# Patient Record
Sex: MALE | Race: OTHER | HISPANIC OR LATINO | URBAN - METROPOLITAN AREA
[De-identification: names, ages, dates, MRNs, and addresses within clinical notes are randomized per-mention and may not be internally consistent; named-entity substitution may affect disease eponyms.]

---

## 2017-08-04 ENCOUNTER — EMERGENCY (EMERGENCY)
Facility: HOSPITAL | Age: 57
LOS: 1 days | Discharge: HOME | End: 2017-08-04
Attending: EMERGENCY MEDICINE | Admitting: EMERGENCY MEDICINE
Payer: COMMERCIAL

## 2017-08-04 VITALS
RESPIRATION RATE: 18 BRPM | OXYGEN SATURATION: 94 % | HEART RATE: 96 BPM | DIASTOLIC BLOOD PRESSURE: 92 MMHG | TEMPERATURE: 98 F | SYSTOLIC BLOOD PRESSURE: 147 MMHG

## 2017-08-04 VITALS
RESPIRATION RATE: 18 BRPM | SYSTOLIC BLOOD PRESSURE: 135 MMHG | OXYGEN SATURATION: 94 % | DIASTOLIC BLOOD PRESSURE: 78 MMHG | HEART RATE: 105 BPM | TEMPERATURE: 98 F

## 2017-08-04 LAB
ALBUMIN SERPL ELPH-MCNC: 4 G/DL — SIGNIFICANT CHANGE UP (ref 3.3–5)
ALP SERPL-CCNC: 101 U/L — SIGNIFICANT CHANGE UP (ref 40–120)
ALT FLD-CCNC: 33 U/L — SIGNIFICANT CHANGE UP (ref 4–41)
AST SERPL-CCNC: 24 U/L — SIGNIFICANT CHANGE UP (ref 4–40)
BASOPHILS # BLD AUTO: 0.04 K/UL — SIGNIFICANT CHANGE UP (ref 0–0.2)
BASOPHILS NFR BLD AUTO: 0.7 % — SIGNIFICANT CHANGE UP (ref 0–2)
BILIRUB SERPL-MCNC: 0.4 MG/DL — SIGNIFICANT CHANGE UP (ref 0.2–1.2)
BUN SERPL-MCNC: 17 MG/DL — SIGNIFICANT CHANGE UP (ref 7–23)
CALCIUM SERPL-MCNC: 8.7 MG/DL — SIGNIFICANT CHANGE UP (ref 8.4–10.5)
CHLORIDE SERPL-SCNC: 102 MMOL/L — SIGNIFICANT CHANGE UP (ref 98–107)
CO2 SERPL-SCNC: 27 MMOL/L — SIGNIFICANT CHANGE UP (ref 22–31)
CREAT SERPL-MCNC: 0.95 MG/DL — SIGNIFICANT CHANGE UP (ref 0.5–1.3)
EOSINOPHIL # BLD AUTO: 0.6 K/UL — HIGH (ref 0–0.5)
EOSINOPHIL NFR BLD AUTO: 10.4 % — HIGH (ref 0–6)
GLUCOSE SERPL-MCNC: 125 MG/DL — HIGH (ref 70–99)
HCT VFR BLD CALC: 47 % — SIGNIFICANT CHANGE UP (ref 39–50)
HGB BLD-MCNC: 15.1 G/DL — SIGNIFICANT CHANGE UP (ref 13–17)
IMM GRANULOCYTES # BLD AUTO: 0.02 # — SIGNIFICANT CHANGE UP
IMM GRANULOCYTES NFR BLD AUTO: 0.3 % — SIGNIFICANT CHANGE UP (ref 0–1.5)
LYMPHOCYTES # BLD AUTO: 1.41 K/UL — SIGNIFICANT CHANGE UP (ref 1–3.3)
LYMPHOCYTES # BLD AUTO: 24.4 % — SIGNIFICANT CHANGE UP (ref 13–44)
MCHC RBC-ENTMCNC: 28.2 PG — SIGNIFICANT CHANGE UP (ref 27–34)
MCHC RBC-ENTMCNC: 32.1 % — SIGNIFICANT CHANGE UP (ref 32–36)
MCV RBC AUTO: 87.9 FL — SIGNIFICANT CHANGE UP (ref 80–100)
MONOCYTES # BLD AUTO: 0.48 K/UL — SIGNIFICANT CHANGE UP (ref 0–0.9)
MONOCYTES NFR BLD AUTO: 8.3 % — SIGNIFICANT CHANGE UP (ref 2–14)
NEUTROPHILS # BLD AUTO: 3.23 K/UL — SIGNIFICANT CHANGE UP (ref 1.8–7.4)
NEUTROPHILS NFR BLD AUTO: 55.9 % — SIGNIFICANT CHANGE UP (ref 43–77)
NRBC # FLD: 0 — SIGNIFICANT CHANGE UP
PLATELET # BLD AUTO: 250 K/UL — SIGNIFICANT CHANGE UP (ref 150–400)
PMV BLD: 10.5 FL — SIGNIFICANT CHANGE UP (ref 7–13)
POTASSIUM SERPL-MCNC: 4.8 MMOL/L — SIGNIFICANT CHANGE UP (ref 3.5–5.3)
POTASSIUM SERPL-SCNC: 4.8 MMOL/L — SIGNIFICANT CHANGE UP (ref 3.5–5.3)
PROT SERPL-MCNC: 7.6 G/DL — SIGNIFICANT CHANGE UP (ref 6–8.3)
RBC # BLD: 5.35 M/UL — SIGNIFICANT CHANGE UP (ref 4.2–5.8)
RBC # FLD: 13.9 % — SIGNIFICANT CHANGE UP (ref 10.3–14.5)
SODIUM SERPL-SCNC: 142 MMOL/L — SIGNIFICANT CHANGE UP (ref 135–145)
WBC # BLD: 5.78 K/UL — SIGNIFICANT CHANGE UP (ref 3.8–10.5)
WBC # FLD AUTO: 5.78 K/UL — SIGNIFICANT CHANGE UP (ref 3.8–10.5)

## 2017-08-04 PROCEDURE — 99285 EMERGENCY DEPT VISIT HI MDM: CPT

## 2017-08-04 PROCEDURE — 71020: CPT | Mod: 26

## 2017-08-04 RX ORDER — IPRATROPIUM/ALBUTEROL SULFATE 18-103MCG
AEROSOL WITH ADAPTER (GRAM) INHALATION
Qty: 0 | Refills: 0 | Status: COMPLETED | OUTPATIENT
Start: 2017-08-04 | End: 2017-08-04

## 2017-08-04 RX ORDER — ALBUTEROL 90 UG/1
1 AEROSOL, METERED ORAL ONCE
Qty: 0 | Refills: 0 | Status: COMPLETED | OUTPATIENT
Start: 2017-08-04 | End: 2017-08-04

## 2017-08-04 RX ORDER — SODIUM CHLORIDE 9 MG/ML
1000 INJECTION INTRAMUSCULAR; INTRAVENOUS; SUBCUTANEOUS ONCE
Qty: 0 | Refills: 0 | Status: COMPLETED | OUTPATIENT
Start: 2017-08-04 | End: 2017-08-04

## 2017-08-04 RX ORDER — MAGNESIUM SULFATE 500 MG/ML
2 VIAL (ML) INJECTION ONCE
Qty: 0 | Refills: 0 | Status: COMPLETED | OUTPATIENT
Start: 2017-08-04 | End: 2017-08-04

## 2017-08-04 RX ORDER — IPRATROPIUM/ALBUTEROL SULFATE 18-103MCG
3 AEROSOL WITH ADAPTER (GRAM) INHALATION ONCE
Qty: 0 | Refills: 0 | Status: COMPLETED | OUTPATIENT
Start: 2017-08-04 | End: 2017-08-04

## 2017-08-04 RX ADMIN — SODIUM CHLORIDE 1000 MILLILITER(S): 9 INJECTION INTRAMUSCULAR; INTRAVENOUS; SUBCUTANEOUS at 11:36

## 2017-08-04 RX ADMIN — Medication 3 MILLILITER(S): at 11:00

## 2017-08-04 RX ADMIN — Medication 50 GRAM(S): at 11:00

## 2017-08-04 RX ADMIN — Medication 3 MILLILITER(S): at 11:36

## 2017-08-04 RX ADMIN — ALBUTEROL 1 PUFF(S): 90 AEROSOL, METERED ORAL at 14:11

## 2017-08-04 RX ADMIN — Medication 125 MILLIGRAM(S): at 11:00

## 2017-08-04 NOTE — ED PROVIDER NOTE - OBJECTIVE STATEMENT
58 y/o M PMH asthma, hypothyroidism c/o wheezing and SOB x 2 days. Pt has taken nebulizer tx twice yesterday and once this am without relief. States he frequently gets exacerbations in the summer d/t heat. Denies fever, chills, CP/tightness, cough, abdominal pain, N/V, sore throat/rhinorrhea, h/o intubations/hospitalizations d/t asthma, le edema, recent travel/sx, h/o DVT/PE, smoking hx. 56 y/o M PMH asthma, hypothyroidism c/o wheezing and SOB x 2 days. Pt has taken nebulizer tx twice yesterday and once this am without relief. States he frequently gets exacerbations in the summer d/t heat. Denies fever, chills, CP/tightness, cough, abdominal pain, N/V, sore throat/rhinorrhea, h/o intubations/hospitalizations d/t asthma, le edema, recent travel/sx, h/o DVT/PE, smoking hx.  ATTG NOTE DR. MAYEN No previous hospitalization for asthma, typically triggered by change in weather.

## 2017-08-04 NOTE — ED PROVIDER NOTE - PLAN OF CARE
Follow up with your PMD within 48-72 hours.  Rest, increase fluids. Take Prednisone 40mg daily for 4 days, Albuterol inhaler 2 inhalations every 4-6 hours as needed. Take all of your other medications as previously prescribed. Any worsening wheezing, shortness of breath, chest pain, fever, chills return to the ED

## 2017-08-04 NOTE — ED ADULT TRIAGE NOTE - CHIEF COMPLAINT QUOTE
Pt having difficulty breathing since this morning, placed on 3L NC, breathing even and mildly labored, wheezing on auscultation. Pt has hx of asthma, took albuterol nebulizer at home.

## 2017-08-04 NOTE — ED PROVIDER NOTE - PHYSICAL EXAMINATION
Pt speaking in complete sentences, O2 sat 94% on 4L NC Pt speaking in complete sentences, O2 sat 94% on 4L NC ATTENDING PHYSICAL EXAM DR. MAYEN ***GEN - NAD; well appearing; A+O x3 ***HEAD - NC/AT ***EYES/NOSE - PERRL, EOMI, mucous membranes moist, no discharge ***THROAT: Oral cavity and pharynx normal. No inflammation, swelling, exudate, or lesions.  ***NECK: Neck supple, non-tender without lymphadenopathy, no masses, no thyromegaly.   ***PULMONARY - b/l wheezing, good air entry, no accessory muscle use ***CARDIAC -s1s2, RRR, no M,G,R ***ABDOMEN - +BS, ND, NT, soft, no guarding, no rebound, no masses  ***BACK - no CVA tenderness, Normal  spine ***EXTREMITIES - symmetric pulses, 2+ dp, capillary refill < 2 seconds, no clubbing, no cyanosis, no edema ***SKIN - no rash or bruising  ***NEUROLOGIC - alert

## 2017-08-04 NOTE — ED PROVIDER NOTE - PROGRESS NOTE DETAILS
Pt states wheezing significantly improved, feels back to baseline. CDU offered as pt's O2 sat remains 94% (now on RA) but pt states his O2 sat is always 94% and he would declined CDU stay. Will send prednisone to rx and recommend pulm f/u - referral list given. Pt otherwise stable for dc.

## 2017-08-04 NOTE — ED PROVIDER NOTE - CARE PLAN
Principal Discharge DX:	Asthma  Instructions for follow-up, activity and diet:	Follow up with your PMD within 48-72 hours.  Rest, increase fluids. Take Prednisone 40mg daily for 4 days, Albuterol inhaler 2 inhalations every 4-6 hours as needed. Take all of your other medications as previously prescribed. Any worsening wheezing, shortness of breath, chest pain, fever, chills return to the ED

## 2017-08-04 NOTE — ED ADULT NURSE NOTE - OBJECTIVE STATEMENT
Patient presented to ED with c/o asthma exacerbation, patient evaluated by provider, medication orderd and administered.  Will continue to monitor patient closely.  Flora GORDON

## 2017-08-04 NOTE — ED PROVIDER NOTE - MEDICAL DECISION MAKING DETAILS
56 y/o M with likely asthma exacerbation, decreased O2 sat and mild respiratory distress in ED;  likely CDU for continued tx of exacerbation  -IVF, steroids, duonebs, mag sulfate, cxr, ekg, peak flow, cbc, cmp

## 2017-08-17 ENCOUNTER — OTHER (OUTPATIENT)
Age: 57
End: 2017-08-17

## 2017-09-03 ENCOUNTER — INPATIENT (INPATIENT)
Facility: HOSPITAL | Age: 57
LOS: 2 days | Discharge: HOME | End: 2017-09-06
Attending: INTERNAL MEDICINE | Admitting: INTERNAL MEDICINE
Payer: COMMERCIAL

## 2017-09-03 VITALS
TEMPERATURE: 98 F | HEART RATE: 107 BPM | OXYGEN SATURATION: 93 % | RESPIRATION RATE: 16 BRPM | SYSTOLIC BLOOD PRESSURE: 120 MMHG | DIASTOLIC BLOOD PRESSURE: 70 MMHG

## 2017-09-03 DIAGNOSIS — E03.9 HYPOTHYROIDISM, UNSPECIFIED: ICD-10-CM

## 2017-09-03 DIAGNOSIS — I48.91 UNSPECIFIED ATRIAL FIBRILLATION: ICD-10-CM

## 2017-09-03 DIAGNOSIS — Z98.890 OTHER SPECIFIED POSTPROCEDURAL STATES: Chronic | ICD-10-CM

## 2017-09-03 DIAGNOSIS — J45.901 UNSPECIFIED ASTHMA WITH (ACUTE) EXACERBATION: ICD-10-CM

## 2017-09-03 DIAGNOSIS — Z29.9 ENCOUNTER FOR PROPHYLACTIC MEASURES, UNSPECIFIED: ICD-10-CM

## 2017-09-03 LAB
ALBUMIN SERPL ELPH-MCNC: 3.8 G/DL — SIGNIFICANT CHANGE UP (ref 3.3–5)
ALP SERPL-CCNC: 91 U/L — SIGNIFICANT CHANGE UP (ref 40–120)
ALT FLD-CCNC: 17 U/L — SIGNIFICANT CHANGE UP (ref 4–41)
APTT BLD: 32.6 SEC — SIGNIFICANT CHANGE UP (ref 27.5–37.4)
AST SERPL-CCNC: 38 U/L — SIGNIFICANT CHANGE UP (ref 4–40)
BASE EXCESS BLDV CALC-SCNC: 0.5 MMOL/L — SIGNIFICANT CHANGE UP
BASOPHILS # BLD AUTO: 0.05 K/UL — SIGNIFICANT CHANGE UP (ref 0–0.2)
BASOPHILS NFR BLD AUTO: 0.7 % — SIGNIFICANT CHANGE UP (ref 0–2)
BILIRUB SERPL-MCNC: 0.4 MG/DL — SIGNIFICANT CHANGE UP (ref 0.2–1.2)
BLOOD GAS VENOUS - CREATININE: 0.86 MG/DL — SIGNIFICANT CHANGE UP (ref 0.5–1.3)
BUN SERPL-MCNC: 18 MG/DL — SIGNIFICANT CHANGE UP (ref 7–23)
CALCIUM SERPL-MCNC: 8.6 MG/DL — SIGNIFICANT CHANGE UP (ref 8.4–10.5)
CHLORIDE BLDV-SCNC: 104 MMOL/L — SIGNIFICANT CHANGE UP (ref 96–108)
CHLORIDE SERPL-SCNC: 101 MMOL/L — SIGNIFICANT CHANGE UP (ref 98–107)
CK MB BLD-MCNC: 1.9 — SIGNIFICANT CHANGE UP (ref 0–2.5)
CK MB BLD-MCNC: 3.85 NG/ML — SIGNIFICANT CHANGE UP (ref 1–6.6)
CK SERPL-CCNC: 206 U/L — HIGH (ref 30–200)
CO2 SERPL-SCNC: 21 MMOL/L — LOW (ref 22–31)
CREAT SERPL-MCNC: 1 MG/DL — SIGNIFICANT CHANGE UP (ref 0.5–1.3)
D DIMER BLD IA.RAPID-MCNC: < 150 NG/ML — SIGNIFICANT CHANGE UP
EOSINOPHIL # BLD AUTO: 0.65 K/UL — HIGH (ref 0–0.5)
EOSINOPHIL NFR BLD AUTO: 9.3 % — HIGH (ref 0–6)
GAS PNL BLDV: 135 MMOL/L — LOW (ref 136–146)
GLUCOSE BLDV-MCNC: 131 — HIGH (ref 70–99)
GLUCOSE SERPL-MCNC: 124 MG/DL — HIGH (ref 70–99)
HCO3 BLDV-SCNC: 25 MMOL/L — SIGNIFICANT CHANGE UP (ref 20–27)
HCT VFR BLD CALC: 49.1 % — SIGNIFICANT CHANGE UP (ref 39–50)
HCT VFR BLDV CALC: 49.6 % — SIGNIFICANT CHANGE UP (ref 39–51)
HGB BLD-MCNC: 15.7 G/DL — SIGNIFICANT CHANGE UP (ref 13–17)
HGB BLDV-MCNC: 16.2 G/DL — SIGNIFICANT CHANGE UP (ref 13–17)
IMM GRANULOCYTES # BLD AUTO: 0.04 # — SIGNIFICANT CHANGE UP
IMM GRANULOCYTES NFR BLD AUTO: 0.6 % — SIGNIFICANT CHANGE UP (ref 0–1.5)
INR BLD: 1.02 — SIGNIFICANT CHANGE UP (ref 0.88–1.17)
LACTATE BLDV-MCNC: 1.8 MMOL/L — SIGNIFICANT CHANGE UP (ref 0.5–2)
LYMPHOCYTES # BLD AUTO: 1.77 K/UL — SIGNIFICANT CHANGE UP (ref 1–3.3)
LYMPHOCYTES # BLD AUTO: 25.3 % — SIGNIFICANT CHANGE UP (ref 13–44)
MCHC RBC-ENTMCNC: 28 PG — SIGNIFICANT CHANGE UP (ref 27–34)
MCHC RBC-ENTMCNC: 32 % — SIGNIFICANT CHANGE UP (ref 32–36)
MCV RBC AUTO: 87.7 FL — SIGNIFICANT CHANGE UP (ref 80–100)
MONOCYTES # BLD AUTO: 0.59 K/UL — SIGNIFICANT CHANGE UP (ref 0–0.9)
MONOCYTES NFR BLD AUTO: 8.4 % — SIGNIFICANT CHANGE UP (ref 2–14)
NEUTROPHILS # BLD AUTO: 3.89 K/UL — SIGNIFICANT CHANGE UP (ref 1.8–7.4)
NEUTROPHILS NFR BLD AUTO: 55.7 % — SIGNIFICANT CHANGE UP (ref 43–77)
NRBC # FLD: 0 — SIGNIFICANT CHANGE UP
PCO2 BLDV: 41 MMHG — SIGNIFICANT CHANGE UP (ref 41–51)
PH BLDV: 7.4 PH — SIGNIFICANT CHANGE UP (ref 7.32–7.43)
PLATELET # BLD AUTO: 303 K/UL — SIGNIFICANT CHANGE UP (ref 150–400)
PMV BLD: 10.3 FL — SIGNIFICANT CHANGE UP (ref 7–13)
PO2 BLDV: 85 MMHG — HIGH (ref 35–40)
POTASSIUM BLDV-SCNC: 4.5 MMOL/L — SIGNIFICANT CHANGE UP (ref 3.4–4.5)
POTASSIUM SERPL-MCNC: 5.4 MMOL/L — HIGH (ref 3.5–5.3)
POTASSIUM SERPL-SCNC: 5.4 MMOL/L — HIGH (ref 3.5–5.3)
PROT SERPL-MCNC: 7.3 G/DL — SIGNIFICANT CHANGE UP (ref 6–8.3)
PROTHROM AB SERPL-ACNC: 11.4 SEC — SIGNIFICANT CHANGE UP (ref 9.8–13.1)
RBC # BLD: 5.6 M/UL — SIGNIFICANT CHANGE UP (ref 4.2–5.8)
RBC # FLD: 14.2 % — SIGNIFICANT CHANGE UP (ref 10.3–14.5)
SAO2 % BLDV: 97.1 % — HIGH (ref 60–85)
SODIUM SERPL-SCNC: 135 MMOL/L — SIGNIFICANT CHANGE UP (ref 135–145)
TROPONIN T SERPL-MCNC: < 0.06 NG/ML — SIGNIFICANT CHANGE UP (ref 0–0.06)
WBC # BLD: 6.99 K/UL — SIGNIFICANT CHANGE UP (ref 3.8–10.5)
WBC # FLD AUTO: 6.99 K/UL — SIGNIFICANT CHANGE UP (ref 3.8–10.5)

## 2017-09-03 PROCEDURE — 71020: CPT | Mod: 26

## 2017-09-03 PROCEDURE — 71275 CT ANGIOGRAPHY CHEST: CPT | Mod: 26

## 2017-09-03 RX ORDER — ASPIRIN/CALCIUM CARB/MAGNESIUM 324 MG
162 TABLET ORAL ONCE
Qty: 0 | Refills: 0 | Status: COMPLETED | OUTPATIENT
Start: 2017-09-03 | End: 2017-09-03

## 2017-09-03 RX ORDER — SODIUM CHLORIDE 9 MG/ML
1000 INJECTION INTRAMUSCULAR; INTRAVENOUS; SUBCUTANEOUS ONCE
Qty: 0 | Refills: 0 | Status: COMPLETED | OUTPATIENT
Start: 2017-09-03 | End: 2017-09-03

## 2017-09-03 RX ORDER — IPRATROPIUM/ALBUTEROL SULFATE 18-103MCG
3 AEROSOL WITH ADAPTER (GRAM) INHALATION ONCE
Qty: 0 | Refills: 0 | Status: COMPLETED | OUTPATIENT
Start: 2017-09-03 | End: 2017-09-03

## 2017-09-03 RX ORDER — MAGNESIUM SULFATE 500 MG/ML
2 VIAL (ML) INJECTION ONCE
Qty: 0 | Refills: 0 | Status: COMPLETED | OUTPATIENT
Start: 2017-09-03 | End: 2017-09-03

## 2017-09-03 RX ORDER — SODIUM CHLORIDE 9 MG/ML
3 INJECTION INTRAMUSCULAR; INTRAVENOUS; SUBCUTANEOUS EVERY 8 HOURS
Qty: 0 | Refills: 0 | Status: DISCONTINUED | OUTPATIENT
Start: 2017-09-03 | End: 2017-09-06

## 2017-09-03 RX ORDER — LEVALBUTEROL 1.25 MG/.5ML
0.63 SOLUTION, CONCENTRATE RESPIRATORY (INHALATION) EVERY 6 HOURS
Qty: 0 | Refills: 0 | Status: DISCONTINUED | OUTPATIENT
Start: 2017-09-03 | End: 2017-09-06

## 2017-09-03 RX ORDER — ENOXAPARIN SODIUM 100 MG/ML
150 INJECTION SUBCUTANEOUS ONCE
Qty: 0 | Refills: 0 | Status: COMPLETED | OUTPATIENT
Start: 2017-09-03 | End: 2017-09-03

## 2017-09-03 RX ORDER — MOMETASONE FUROATE 220 UG/1
1 INHALANT RESPIRATORY (INHALATION)
Qty: 0 | Refills: 0 | Status: DISCONTINUED | OUTPATIENT
Start: 2017-09-03 | End: 2017-09-06

## 2017-09-03 RX ORDER — ASPIRIN/CALCIUM CARB/MAGNESIUM 324 MG
81 TABLET ORAL DAILY
Qty: 0 | Refills: 0 | Status: DISCONTINUED | OUTPATIENT
Start: 2017-09-03 | End: 2017-09-04

## 2017-09-03 RX ORDER — ENOXAPARIN SODIUM 100 MG/ML
40 INJECTION SUBCUTANEOUS EVERY 24 HOURS
Qty: 0 | Refills: 0 | Status: DISCONTINUED | OUTPATIENT
Start: 2017-09-04 | End: 2017-09-04

## 2017-09-03 RX ORDER — ALBUTEROL 90 UG/1
2.5 AEROSOL, METERED ORAL ONCE
Qty: 0 | Refills: 0 | Status: COMPLETED | OUTPATIENT
Start: 2017-09-03 | End: 2017-09-03

## 2017-09-03 RX ADMIN — SODIUM CHLORIDE 1000 MILLILITER(S): 9 INJECTION INTRAMUSCULAR; INTRAVENOUS; SUBCUTANEOUS at 18:08

## 2017-09-03 RX ADMIN — Medication 3 MILLILITER(S): at 17:44

## 2017-09-03 RX ADMIN — ALBUTEROL 2.5 MILLIGRAM(S): 90 AEROSOL, METERED ORAL at 18:52

## 2017-09-03 RX ADMIN — Medication 3 MILLILITER(S): at 18:02

## 2017-09-03 RX ADMIN — Medication 50 GRAM(S): at 18:41

## 2017-09-03 RX ADMIN — Medication 162 MILLIGRAM(S): at 22:14

## 2017-09-03 RX ADMIN — Medication 60 MILLIGRAM(S): at 17:43

## 2017-09-03 RX ADMIN — SODIUM CHLORIDE 1000 MILLILITER(S): 9 INJECTION INTRAMUSCULAR; INTRAVENOUS; SUBCUTANEOUS at 17:44

## 2017-09-03 NOTE — H&P ADULT - NEGATIVE NEUROLOGICAL SYMPTOMS
no weakness/no paresthesias/no focal seizures/no generalized seizures/no syncope/no transient paralysis

## 2017-09-03 NOTE — ED ADULT NURSE REASSESSMENT NOTE - NS ED NURSE REASSESS COMMENT FT1
peak flow 400 after 3 duonebs and 1 albuterol, spo2 on room air 96%, pt appears more comfortable.  still reports some mild tightness but otherwise feeling much better. 20g iv placed in right ac, additional lab work sent, pt on telemetry- a-fib b/t 110-30 noted.  Will continue to monitor.

## 2017-09-03 NOTE — ED PROVIDER NOTE - PROGRESS NOTE DETAILS
COLE Vera: Peak flow 200, feeling better w/ duoneb. COLE Vera: EKG done showing afib w/ RVR. Pt reassessed, denies hx of afib, no chest pain. Pt moved to Trauma A. Signed out to Dr. Ragsdale and Dr. Randolph

## 2017-09-03 NOTE — H&P ADULT - NEUROLOGICAL DETAILS
alert and oriented x 3/responds to verbal commands/normal strength/no spontaneous movement/sensation intact

## 2017-09-03 NOTE — H&P ADULT - NSHPLABSRESULTS_GEN_ALL_CORE
CXr preliminary = clear  CTPA=Suboptimal examination of the pulmonary arteries. No central pulmonary  embolism.  CE negative x 1  D Dimer< 150  K+= 5.4 hemolysis   BUN/ creatinine= 18/ 1.0  EKG A FIB

## 2017-09-03 NOTE — ED ADULT TRIAGE NOTE - CHIEF COMPLAINT QUOTE
Pt with hx asthma c/o cough with yellow phlegm and wheezing since Aug 1.  Denies chills, headache, body ache, N/V/D.  Pt seen by PCP 1 week ago prescribed prednisone.   Pt took albuterol  this am with no relief.  Pt is scheduled to see a pulmonologist on 9/7

## 2017-09-03 NOTE — ED ADULT NURSE NOTE - OBJECTIVE STATEMENT
Pt awake and alert pt co sob 02 sat 93% room air, pt given duo nebs and steroids as ordered.  Pt with wheezing awaiting chest xray and dispo.

## 2017-09-03 NOTE — ED PROVIDER NOTE - MEDICAL DECISION MAKING DETAILS
58 y/o m w/ worsening SOB x 1 day, diffuse wheezing on exam w/ low peak flow despite good effort. Intially given duonebs and prednisone with some relief. EKG then done showing afib w/ rvr, pt moved over to critical care for further management. Plan CTA chest to eval for PE, rate control to be done by critical team.

## 2017-09-03 NOTE — H&P ADULT - NEGATIVE ENMT SYMPTOMS
no nose bleeds/no ear pain/no dry mouth/no nasal congestion/no gum bleeding/no vertigo/no nasal discharge/no tinnitus/no sinus symptoms

## 2017-09-03 NOTE — ED PROVIDER NOTE - OBJECTIVE STATEMENT
56 y/o M PMHx asthma (hx hospitalizations, no intubations, on ventolin prn) and hypothyroidism presents c/o shortness of breath worsening today. Was seen at the [facility] ER 1 month ago, discharged on ventolin and Prednisone 40mg, saw his PMD last week who prescribed another course of Prednisone 20mg. Pt has an appt to see a pulmonologist this week. 56 y/o M PMHx asthma (hx hospitalizations, no intubations, on ventolin prn) and hypothyroidism presents c/o shortness of breath worsening today. Was seen at the [facility] ER 1 month ago, discharged on ventolin and Prednisone 40mg, saw his PMD last week who prescribed another course of Prednisone 20mg. Pt has an appt to see a pulmonologist this week. Came in today because he became short of breath while at rest, which is new from his usual asthma. Admits to a cough productive of white sputum. Denies fevers, chills, chest pain, lower ext edema, recent surgery, recent travel or any other complaints. Used his ventolin inhaler more then 4x today.

## 2017-09-03 NOTE — ED PROVIDER NOTE - ATTENDING CONTRIBUTION TO CARE
57M p/w sob and wheezing worsening today, worsening - pt having SOB at rest which is new/worse.  Reports also cough prod of whitish sputum.  No fever.  No leg swelling or pain.  No h/o DVT/PE.  Recent treatment with steroid course x 2 over the past month.  VS:  tachycardia, mild hypoxia, tachypnea    GEN - mild resp distress; A+O x3 Obese  HEAD - NC/AT     ENT - PEERL, EOMI, mucous membranes  moist , no discharge      NECK: Neck supple, non-tender without lymphadenopathy, no masses, no JVD  PULM - bilateral wheezing,  symmetric breath sounds  COR -  irreg irreg heart sounds    ABD - , ND, NT, soft, no guarding, no rebound, no masses    BACK - no CVA tenderness, nontender spine     EXTREMS - no edema, no deformity, warm and well perfused    SKIN - no rash or bruising      NEUROLOGIC - alert, CN 2-12 intact, sensation nl, motor 5/5 RUE/LUE/RLE/LLE.      IMP:  57M p/w SOB and wheezing today, 3rd exacerbation in 1 month, a/w new onset afib with RVR.  Potentially caused by albuterol however possibility of PE exists given mild hypoxia and tachypnea - mod risk per RGS.  Will CTA and continue treatments.  Likely will need to stay in the hospital for cards eval as well as further asthma mgmt.

## 2017-09-03 NOTE — H&P ADULT - HISTORY OF PRESENT ILLNESS
57M with a history of asthma with multiple hospitalizations, no intubations, on ventolin prn and hypothyroidism, experiencing worsening shortness of breath while at rest, which is new from his usual asthma. Admits to a cough productive of white sputum. Denies fevers, chills, chest pain, lower ext edema, recent surgery, recent travel or any other complaints. Used his ventolin inhaler more then 4x today.    In the Ed, the pt received DUO NEBS---> new onset JOSH  Currently his SOB has improved.

## 2017-09-04 LAB
BUN SERPL-MCNC: 17 MG/DL — SIGNIFICANT CHANGE UP (ref 7–23)
CALCIUM SERPL-MCNC: 9 MG/DL — SIGNIFICANT CHANGE UP (ref 8.4–10.5)
CHLORIDE SERPL-SCNC: 101 MMOL/L — SIGNIFICANT CHANGE UP (ref 98–107)
CHOLEST SERPL-MCNC: 230 MG/DL — HIGH (ref 120–199)
CK SERPL-CCNC: 202 U/L — HIGH (ref 30–200)
CO2 SERPL-SCNC: 16 MMOL/L — LOW (ref 22–31)
CREAT SERPL-MCNC: 0.91 MG/DL — SIGNIFICANT CHANGE UP (ref 0.5–1.3)
GLUCOSE SERPL-MCNC: 140 MG/DL — HIGH (ref 70–99)
HBA1C BLD-MCNC: 6.1 % — HIGH (ref 4–5.6)
HCT VFR BLD CALC: 49.5 % — SIGNIFICANT CHANGE UP (ref 39–50)
HDLC SERPL-MCNC: 43 MG/DL — SIGNIFICANT CHANGE UP (ref 35–55)
HGB BLD-MCNC: 15.5 G/DL — SIGNIFICANT CHANGE UP (ref 13–17)
LIPID PNL WITH DIRECT LDL SERPL: 171 MG/DL — SIGNIFICANT CHANGE UP
MAGNESIUM SERPL-MCNC: 2.4 MG/DL — SIGNIFICANT CHANGE UP (ref 1.6–2.6)
MCHC RBC-ENTMCNC: 27.9 PG — SIGNIFICANT CHANGE UP (ref 27–34)
MCHC RBC-ENTMCNC: 31.3 % — LOW (ref 32–36)
MCV RBC AUTO: 89 FL — SIGNIFICANT CHANGE UP (ref 80–100)
NRBC # FLD: 0 — SIGNIFICANT CHANGE UP
PHOSPHATE SERPL-MCNC: 3.7 MG/DL — SIGNIFICANT CHANGE UP (ref 2.5–4.5)
PLATELET # BLD AUTO: 306 K/UL — SIGNIFICANT CHANGE UP (ref 150–400)
PMV BLD: 10.5 FL — SIGNIFICANT CHANGE UP (ref 7–13)
POTASSIUM SERPL-MCNC: 5.4 MMOL/L — HIGH (ref 3.5–5.3)
POTASSIUM SERPL-SCNC: 5.4 MMOL/L — HIGH (ref 3.5–5.3)
RBC # BLD: 5.56 M/UL — SIGNIFICANT CHANGE UP (ref 4.2–5.8)
RBC # FLD: 14.1 % — SIGNIFICANT CHANGE UP (ref 10.3–14.5)
SODIUM SERPL-SCNC: 137 MMOL/L — SIGNIFICANT CHANGE UP (ref 135–145)
TRIGL SERPL-MCNC: 118 MG/DL — SIGNIFICANT CHANGE UP (ref 10–149)
TROPONIN T SERPL-MCNC: < 0.06 NG/ML — SIGNIFICANT CHANGE UP (ref 0–0.06)
WBC # BLD: 8.51 K/UL — SIGNIFICANT CHANGE UP (ref 3.8–10.5)
WBC # FLD AUTO: 8.51 K/UL — SIGNIFICANT CHANGE UP (ref 3.8–10.5)

## 2017-09-04 RX ORDER — THYROID 120 MG
60 TABLET ORAL DAILY
Qty: 0 | Refills: 0 | Status: DISCONTINUED | OUTPATIENT
Start: 2017-09-04 | End: 2017-09-06

## 2017-09-04 RX ORDER — ENOXAPARIN SODIUM 100 MG/ML
150 INJECTION SUBCUTANEOUS EVERY 12 HOURS
Qty: 0 | Refills: 0 | Status: DISCONTINUED | OUTPATIENT
Start: 2017-09-04 | End: 2017-09-05

## 2017-09-04 RX ADMIN — SODIUM CHLORIDE 3 MILLILITER(S): 9 INJECTION INTRAMUSCULAR; INTRAVENOUS; SUBCUTANEOUS at 14:15

## 2017-09-04 RX ADMIN — ENOXAPARIN SODIUM 150 MILLIGRAM(S): 100 INJECTION SUBCUTANEOUS at 00:37

## 2017-09-04 RX ADMIN — LEVALBUTEROL 0.63 MILLIGRAM(S): 1.25 SOLUTION, CONCENTRATE RESPIRATORY (INHALATION) at 18:32

## 2017-09-04 RX ADMIN — SODIUM CHLORIDE 3 MILLILITER(S): 9 INJECTION INTRAMUSCULAR; INTRAVENOUS; SUBCUTANEOUS at 21:43

## 2017-09-04 RX ADMIN — SODIUM CHLORIDE 3 MILLILITER(S): 9 INJECTION INTRAMUSCULAR; INTRAVENOUS; SUBCUTANEOUS at 06:18

## 2017-09-04 RX ADMIN — MOMETASONE FUROATE 1 PUFF(S): 220 INHALANT RESPIRATORY (INHALATION) at 21:44

## 2017-09-04 RX ADMIN — ENOXAPARIN SODIUM 150 MILLIGRAM(S): 100 INJECTION SUBCUTANEOUS at 18:43

## 2017-09-04 NOTE — PROGRESS NOTE ADULT - ASSESSMENT
57M admitted for Asthma exacerbation and new onset JOSH     Problem/Plan - 1:  ·  Problem: Asthma exacerbation.  Plan: Xopenex treatment Q6* PRN  daily peak flows.     Problem/Plan - 2:  ·  Problem: Atrial fibrillation, unspecified type.CHADS2-1  Plan: CM  F/ U CE ,EKG, TTE, TSH  Give Lovenox 150 mg sub cut x1  Rate control with low dose CCB.EP evaluation-Dr Blas     Problem/Plan - 3:  ·  Problem: Hypothyroidism.  Plan: Not on any medications  F/U TSH.     Problem/Plan - 4:  ·  Problem: Need for prophylactic measure.  Plan: Given Lovenox 150 mg sub cut x1  Then in 12 hours Lovenox 40 mg sub cut daily.

## 2017-09-04 NOTE — PROGRESS NOTE ADULT - ASSESSMENT
Patient is a 57y old  Male who presents with a chief complaint of SOB.     Br Asthma exa:    Xopenex PRN  Daily peak flows  Asmanex    A Fib:  Tele  Cardio f/up  TTE  EP eval  Cardizam.  SQ lovenox (therapeutic)

## 2017-09-04 NOTE — PROGRESS NOTE ADULT - SUBJECTIVE AND OBJECTIVE BOX
HPI:HPI:  57M with a history of asthma with multiple hospitalizations, no intubations, on ventolin prn and hypothyroidism, experiencing worsening shortness of breath while at rest, which is new from his usual asthma. Admits to a cough productive of white sputum. Denies fevers, chills, chest pain, lower ext edema, recent surgery, recent travel or any other complaints. Used his ventolin inhaler more then 4x today.    In the Ed, the pt received DUO NEBS---> new onset JOSH  Currently his SOB has improved. No palpitations-remains in Atrial Fibrillation    PAST MEDICAL & SURGICAL HISTORY:  Hypothyroidism  Asthma  H/O knee surgery      MEDICATIONS  (STANDING):  mometasone 220 MICROgram(s) Inhaler 1 Puff(s) Inhalation two times a day  aspirin enteric coated 81 milliGRAM(s) Oral daily  enoxaparin Injectable 40 milliGRAM(s) SubCutaneous every 24 hours  sodium chloride 0.9% lock flush 3 milliLiter(s) IV Push every 8 hours  diltiazem    Tablet 30 milliGRAM(s) Oral four times a day    MEDICATIONS  (PRN):  levalbuterol Inhalation 0.63 milliGRAM(s) Inhalation every 6 hours PRN shortness of breath, wheezing      FAMILY HISTORY:  No pertinent family history in first degree relatives    No family history of premature coronary artery disease or sudden cardiac death      REVIEW OF SYSTEMS:  Constitutional: [ ] fever, [ ]weight loss,  [ ]fatigue  Eyes: [ ] visual changes  Respiratory: [x ]shortness of breath;  [x ] cough, [x ]wheezing, [ ]chills, [ ]hemoptysis  Cardiovascular: [ ] chest pain, [ ]palpitations, [x ]dizziness,  [ ]leg swelling  Gastrointestinal: [ ] abdominal pain, [ ]nausea, [ ]vomiting,  [ ]diarrhea   Genitourinary: [ ] dysuria, [ ] hematuria  Neurologic: [ ] headaches [ ] tremors  Skin: [ ] itching, [ ]burning, [ ] rashes  Endocrine: [ ] heat or cold intolerance  Musculoskeletal: [ ] joint pain or swelling; [ ] muscle, back, or extremity pain  Psychiatric: [ ] depression, [ ]anxiety, [ ]mood swings, or [ ]difficulty sleeping  Hematologic: [ ] easy bruising, [ ] bleeding gums       [ x] All others negative	  [ ] Unable to obtain    Vital Signs Last 24 Hrs  T(C): 36.4 (04 Sep 2017 06:13), Max: 36.9 (03 Sep 2017 16:16)  T(F): 97.5 (04 Sep 2017 06:13), Max: 98.5 (03 Sep 2017 16:16)  HR: 89 (04 Sep 2017 06:13) (89 - 116)  BP: 105/59 (04 Sep 2017 06:13) (100/68 - 138/69)  RR: 17 (04 Sep 2017 06:13) (16 - 22)  SpO2: 97% (04 Sep 2017 06:13) (93% - 97%)      PHYSICAL EXAM:  General: No acute distress  HEENT: EOMI, PERRL  Neck: Supple, No JVD  Lungs: Clear to percussion bilaterally; No rales or wheezing  Heart: Iregular irregular rhythm; 2/6 systolic murmurs,No rubs, or gallops  Abdomen: Nontender, bowel sounds present  Extremities: No clubbing, cyanosis, or edema  Nervous system:  Alert & Oriented X3, no focal deficits  Psychiatric: Normal affect  Skin: No rashes or lesions      LABS:  09-03    135  |  101  |  18  ----------------------------<  124<H>  5.4<H>   |  21<L>  |  1.00    Ca    8.6      03 Sep 2017 17:53    TPro  7.3  /  Alb  3.8  /  TBili  0.4  /  DBili  x   /  AST  38  /  ALT  17  /  AlkPhos  91  09-03    Creatinine Trend: 1.00<--                        15.5   8.51  )-----------( 306      ( 04 Sep 2017 05:30 )             49.5     PT/INR - ( 03 Sep 2017 18:00 )   PT: 11.4 SEC;   INR: 1.02     PTT - ( 03 Sep 2017 18:00 )  PTT:32.6 SEC    Lipid Panel: Cholesterol, Serum 230  Direct   HDL Cholesterol, Serum 43  Triglycerides, Serum 118    Cardiac Enzymes: CARDIAC MARKERS ( 04 Sep 2017 00:49 )  x     / < 0.06 ng/mL / 202 u/L / x     / x      CARDIAC MARKERS ( 03 Sep 2017 17:53 )  x     / < 0.06 ng/mL / 206 u/L / 3.85 ng/mL / x            09-04 [id number] 6.1      RADIOLOGY: CT ANGIO CHEST (W)AW IC IMPRESSION: Suboptimal examination of the pulmonary arteries. No central pulmonary  embolism.No lung consolidation.      ECG [my interpretation]:Normal sinus rhythm Possible Left atrial enlargement    TELEMETRY:Sinus rhythm no arrhythmias    ECHO:PENDING

## 2017-09-04 NOTE — PROGRESS NOTE ADULT - SUBJECTIVE AND OBJECTIVE BOX
Patient is a 57y old  Male who presents with a chief complaint of SOB.       HPI:  57M with a history of asthma with multiple hospitalizations, no intubations, on ventolin prn and hypothyroidism, experiencing worsening shortness of breath while at rest, which is new from his usual asthma. Admits to a cough productive of white sputum. Denies fevers, chills, chest pain, lower ext edema, recent surgery, recent travel or any other complaints. Used his ventolin inhaler more then 4x today.    In the Ed, the pt received DUO NEBS---> new onset JOSH  Currently his SOB has improved. (03 Sep 2017 23:26)      PAST MEDICAL & SURGICAL HISTORY:  Hypothyroidism  Asthma  H/O knee surgery      Review of Systems:   CONSTITUTIONAL: No fever, weight loss, or fatigue  EYES: No eye pain, visual disturbances, or discharge  ENMT:  No difficulty hearing, tinnitus, vertigo; No sinus or throat pain  NECK: No pain or stiffness  BREASTS: No pain, masses, or nipple discharge  RESPIRATORY: No cough, wheezing, chills or hemoptysis; No shortness of breath  CARDIOVASCULAR: No chest pain, palpitations, dizziness, or leg swelling  GASTROINTESTINAL: No abdominal or epigastric pain. No nausea, vomiting, or hematemesis; No diarrhea or constipation. No melena or hematochezia.  GENITOURINARY: No dysuria, frequency, hematuria, or incontinence  NEUROLOGICAL: No headaches, memory loss, loss of strength, numbness, or tremors  SKIN: No itching, burning, rashes, or lesions   LYMPH NODES: No enlarged glands  ENDOCRINE: No heat or cold intolerance; No hair loss  MUSCULOSKELETAL: No joint pain or swelling; No muscle, back, or extremity pain  PSYCHIATRIC: No depression, anxiety, mood swings, or difficulty sleeping  HEME/LYMPH: No easy bruising, or bleeding gums  ALLERY AND IMMUNOLOGIC: No hives or eczema    Allergies    No Known Allergies    Intolerances        Social History:     FAMILY HISTORY:  No pertinent family history in first degree relatives      MEDICATIONS  (STANDING):  mometasone 220 MICROgram(s) Inhaler 1 Puff(s) Inhalation two times a day  sodium chloride 0.9% lock flush 3 milliLiter(s) IV Push every 8 hours  diltiazem    Tablet 30 milliGRAM(s) Oral four times a day  enoxaparin Injectable 150 milliGRAM(s) SubCutaneous every 12 hours  thyroid 60 milliGRAM(s) Oral daily    MEDICATIONS  (PRN):  levalbuterol Inhalation 0.63 milliGRAM(s) Inhalation every 6 hours PRN shortness of breath, wheezing      CAPILLARY BLOOD GLUCOSE        I&O's Summary      PHYSICAL EXAM:  GENERAL: NAD, well-developed  HEAD:  Atraumatic, Normocephalic  EYES: EOMI, PERRLA, conjunctiva and sclera clear  NECK: Supple, No JVD  CHEST/LUNG: Clear to auscultation bilaterally; No wheeze  HEART: Regular rate and rhythm; No murmurs, rubs, or gallops  ABDOMEN: Soft, Nontender, Nondistended; Bowel sounds present  EXTREMITIES:  2+ Peripheral Pulses, No clubbing, cyanosis, or edema  PSYCH: AAOx3  NEUROLOGY: non-focal  SKIN: No rashes or lesions    LABS:                        15.5   8.51  )-----------( 306      ( 04 Sep 2017 05:30 )             49.5     09-04    137  |  101  |  17  ----------------------------<  140<H>  5.4<H>   |  16<L>  |  0.91    Ca    9.0      04 Sep 2017 05:30  Phos  3.7     09-04  Mg     2.4     09-04    TPro  7.3  /  Alb  3.8  /  TBili  0.4  /  DBili  x   /  AST  38  /  ALT  17  /  AlkPhos  91  09-03    PT/INR - ( 03 Sep 2017 18:00 )   PT: 11.4 SEC;   INR: 1.02          PTT - ( 03 Sep 2017 18:00 )  PTT:32.6 SEC  CARDIAC MARKERS ( 04 Sep 2017 00:49 )  x     / < 0.06 ng/mL / 202 u/L / x     / x      CARDIAC MARKERS ( 03 Sep 2017 17:53 )  x     / < 0.06 ng/mL / 206 u/L / 3.85 ng/mL / x            CAPILLARY BLOOD GLUCOSE                    RADIOLOGY & ADDITIONAL TESTS:    Imaging Personally Reviewed:    Consultant(s) Notes Reviewed:      Care Discussed with Consultants/Other Providers:    Thanks for consult. Will follow.

## 2017-09-04 NOTE — PROGRESS NOTE ADULT - ATTENDING COMMENTS
Rui De Jesus MD,FACC.  [address] [facility].  [facility].  [phone]

## 2017-09-04 NOTE — CONSULT NOTE ADULT - SUBJECTIVE AND OBJECTIVE BOX
EP ATTENDING      HISTORY OF PRESENT ILLNESS: He is a pleasant 56 y/o male PMH asthma and hypothyroidism admitted with asthma exacerbation. He was given several nebs, which resulted in new onset atrial fibrillation. Echo and TSH pending. He denies palpitations. CHADS-VASc not yet established, but 0 so far.      PAST MEDICAL & SURGICAL HISTORY:  Hypothyroidism  Asthma  paroxysmal atrial fibrillation  H/O knee surgery      MEDICATIONS  (STANDING):  mometasone 220 MICROgram(s) Inhaler 1 Puff(s) Inhalation two times a day  sodium chloride 0.9% lock flush 3 milliLiter(s) IV Push every 8 hours  diltiazem    Tablet 30 milliGRAM(s) Oral four times a day  enoxaparin Injectable 150 milliGRAM(s) SubCutaneous every 12 hours    No Known Allergies      FAMILY HISTORY:  No pertinent family history in first degree relatives    Non-contributary for premature coronary disease or sudden cardiac death    SOCIAL HISTORY:    [x ] Non-smoker  [ ] Smoker  [ ] Alcohol      REVIEW OF SYSTEMS:  [ ]chest pain  [ x ]shortness of breath  [  ]palpitations  [  ]syncope  [ ]near syncope [ ]upper extremity weakness   [ ] lower extremity weakness  [  ]diplopia  [  ]altered mental status   [  ]fevers  [ ]chills [ ]nausea  [ ]vomitting  [  ]dysphagia    [ ]abdominal pain  [ ]melena  [ ]BRBPR    [  ]epistaxis  [  ]rash    [ ]lower extremity edema        [x ] All others negative	  [ ] Unable to obtain    PHYSICAL EXAM:  T(C): 36.4 (09-04-17 @ 06:13), Max: 36.9 (09-03-17 @ 16:16)  HR: 89 (09-04-17 @ 06:13) (89 - 116)  BP: 105/59 (09-04-17 @ 06:13) (100/68 - 138/69)  RR: 17 (09-04-17 @ 06:13) (16 - 22)  SpO2: 97% (09-04-17 @ 06:13) (93% - 97%)  Wt(kg): --    Psychiatry:  Mood & affect appropriate  no JVD  IRR, no murmurs  CTAB  soft nt/nd  no c/c/e    TELEMETRY: 	  rate controlled AF  ECG:  	NSR, normal EKG    Echo: pending   NST: n/a  Cath: n/a  	  	  LABS:	 	                          15.5   8.51  )-----------( 306      ( 04 Sep 2017 05:30 )             49.5     09-04    137  |  101  |  17  ----------------------------<  140<H>  5.4<H>   |  16<L>  |  0.91    Ca    9.0      04 Sep 2017 05:30  Phos  3.7     09-04  Mg     2.4     09-04    TPro  7.3  /  Alb  3.8  /  TBili  0.4  /  DBili  x   /  AST  38  /  ALT  17  /  AlkPhos  91  09-03    proBNP:   Lipid Profile:   HgA1c: Hemoglobin A1C, Whole Blood: 6.1 % (09-04 @ 05:30)    TSH:     ASSESSMENT/PLAN: He is a pleasant 56 y/o male PMH asthma and hypothyroidism admitted with asthma exacerbation. He was given several nebs, which resulted in new onset atrial fibrillation. Echo and TSH pending. He denies palpitations. CHADS-VASc not yet established, but 0 so far.    -check TSH, check echo  -start lovenox until chads-vasc is established  -AF should terminate within 48 hours  -if AF doesn't terminated will recommend KOBE-JEFFREY Blas M.D., [place]  [phone]

## 2017-09-05 LAB
BUN SERPL-MCNC: 18 MG/DL — SIGNIFICANT CHANGE UP (ref 7–23)
CALCIUM SERPL-MCNC: 8.2 MG/DL — LOW (ref 8.4–10.5)
CHLORIDE SERPL-SCNC: 103 MMOL/L — SIGNIFICANT CHANGE UP (ref 98–107)
CO2 SERPL-SCNC: 22 MMOL/L — SIGNIFICANT CHANGE UP (ref 22–31)
CREAT SERPL-MCNC: 0.91 MG/DL — SIGNIFICANT CHANGE UP (ref 0.5–1.3)
GLUCOSE SERPL-MCNC: 96 MG/DL — SIGNIFICANT CHANGE UP (ref 70–99)
POTASSIUM SERPL-MCNC: 4.4 MMOL/L — SIGNIFICANT CHANGE UP (ref 3.5–5.3)
POTASSIUM SERPL-SCNC: 4.4 MMOL/L — SIGNIFICANT CHANGE UP (ref 3.5–5.3)
SODIUM SERPL-SCNC: 140 MMOL/L — SIGNIFICANT CHANGE UP (ref 135–145)
T3 SERPL-MCNC: 116 NG/DL — SIGNIFICANT CHANGE UP (ref 80–200)
T4 FREE SERPL-MCNC: 1.02 NG/DL — SIGNIFICANT CHANGE UP (ref 0.9–1.8)
TSH SERPL-MCNC: 4.81 UIU/ML — HIGH (ref 0.27–4.2)

## 2017-09-05 PROCEDURE — 93306 TTE W/DOPPLER COMPLETE: CPT | Mod: 26

## 2017-09-05 RX ORDER — ASPIRIN/CALCIUM CARB/MAGNESIUM 324 MG
325 TABLET ORAL DAILY
Qty: 0 | Refills: 0 | Status: DISCONTINUED | OUTPATIENT
Start: 2017-09-06 | End: 2017-09-06

## 2017-09-05 RX ADMIN — SODIUM CHLORIDE 3 MILLILITER(S): 9 INJECTION INTRAMUSCULAR; INTRAVENOUS; SUBCUTANEOUS at 05:26

## 2017-09-05 RX ADMIN — MOMETASONE FUROATE 1 PUFF(S): 220 INHALANT RESPIRATORY (INHALATION) at 08:47

## 2017-09-05 RX ADMIN — ENOXAPARIN SODIUM 150 MILLIGRAM(S): 100 INJECTION SUBCUTANEOUS at 05:27

## 2017-09-05 RX ADMIN — MOMETASONE FUROATE 1 PUFF(S): 220 INHALANT RESPIRATORY (INHALATION) at 21:40

## 2017-09-05 RX ADMIN — SODIUM CHLORIDE 3 MILLILITER(S): 9 INJECTION INTRAMUSCULAR; INTRAVENOUS; SUBCUTANEOUS at 21:41

## 2017-09-05 RX ADMIN — Medication 60 MILLIGRAM(S): at 05:27

## 2017-09-05 RX ADMIN — SODIUM CHLORIDE 3 MILLILITER(S): 9 INJECTION INTRAMUSCULAR; INTRAVENOUS; SUBCUTANEOUS at 13:00

## 2017-09-05 NOTE — PROGRESS NOTE ADULT - ASSESSMENT
· Assessment		  57M admitted for Asthma exacerbation and new onset JOSH     Problem/Plan - 1:  ·  Problem: Asthma exacerbation.  Plan: Xopenex treatment Q6* PRN  daily peak flows.     Problem/Plan - 2:  ·  Problem: Atrial fibrillation, unspecified type.CHADS2-1  Plan: CM  F/ U CE ,EKG, TTE, TSH  Give Lovenox 150 mg sub cut x1  Rate control with low dose CCB.EP evaluation-Dr Blas- check TSH, check echo  -start lovenox until chads-vasc is established  -AF should terminate within 48 hours  -if AF doesn't terminated will recommend KOBE-DCCV    Problem/Plan - 3:  ·  Problem: Hypothyroidism.  Plan: Not on any medications  F/U TSH.     Problem/Plan - 4:  ·  Problem: Need for prophylactic measure.  Plan: Given Lovenox 150 mg sub cut x1  Then in 12 hours Lovenox 40 mg sub cut daily.

## 2017-09-05 NOTE — PROGRESS NOTE ADULT - SUBJECTIVE AND OBJECTIVE BOX
EP ATTENDING    TSH unremarkable, Echo pending    tele: remains in rate controlled AF    no palpitations, no syncope, no angina    levalbuterol Inhalation 0.63 milliGRAM(s) Inhalation every 6 hours PRN  mometasone 220 MICROgram(s) Inhaler 1 Puff(s) Inhalation two times a day  sodium chloride 0.9% lock flush 3 milliLiter(s) IV Push every 8 hours  diltiazem    Tablet 30 milliGRAM(s) Oral four times a day  enoxaparin Injectable 150 milliGRAM(s) SubCutaneous every 12 hours  thyroid 60 milliGRAM(s) Oral daily                            15.5   8.51  )-----------( 306      ( 04 Sep 2017 05:30 )             49.5       09-05    140  |  103  |  18  ----------------------------<  96  4.4   |  22  |  0.91    Ca    8.2<L>      05 Sep 2017 06:30  Phos  3.7     09-04  Mg     2.4     09-04    TPro  7.3  /  Alb  3.8  /  TBili  0.4  /  DBili  x   /  AST  38  /  ALT  17  /  AlkPhos  91  09-03      CARDIAC MARKERS ( 04 Sep 2017 00:49 )  x     / < 0.06 ng/mL / 202 u/L / x     / x      CARDIAC MARKERS ( 03 Sep 2017 17:53 )  x     / < 0.06 ng/mL / 206 u/L / 3.85 ng/mL / x            T(C): 36.3 (09-05-17 @ 05:30), Max: 37 (09-04-17 @ 21:11)  HR: 86 (09-05-17 @ 05:30) (79 - 93)  BP: 101/66 (09-05-17 @ 05:30) (101/60 - 111/75)  RR: 18 (09-05-17 @ 05:30) (18 - 20)  SpO2: 98% (09-05-17 @ 05:30) (94% - 99%)  Wt(kg): --    no JVD  IRR, no murmurs  CTAB  soft nt/nd  no c/c/e    echo pending (I prioritized)      ASSESSMENT/PLAN: He is a pleasant 58 y/o male PMH asthma and hypothyroidism admitted with asthma exacerbation. He was given several nebs, which resulted in new onset atrial fibrillation. TSH unremarkable, but echo pending. He denies palpitations. CHADS-VASc not yet established, but 0 so far.    -check echo. If unremarkable then change lovenox to asa 325mg daily (CHADS-VASC would be 0 with a normal echo).  -AF should terminate within 48 hours  -if AF doesn't terminated will recommend KOBE-DCCV tomorrow  -NPO after midnight in case KOBE-DCCV needed      Ai Blas MD, [facility]  [phone]        Ai Blas M.D., [facility]  [phone] EP ATTENDING    TSH unremarkable, Echo pending    tele: remains in rate controlled AF    no palpitations, no syncope, no angina    levalbuterol Inhalation 0.63 milliGRAM(s) Inhalation every 6 hours PRN  mometasone 220 MICROgram(s) Inhaler 1 Puff(s) Inhalation two times a day  sodium chloride 0.9% lock flush 3 milliLiter(s) IV Push every 8 hours  diltiazem    Tablet 30 milliGRAM(s) Oral four times a day  enoxaparin Injectable 150 milliGRAM(s) SubCutaneous every 12 hours  thyroid 60 milliGRAM(s) Oral daily                            15.5   8.51  )-----------( 306      ( 04 Sep 2017 05:30 )             49.5       09-05    140  |  103  |  18  ----------------------------<  96  4.4   |  22  |  0.91    Ca    8.2<L>      05 Sep 2017 06:30  Phos  3.7     09-04  Mg     2.4     09-04    TPro  7.3  /  Alb  3.8  /  TBili  0.4  /  DBili  x   /  AST  38  /  ALT  17  /  AlkPhos  91  09-03      CARDIAC MARKERS ( 04 Sep 2017 00:49 )  x     / < 0.06 ng/mL / 202 u/L / x     / x      CARDIAC MARKERS ( 03 Sep 2017 17:53 )  x     / < 0.06 ng/mL / 206 u/L / 3.85 ng/mL / x            T(C): 36.3 (09-05-17 @ 05:30), Max: 37 (09-04-17 @ 21:11)  HR: 86 (09-05-17 @ 05:30) (79 - 93)  BP: 101/66 (09-05-17 @ 05:30) (101/60 - 111/75)  RR: 18 (09-05-17 @ 05:30) (18 - 20)  SpO2: 98% (09-05-17 @ 05:30) (94% - 99%)  Wt(kg): --    no JVD  IRR, no murmurs  CTAB  soft nt/nd  no c/c/e    echo pending (I prioritized)      ASSESSMENT/PLAN: He is a pleasant 56 y/o male PMH asthma and hypothyroidism admitted with asthma exacerbation. He was given several nebs, which resulted in new onset atrial fibrillation. TSH unremarkable, but echo pending. He denies palpitations. CHADS-VASc not yet established, but 0 so far.    -check echo. If unremarkable then change lovenox to asa 325mg daily (CHADS-VASC would be 0 with a normal echo).  -AF should terminate within 48 hours  -if AF doesn't terminated will recommend KOBE-DCCV tomorrow  -NPO after midnight in case KOBE-DCCV needed      Ai Blas MD, [facility]  [phone]

## 2017-09-05 NOTE — PROGRESS NOTE ADULT - SUBJECTIVE AND OBJECTIVE BOX
HPI:HPI:  57M with a history of asthma with multiple hospitalizations, no intubations, on ventolin prn and hypothyroidism, experiencing worsening shortness of breath while at rest, which is new from his usual asthma. Admits to a cough productive of white sputum. Denies fevers, chills, chest pain, lower ext edema, recent surgery, recent travel or any other complaints. Used his ventolin inhaler more then 4x today.    In the Ed, the pt received DUO NEBS---> new onset JOSH  Currently his SOB has improved.Remains in atrial Fibrillation-EP consult noted     PAST MEDICAL & SURGICAL HISTORY:  Hypothyroidism  Asthma  H/O knee surgery      MEDICATIONS  (STANDING):  mometasone 220 MICROgram(s) Inhaler 1 Puff(s) Inhalation two times a day  sodium chloride 0.9% lock flush 3 milliLiter(s) IV Push every 8 hours  diltiazem    Tablet 30 milliGRAM(s) Oral four times a day  enoxaparin Injectable 150 milliGRAM(s) SubCutaneous every 12 hours  thyroid 60 milliGRAM(s) Oral daily    MEDICATIONS  (PRN):  levalbuterol Inhalation 0.63 milliGRAM(s) Inhalation every 6 hours PRN shortness of breath, wheezing      FAMILY HISTORY:  No pertinent family history in first degree relatives    No family history of premature coronary artery disease or sudden cardiac death      REVIEW OF SYSTEMS:  Constitutional: [ ] fever, [ ]weight loss,  [ ]fatigue  Eyes: [ ] visual changes  Respiratory: [x ]shortness of breath;  [x ] cough, [x ]wheezing, [ ]chills, [ ]hemoptysis  Cardiovascular: [ ] chest pain, [ ]palpitations, [x ]dizziness,  [ ]leg swelling  Gastrointestinal: [ ] abdominal pain, [ ]nausea, [ ]vomiting,  [ ]diarrhea   Genitourinary: [ ] dysuria, [ ] hematuria  Neurologic: [ ] headaches [ ] tremors  Skin: [ ] itching, [ ]burning, [ ] rashes  Endocrine: [ ] heat or cold intolerance  Musculoskeletal: [ ] joint pain or swelling; [ ] muscle, back, or extremity pain  Psychiatric: [ ] depression, [ ]anxiety, [ ]mood swings, or [ ]difficulty sleeping  Hematologic: [ ] easy bruising, [ ] bleeding gums       [ x] All others negative	  [ ] Unable to obtain    Vital Signs Last 24 Hrs  T(C): 36.3 (05 Sep 2017 05:30), Max: 37 (04 Sep 2017 21:11)  T(F): 97.3 (05 Sep 2017 05:30), Max: 98.6 (04 Sep 2017 21:11)  HR: 86 (05 Sep 2017 05:30) (79 - 93)  BP: 101/66 (05 Sep 2017 05:30) (101/60 - 111/75)  BP(mean): 69 (04 Sep 2017 21:11) (69 - 74)  RR: 18 (05 Sep 2017 05:30) (18 - 20)  SpO2: 98% (05 Sep 2017 05:30) (94% - 99%)  I&O's Summary      PHYSICAL EXAM:  General: No acute distress BMI-44  HEENT: EOMI, PERRL  Neck: Supple, No JVD  Lungs: Clear to percussion bilaterally; No rales or wheezing  Heart: Irregular irregular rhythm; 2/5 sytolic murmur,No rubs, or gallops  Abdomen: Nontender, bowel sounds present,obese  Extremities: No clubbing, cyanosis, or edema  Nervous system:  Alert & Oriented X3, no focal deficits  Psychiatric: Normal affect  Skin: No rashes or lesions      LABS:  09-04    137  |  101  |  17  ----------------------------<  140<H>  5.4<H>   |  16<L>  |  0.91    Ca    9.0      04 Sep 2017 05:30  Phos  3.7     09-04  Mg     2.4     09-04    TPro  7.3  /  Alb  3.8  /  TBili  0.4  /  DBili  x   /  AST  38  /  ALT  17  /  AlkPhos  91  09-03    Creatinine Trend: 0.91<--, 1.00<--                        15.5   8.51  )-----------( 306      ( 04 Sep 2017 05:30 )             49.5     PT/INR - ( 03 Sep 2017 18:00 )   PT: 11.4 SEC;   INR: 1.02          PTT - ( 03 Sep 2017 18:00 )  PTT:32.6 SEC    Lipid Panel:   Cardiac Enzymes: CARDIAC MARKERS ( 04 Sep 2017 00:49 )  x     / < 0.06 ng/mL / 202 u/L / x     / x      CARDIAC MARKERS ( 03 Sep 2017 17:53 )  x     / < 0.06 ng/mL / 206 u/L / 3.85 ng/mL / x            09-04 [id number] 6.1      RADIOLOGY:CT ANGIO CHEST (W)AW IC  IMPRESSION:  Suboptimal examination of the pulmonary arteries. No central pulmonary  embolism. Interval resolution of left upper lobe  ground glass nodule previously noted in 10/20/2015.    ECG [my interpretation]:Atrial fibrillation,LAE,No acute ST T abnormalities    TELEMETRY:Atrial Fibrillation-Rate controlled    ECHO:PENDING

## 2017-09-05 NOTE — PROGRESS NOTE ADULT - ATTENDING COMMENTS
Rui De Jesus MD,FACC.  [address] [facility].  [facility].  [phone]

## 2017-09-05 NOTE — PROGRESS NOTE ADULT - ASSESSMENT
Patient is a 57y old  Male who presents with a chief complaint of SOB.     Br Asthma exa:    Xopenex PRN  Daily peak flows  Asmanex    A Fib:  Tele  Cardio f/up  TTE  EP f/up elder.  Tigist.  DC lovenox.

## 2017-09-06 ENCOUNTER — TRANSCRIPTION ENCOUNTER (OUTPATIENT)
Age: 57
End: 2017-09-06

## 2017-09-06 VITALS
RESPIRATION RATE: 18 BRPM | DIASTOLIC BLOOD PRESSURE: 71 MMHG | SYSTOLIC BLOOD PRESSURE: 101 MMHG | HEART RATE: 84 BPM | TEMPERATURE: 97 F | OXYGEN SATURATION: 99 %

## 2017-09-06 LAB
BUN SERPL-MCNC: 21 MG/DL — SIGNIFICANT CHANGE UP (ref 7–23)
CALCIUM SERPL-MCNC: 8.6 MG/DL — SIGNIFICANT CHANGE UP (ref 8.4–10.5)
CHLORIDE SERPL-SCNC: 103 MMOL/L — SIGNIFICANT CHANGE UP (ref 98–107)
CO2 SERPL-SCNC: 24 MMOL/L — SIGNIFICANT CHANGE UP (ref 22–31)
CREAT SERPL-MCNC: 0.93 MG/DL — SIGNIFICANT CHANGE UP (ref 0.5–1.3)
GLUCOSE SERPL-MCNC: 97 MG/DL — SIGNIFICANT CHANGE UP (ref 70–99)
HCT VFR BLD CALC: 47.2 % — SIGNIFICANT CHANGE UP (ref 39–50)
HGB BLD-MCNC: 15.3 G/DL — SIGNIFICANT CHANGE UP (ref 13–17)
MAGNESIUM SERPL-MCNC: 2.1 MG/DL — SIGNIFICANT CHANGE UP (ref 1.6–2.6)
MCHC RBC-ENTMCNC: 28.8 PG — SIGNIFICANT CHANGE UP (ref 27–34)
MCHC RBC-ENTMCNC: 32.4 % — SIGNIFICANT CHANGE UP (ref 32–36)
MCV RBC AUTO: 88.7 FL — SIGNIFICANT CHANGE UP (ref 80–100)
NRBC # FLD: 0 — SIGNIFICANT CHANGE UP
PLATELET # BLD AUTO: 238 K/UL — SIGNIFICANT CHANGE UP (ref 150–400)
PMV BLD: 10.5 FL — SIGNIFICANT CHANGE UP (ref 7–13)
POTASSIUM SERPL-MCNC: 4.6 MMOL/L — SIGNIFICANT CHANGE UP (ref 3.5–5.3)
POTASSIUM SERPL-SCNC: 4.6 MMOL/L — SIGNIFICANT CHANGE UP (ref 3.5–5.3)
RBC # BLD: 5.32 M/UL — SIGNIFICANT CHANGE UP (ref 4.2–5.8)
RBC # FLD: 13.8 % — SIGNIFICANT CHANGE UP (ref 10.3–14.5)
SODIUM SERPL-SCNC: 140 MMOL/L — SIGNIFICANT CHANGE UP (ref 135–145)
WBC # BLD: 5.02 K/UL — SIGNIFICANT CHANGE UP (ref 3.8–10.5)
WBC # FLD AUTO: 5.02 K/UL — SIGNIFICANT CHANGE UP (ref 3.8–10.5)

## 2017-09-06 RX ORDER — LEVALBUTEROL 1.25 MG/.5ML
2 SOLUTION, CONCENTRATE RESPIRATORY (INHALATION)
Qty: 1 | Refills: 0 | OUTPATIENT
Start: 2017-09-06 | End: 2017-10-06

## 2017-09-06 RX ORDER — ASPIRIN/CALCIUM CARB/MAGNESIUM 324 MG
1 TABLET ORAL
Qty: 30 | Refills: 0 | OUTPATIENT
Start: 2017-09-06 | End: 2017-10-06

## 2017-09-06 RX ORDER — DILTIAZEM HCL 120 MG
1 CAPSULE, EXT RELEASE 24 HR ORAL
Qty: 30 | Refills: 0 | OUTPATIENT
Start: 2017-09-06 | End: 2017-10-06

## 2017-09-06 RX ADMIN — SODIUM CHLORIDE 3 MILLILITER(S): 9 INJECTION INTRAMUSCULAR; INTRAVENOUS; SUBCUTANEOUS at 04:59

## 2017-09-06 RX ADMIN — SODIUM CHLORIDE 3 MILLILITER(S): 9 INJECTION INTRAMUSCULAR; INTRAVENOUS; SUBCUTANEOUS at 13:16

## 2017-09-06 RX ADMIN — MOMETASONE FUROATE 1 PUFF(S): 220 INHALANT RESPIRATORY (INHALATION) at 08:05

## 2017-09-06 NOTE — DISCHARGE NOTE ADULT - CARE PROVIDER_API CALL
Ai Blas), Cardiac Electrophysiology; Cardiovascular Disease; Internal Medicine; Nuclear Cardiology  [address] [facility]  [address]  [place] [address]  Phone: [phone]  Fax: [phone]    Rui De Jesus (FORREST), Cardiology  [address] [place] [address]  Phone: [phone]  Fax: [phone]

## 2017-09-06 NOTE — PROGRESS NOTE ADULT - SUBJECTIVE AND OBJECTIVE BOX
EP ATTENDING    TSH unremarkable, Echo unremarkable    tele: remains in rate controlled AF    no palpitations, no syncope, no angina, no symptoms    levalbuterol Inhalation 0.63 milliGRAM(s) Inhalation every 6 hours PRN  mometasone 220 MICROgram(s) Inhaler 1 Puff(s) Inhalation two times a day  sodium chloride 0.9% lock flush 3 milliLiter(s) IV Push every 8 hours  thyroid 60 milliGRAM(s) Oral daily  aspirin 325 milliGRAM(s) Oral daily  diltiazem    Tablet 30 milliGRAM(s) Oral two times a day                            15.3   5.02  )-----------( 238      ( 06 Sep 2017 06:30 )             47.2       09-06    140  |  103  |  21  ----------------------------<  97  4.6   |  24  |  0.93    Ca    8.6      06 Sep 2017 06:30  Mg     2.1     09-06        CARDIAC MARKERS ( 04 Sep 2017 00:49 )  x     / < 0.06 ng/mL / 202 u/L / x     / x      CARDIAC MARKERS ( 03 Sep 2017 17:53 )  x     / < 0.06 ng/mL / 206 u/L / 3.85 ng/mL / x            T(C): 36.3 (09-06-17 @ 13:16), Max: 37 (09-05-17 @ 20:29)  HR: 84 (09-06-17 @ 13:16) (84 - 100)  BP: 101/71 (09-06-17 @ 13:16) (96/59 - 139/78)  RR: 18 (09-06-17 @ 13:16) (18 - 18)  SpO2: 99% (09-06-17 @ 13:16) (94% - 100%)  Wt(kg): --    no JVD  IRR, no murmurs  CTAB  soft nt/nd  no c/c/e      ASSESSMENT/PLAN: He is a pleasant 58 y/o male PMH asthma and hypothyroidism admitted with asthma exacerbation. He was given several nebs, which resulted in new onset asymptomatic atrial fibrillation. TSH and echo unremarkable, CHADS-VASC 0.    -ASA 325mg daily  -change cardizem to CD 120mg daily  -considering he has no symptoms would defer KOBE-DCCV for now  -d/c home  -no further inpatient EP workup needed at this time      Ai Blas MD, [facility]  [phone] EP ATTENDING    TSH unremarkable, Echo unremarkable    tele: remains in rate controlled AF    no palpitations, no syncope, no angina, no symptoms    levalbuterol Inhalation 0.63 milliGRAM(s) Inhalation every 6 hours PRN  mometasone 220 MICROgram(s) Inhaler 1 Puff(s) Inhalation two times a day  sodium chloride 0.9% lock flush 3 milliLiter(s) IV Push every 8 hours  thyroid 60 milliGRAM(s) Oral daily  aspirin 325 milliGRAM(s) Oral daily  diltiazem    Tablet 30 milliGRAM(s) Oral two times a day                            15.3   5.02  )-----------( 238      ( 06 Sep 2017 06:30 )             47.2       09-06    140  |  103  |  21  ----------------------------<  97  4.6   |  24  |  0.93    Ca    8.6      06 Sep 2017 06:30  Mg     2.1     09-06        CARDIAC MARKERS ( 04 Sep 2017 00:49 )  x     / < 0.06 ng/mL / 202 u/L / x     / x      CARDIAC MARKERS ( 03 Sep 2017 17:53 )  x     / < 0.06 ng/mL / 206 u/L / 3.85 ng/mL / x            T(C): 36.3 (09-06-17 @ 13:16), Max: 37 (09-05-17 @ 20:29)  HR: 84 (09-06-17 @ 13:16) (84 - 100)  BP: 101/71 (09-06-17 @ 13:16) (96/59 - 139/78)  RR: 18 (09-06-17 @ 13:16) (18 - 18)  SpO2: 99% (09-06-17 @ 13:16) (94% - 100%)  Wt(kg): --    no JVD  IRR, no murmurs  CTAB  soft nt/nd  no c/c/e      ASSESSMENT/PLAN: He is a pleasant 58 y/o male PMH asthma and hypothyroidism admitted with asthma exacerbation. He was given several nebs, which resulted in new onset asymptomatic atrial fibrillation. TSH and echo unremarkable, CHADS-VASC 0.    -ASA 325mg daily  -change cardizem to CD 120mg daily  -considering he has no symptoms would defer KOBE-DCCV for now  -d/c home today  -no further inpatient EP workup needed at this time  -d/w Dr. De Jesus and tele PA Liliana Blas MD, [facility]  [phone]

## 2017-09-06 NOTE — DISCHARGE NOTE ADULT - PATIENT PORTAL LINK FT
“You can access the [facility] Patient Portal, offered by [facility], by registering with the following website: [email or web address]”

## 2017-09-06 NOTE — DISCHARGE NOTE ADULT - MEDICATION SUMMARY - MEDICATIONS TO STOP TAKING
I will STOP taking the medications listed below when I get home from the hospital:    predniSONE 50 mg oral tablet  -- 1 tab(s) by mouth once a day x 4 days  -- It is very important that you take or use this exactly as directed.  Do not skip doses or discontinue unless directed by your doctor.  Obtain medical advice before taking any non-prescription drugs as some may affect the action of this medication.  Take with food or milk.    ProAir HFA 90 mcg/inh inhalation aerosol  -- 2 puff(s) inhaled 4 times a day    predniSONE 20 mg oral tablet  -- 2 tab(s) by mouth once a day  -- It is very important that you take or use this exactly as directed.  Do not skip doses or discontinue unless directed by your doctor.  Obtain medical advice before taking any non-prescription drugs as some may affect the action of this medication.  Take with food or milk.

## 2017-09-06 NOTE — DISCHARGE NOTE ADULT - PLAN OF CARE
continue your synthroid as ordered, check TFTs in 4-6 weeks continue your inhalers as ordered albuterol was changed to Xopenex to prevent tachycardia, follow up with your PCP/ Pulmonologist in 1-2 weeks rate control and anticoagulation your were started on Cardizem CD 120mg daily for rate control and Aspirin 325mg to prevent clots   follow up with Dr. Blas in 1-2 weeks

## 2017-09-06 NOTE — DISCHARGE NOTE ADULT - MEDICATION SUMMARY - MEDICATIONS TO TAKE
I will START or STAY ON the medications listed below when I get home from the hospital:    aspirin 325 mg oral tablet  -- 1 tab(s) by mouth once a day  -- Indication: For Atrial fibrillation    dilTIAZem 120 mg/24 hours oral capsule, extended release  -- 1 cap(s) by mouth once a day  -- Indication: For Atrial fibrillation    Xopenex HFA 45 mcg/inh inhalation aerosol  -- 2 puff(s) inhaled every 4 hours, As Needed -for shortness of breath and/or wheezing  -- For inhalation only.  Shake well before use.    -- Indication: For Asthma exacerbation    Asmanex Twisthaler 60 Dose 220 mcg/inh inhalation aerosol powder  -- 1 puff(s) inhaled 2 times a day  -- Indication: For Asthma exacerbation    [place] Thyroid 60 mg oral tablet  -- 1 tab(s) by mouth once a day  -- Indication: For Hypothyroidism

## 2017-09-06 NOTE — DISCHARGE NOTE ADULT - CARE PLAN
Principal Discharge DX:	Atrial fibrillation  Goal:	rate control and anticoagulation  Instructions for follow-up, activity and diet:	your were started on Cardizem CD 120mg daily for rate control and Aspirin 325mg to prevent clots   follow up with Dr. Blas in 1-2 weeks  Secondary Diagnosis:	Asthma exacerbation  Instructions for follow-up, activity and diet:	continue your inhalers as ordered albuterol was changed to Xopenex to prevent tachycardia, follow up with your PCP/ Pulmonologist in 1-2 weeks  Secondary Diagnosis:	Hypothyroidism  Instructions for follow-up, activity and diet:	continue your synthroid as ordered, check TFTs in 4-6 weeks

## 2017-09-06 NOTE — DISCHARGE NOTE ADULT - HOSPITAL COURSE
57M with a history of asthma with multiple hospitalizations, no intubations, on ventolin prn and hypothyroidism, experiencing worsening shortness of breath while at rest, which is new from his usual asthma. Admits to a cough productive of white sputum. Denies fevers, chills, chest pain, lower ext edema, recent surgery, recent travel or any other complaints. Used his ventolin inhaler more then 4x today.    In the Ed, the pt received DUO NEBS---> new onset JOSH  Currently his SOB has improved.     CTPA=Suboptimal examination of the pulmonary arteries. No central pulmonary  embolism.  CE negative x 2  D Dimer< 150  K+= 5.4 hemolysis   BUN/ creatinine= 18/ 1.0  EKG A FIB   On Cardizem 30 mg po QID  On Lovenox 150 mg sub cut x1  CXR: clear lungs   EP: -check TSH, check echo  -start lovenox until chads-vasc is established  -AF should terminate within 48 hours  -if AF doesn't terminated will recommend KOBE-DCCV  Ep: -check echo. If unremarkable then change lovenox to asa 325mg daily (CHADS-VASC would be 0 with a normal echo).  -AF should terminate within 48 hours  -if AF doesn't terminated will recommend KOBE-DCCV tomorrow  -NPO after midnight in case KOBE-DCCV needed  9/5 Echo:1. Normal mitral valve. Minimal mitral regurgitation.  2. Endocardium not well visualized; grossly low normal left  ventricular systolic function.  Endocardial visualization  enhanced with intravenous injection of echo contrast  (Definity).  3. The right ventricle is not well visualized; grossly  normal right ventricular systolic function.   9/5 cards:asthma and new onset afib, awwaiting TTE, continue xopenex and dialy peak flow, start lovenox 150mg then 12hr later lovenox 40mg sq daily, if afib does not terminate within 48hr would recommend KOBE-DCCV  9/5 EP:TSH unremarkable, check TTE, if unremarkable change lovenox onf 325mg asa daily . if AF does not terminate KOBE-DCCV tomorrow. NPO MN  9/6 EP: -ASA 325mg daily  -change cardizem to CD 120mg daily  -considering he has no symptoms would defer KOBE-DCCV for now  -d/c home today  -no further inpatient EP workup needed at this time  -d/w Dr. De Jesus and tele [place]

## 2017-09-07 ENCOUNTER — APPOINTMENT (OUTPATIENT)
Dept: PULMONOLOGY | Facility: CLINIC | Age: 57
End: 2017-09-07
Payer: COMMERCIAL

## 2017-09-07 VITALS
HEART RATE: 106 BPM | DIASTOLIC BLOOD PRESSURE: 80 MMHG | WEIGHT: 315 LBS | RESPIRATION RATE: 16 BRPM | BODY MASS INDEX: 38.36 KG/M2 | SYSTOLIC BLOOD PRESSURE: 130 MMHG | HEIGHT: 76 IN | TEMPERATURE: 96.9 F | OXYGEN SATURATION: 94 %

## 2017-09-07 PROCEDURE — 94060 EVALUATION OF WHEEZING: CPT

## 2017-09-07 PROCEDURE — ZZZZZ: CPT

## 2017-09-07 PROCEDURE — 94729 DIFFUSING CAPACITY: CPT

## 2017-09-07 PROCEDURE — 99203 OFFICE O/P NEW LOW 30 MIN: CPT | Mod: 25

## 2017-09-07 PROCEDURE — 94726 PLETHYSMOGRAPHY LUNG VOLUMES: CPT

## 2017-11-15 ENCOUNTER — APPOINTMENT (OUTPATIENT)
Dept: PULMONOLOGY | Facility: CLINIC | Age: 57
End: 2017-11-15

## 2018-01-15 ENCOUNTER — OUTPATIENT (OUTPATIENT)
Dept: OUTPATIENT SERVICES | Facility: HOSPITAL | Age: 58
LOS: 1 days | End: 2018-01-15
Payer: COMMERCIAL

## 2018-01-15 DIAGNOSIS — I20.0 UNSTABLE ANGINA: ICD-10-CM

## 2018-01-15 DIAGNOSIS — Z98.890 OTHER SPECIFIED POSTPROCEDURAL STATES: Chronic | ICD-10-CM

## 2018-01-15 PROCEDURE — 78452 HT MUSCLE IMAGE SPECT MULT: CPT | Mod: 26

## 2018-01-15 PROCEDURE — 93016 CV STRESS TEST SUPVJ ONLY: CPT

## 2018-01-15 PROCEDURE — 78452 HT MUSCLE IMAGE SPECT MULT: CPT

## 2018-01-15 PROCEDURE — A9502: CPT

## 2018-01-15 PROCEDURE — 93018 CV STRESS TEST I&R ONLY: CPT

## 2018-01-15 PROCEDURE — 93017 CV STRESS TEST TRACING ONLY: CPT

## 2018-04-23 NOTE — PROGRESS NOTE ADULT - SUBJECTIVE AND OBJECTIVE BOX
Dermatitis has resolved, but nevus persisted, so given irritation due to location, pt opted for removal   Details per procedure note, reviewed post-op care, will call once pathology results are available  Patient is a 57y old  Male who presents with a chief complaint of SOB (03 Sep 2017 23:26)      SUBJECTIVE / OVERNIGHT EVENTS:   Feels better.  Denies CP/SOB/Palpitation/HA.    MEDICATIONS  (STANDING):  mometasone 220 MICROgram(s) Inhaler 1 Puff(s) Inhalation two times a day  sodium chloride 0.9% lock flush 3 milliLiter(s) IV Push every 8 hours  diltiazem    Tablet 30 milliGRAM(s) Oral four times a day  thyroid 60 milliGRAM(s) Oral daily    MEDICATIONS  (PRN):  levalbuterol Inhalation 0.63 milliGRAM(s) Inhalation every 6 hours PRN shortness of breath, wheezing        CAPILLARY BLOOD GLUCOSE        I&O's Summary      PHYSICAL EXAM:  GENERAL: NAD, well-developed  HEAD:  Atraumatic, Normocephalic  EYES: conjunctiva and sclera clear  NECK: Supple, No JVD  CHEST/LUNG: Clear to auscultation bilaterally; No wheeze  HEART: Regular rate and rhythm; No murmurs, rubs, or gallops  ABDOMEN: Soft, Nontender, Nondistended; Bowel sounds present  EXTREMITIES:  2+ Peripheral Pulses, No clubbing, cyanosis, or edema  NEUROLOGY: AAO X 3  SKIN: No rashes    LABS:                        15.5   8.51  )-----------( 306      ( 04 Sep 2017 05:30 )             49.5     09-05    140  |  103  |  18  ----------------------------<  96  4.4   |  22  |  0.91    Ca    8.2<L>      05 Sep 2017 06:30  Phos  3.7     09-04  Mg     2.4     09-04        CARDIAC MARKERS ( 04 Sep 2017 00:49 )  x     / < 0.06 ng/mL / 202 u/L / x     / x            CAPILLARY BLOOD GLUCOSE                    RADIOLOGY & ADDITIONAL TESTS:    Imaging Personally Reviewed:    Consultant(s) Notes Reviewed:      Care Discussed with Consultants/Other Providers:

## 2018-09-10 PROBLEM — E03.9 HYPOTHYROIDISM, UNSPECIFIED: Chronic | Status: ACTIVE | Noted: 2017-08-04

## 2018-10-04 ENCOUNTER — APPOINTMENT (OUTPATIENT)
Dept: PULMONOLOGY | Facility: CLINIC | Age: 58
End: 2018-10-04
Payer: COMMERCIAL

## 2018-10-04 VITALS
SYSTOLIC BLOOD PRESSURE: 123 MMHG | RESPIRATION RATE: 16 BRPM | BODY MASS INDEX: 38.36 KG/M2 | HEART RATE: 97 BPM | DIASTOLIC BLOOD PRESSURE: 82 MMHG | WEIGHT: 315 LBS | OXYGEN SATURATION: 91 % | HEIGHT: 76 IN | TEMPERATURE: 97.3 F

## 2018-10-04 DIAGNOSIS — R06.83 SNORING: ICD-10-CM

## 2018-10-04 DIAGNOSIS — E66.2 MORBID (SEVERE) OBESITY WITH ALVEOLAR HYPOVENTILATION: ICD-10-CM

## 2018-10-04 DIAGNOSIS — E66.01 MORBID (SEVERE) OBESITY DUE TO EXCESS CALORIES: ICD-10-CM

## 2018-10-04 DIAGNOSIS — J45.909 UNSPECIFIED ASTHMA, UNCOMPLICATED: ICD-10-CM

## 2018-10-04 DIAGNOSIS — G47.19 OTHER HYPERSOMNIA: ICD-10-CM

## 2018-10-04 PROCEDURE — 99214 OFFICE O/P EST MOD 30 MIN: CPT

## 2018-10-04 RX ORDER — FLUTICASONE PROPIONATE 220 UG/1
AEROSOL, METERED RESPIRATORY (INHALATION)
Refills: 0 | Status: ACTIVE | COMMUNITY

## 2019-01-21 ENCOUNTER — INPATIENT (INPATIENT)
Facility: HOSPITAL | Age: 59
LOS: 6 days | Discharge: HOME | End: 2019-01-28
Attending: INTERNAL MEDICINE | Admitting: INTERNAL MEDICINE
Payer: COMMERCIAL

## 2019-01-21 VITALS
OXYGEN SATURATION: 93 % | HEART RATE: 102 BPM | TEMPERATURE: 98 F | RESPIRATION RATE: 18 BRPM | DIASTOLIC BLOOD PRESSURE: 78 MMHG | SYSTOLIC BLOOD PRESSURE: 132 MMHG

## 2019-01-21 DIAGNOSIS — J45.901 UNSPECIFIED ASTHMA WITH (ACUTE) EXACERBATION: ICD-10-CM

## 2019-01-21 DIAGNOSIS — E03.9 HYPOTHYROIDISM, UNSPECIFIED: ICD-10-CM

## 2019-01-21 DIAGNOSIS — I48.91 UNSPECIFIED ATRIAL FIBRILLATION: ICD-10-CM

## 2019-01-21 DIAGNOSIS — Z98.890 OTHER SPECIFIED POSTPROCEDURAL STATES: Chronic | ICD-10-CM

## 2019-01-21 DIAGNOSIS — Z29.9 ENCOUNTER FOR PROPHYLACTIC MEASURES, UNSPECIFIED: ICD-10-CM

## 2019-01-21 LAB
ALBUMIN SERPL ELPH-MCNC: 3.9 G/DL — SIGNIFICANT CHANGE UP (ref 3.3–5)
ALP SERPL-CCNC: 102 U/L — SIGNIFICANT CHANGE UP (ref 40–120)
ALT FLD-CCNC: 16 U/L — SIGNIFICANT CHANGE UP (ref 4–41)
ANION GAP SERPL CALC-SCNC: 12 MMO/L — SIGNIFICANT CHANGE UP (ref 7–14)
APTT BLD: 30.4 SEC — SIGNIFICANT CHANGE UP (ref 27.5–36.3)
AST SERPL-CCNC: 12 U/L — SIGNIFICANT CHANGE UP (ref 4–40)
B PERT DNA SPEC QL NAA+PROBE: NOT DETECTED — SIGNIFICANT CHANGE UP
BASE EXCESS BLDV CALC-SCNC: 1.3 MMOL/L — SIGNIFICANT CHANGE UP
BASOPHILS # BLD AUTO: 0.05 K/UL — SIGNIFICANT CHANGE UP (ref 0–0.2)
BASOPHILS NFR BLD AUTO: 1 % — SIGNIFICANT CHANGE UP (ref 0–2)
BILIRUB SERPL-MCNC: 0.5 MG/DL — SIGNIFICANT CHANGE UP (ref 0.2–1.2)
BLOOD GAS VENOUS - CREATININE: 0.71 MG/DL — SIGNIFICANT CHANGE UP (ref 0.5–1.3)
BUN SERPL-MCNC: 10 MG/DL — SIGNIFICANT CHANGE UP (ref 7–23)
C PNEUM DNA SPEC QL NAA+PROBE: NOT DETECTED — SIGNIFICANT CHANGE UP
CALCIUM SERPL-MCNC: 9.1 MG/DL — SIGNIFICANT CHANGE UP (ref 8.4–10.5)
CHLORIDE BLDV-SCNC: 106 MMOL/L — SIGNIFICANT CHANGE UP (ref 96–108)
CHLORIDE SERPL-SCNC: 102 MMOL/L — SIGNIFICANT CHANGE UP (ref 98–107)
CO2 SERPL-SCNC: 22 MMOL/L — SIGNIFICANT CHANGE UP (ref 22–31)
CREAT SERPL-MCNC: 0.86 MG/DL — SIGNIFICANT CHANGE UP (ref 0.5–1.3)
EOSINOPHIL # BLD AUTO: 0.95 K/UL — HIGH (ref 0–0.5)
EOSINOPHIL NFR BLD AUTO: 18.3 % — HIGH (ref 0–6)
FLUAV H1 2009 PAND RNA SPEC QL NAA+PROBE: NOT DETECTED — SIGNIFICANT CHANGE UP
FLUAV H1 RNA SPEC QL NAA+PROBE: NOT DETECTED — SIGNIFICANT CHANGE UP
FLUAV H3 RNA SPEC QL NAA+PROBE: NOT DETECTED — SIGNIFICANT CHANGE UP
FLUAV SUBTYP SPEC NAA+PROBE: NOT DETECTED — SIGNIFICANT CHANGE UP
FLUBV RNA SPEC QL NAA+PROBE: NOT DETECTED — SIGNIFICANT CHANGE UP
GAS PNL BLDV: 133 MMOL/L — LOW (ref 136–146)
GLUCOSE BLDV-MCNC: 102 — HIGH (ref 70–99)
GLUCOSE SERPL-MCNC: 99 MG/DL — SIGNIFICANT CHANGE UP (ref 70–99)
HADV DNA SPEC QL NAA+PROBE: NOT DETECTED — SIGNIFICANT CHANGE UP
HCO3 BLDV-SCNC: 25 MMOL/L — SIGNIFICANT CHANGE UP (ref 20–27)
HCOV PNL SPEC NAA+PROBE: SIGNIFICANT CHANGE UP
HCT VFR BLD CALC: 48.2 % — SIGNIFICANT CHANGE UP (ref 39–50)
HCT VFR BLDV CALC: 47.4 % — SIGNIFICANT CHANGE UP (ref 39–51)
HGB BLD-MCNC: 15.4 G/DL — SIGNIFICANT CHANGE UP (ref 13–17)
HGB BLDV-MCNC: 15.5 G/DL — SIGNIFICANT CHANGE UP (ref 13–17)
HMPV RNA SPEC QL NAA+PROBE: NOT DETECTED — SIGNIFICANT CHANGE UP
HPIV1 RNA SPEC QL NAA+PROBE: NOT DETECTED — SIGNIFICANT CHANGE UP
HPIV2 RNA SPEC QL NAA+PROBE: NOT DETECTED — SIGNIFICANT CHANGE UP
HPIV3 RNA SPEC QL NAA+PROBE: NOT DETECTED — SIGNIFICANT CHANGE UP
HPIV4 RNA SPEC QL NAA+PROBE: NOT DETECTED — SIGNIFICANT CHANGE UP
IMM GRANULOCYTES NFR BLD AUTO: 0.2 % — SIGNIFICANT CHANGE UP (ref 0–1.5)
INR BLD: 1.13 — SIGNIFICANT CHANGE UP (ref 0.88–1.17)
LACTATE BLDV-MCNC: 1.2 MMOL/L — SIGNIFICANT CHANGE UP (ref 0.5–2)
LYMPHOCYTES # BLD AUTO: 1.44 K/UL — SIGNIFICANT CHANGE UP (ref 1–3.3)
LYMPHOCYTES # BLD AUTO: 27.7 % — SIGNIFICANT CHANGE UP (ref 13–44)
MCHC RBC-ENTMCNC: 28.2 PG — SIGNIFICANT CHANGE UP (ref 27–34)
MCHC RBC-ENTMCNC: 32 % — SIGNIFICANT CHANGE UP (ref 32–36)
MCV RBC AUTO: 88.3 FL — SIGNIFICANT CHANGE UP (ref 80–100)
MONOCYTES # BLD AUTO: 0.47 K/UL — SIGNIFICANT CHANGE UP (ref 0–0.9)
MONOCYTES NFR BLD AUTO: 9.1 % — SIGNIFICANT CHANGE UP (ref 2–14)
NEUTROPHILS # BLD AUTO: 2.27 K/UL — SIGNIFICANT CHANGE UP (ref 1.8–7.4)
NEUTROPHILS NFR BLD AUTO: 43.7 % — SIGNIFICANT CHANGE UP (ref 43–77)
NRBC # FLD: 0 K/UL — LOW (ref 25–125)
NT-PROBNP SERPL-SCNC: 217.5 PG/ML — SIGNIFICANT CHANGE UP
PCO2 BLDV: 43 MMHG — SIGNIFICANT CHANGE UP (ref 41–51)
PH BLDV: 7.39 PH — SIGNIFICANT CHANGE UP (ref 7.32–7.43)
PLATELET # BLD AUTO: 275 K/UL — SIGNIFICANT CHANGE UP (ref 150–400)
PMV BLD: 10.3 FL — SIGNIFICANT CHANGE UP (ref 7–13)
PO2 BLDV: 57 MMHG — HIGH (ref 35–40)
POTASSIUM BLDV-SCNC: 4.2 MMOL/L — SIGNIFICANT CHANGE UP (ref 3.4–4.5)
POTASSIUM SERPL-MCNC: 4.4 MMOL/L — SIGNIFICANT CHANGE UP (ref 3.5–5.3)
POTASSIUM SERPL-SCNC: 4.4 MMOL/L — SIGNIFICANT CHANGE UP (ref 3.5–5.3)
PROT SERPL-MCNC: 7.3 G/DL — SIGNIFICANT CHANGE UP (ref 6–8.3)
PROTHROM AB SERPL-ACNC: 12.6 SEC — SIGNIFICANT CHANGE UP (ref 9.8–13.1)
RBC # BLD: 5.46 M/UL — SIGNIFICANT CHANGE UP (ref 4.2–5.8)
RBC # FLD: 14.6 % — HIGH (ref 10.3–14.5)
RSV RNA SPEC QL NAA+PROBE: NOT DETECTED — SIGNIFICANT CHANGE UP
RV+EV RNA SPEC QL NAA+PROBE: NOT DETECTED — SIGNIFICANT CHANGE UP
SAO2 % BLDV: 88.7 % — HIGH (ref 60–85)
SODIUM SERPL-SCNC: 136 MMOL/L — SIGNIFICANT CHANGE UP (ref 135–145)
TROPONIN T, HIGH SENSITIVITY: 10 NG/L — SIGNIFICANT CHANGE UP (ref ?–14)
TROPONIN T, HIGH SENSITIVITY: 12 NG/L — SIGNIFICANT CHANGE UP (ref ?–14)
WBC # BLD: 5.19 K/UL — SIGNIFICANT CHANGE UP (ref 3.8–10.5)
WBC # FLD AUTO: 5.19 K/UL — SIGNIFICANT CHANGE UP (ref 3.8–10.5)

## 2019-01-21 PROCEDURE — 71045 X-RAY EXAM CHEST 1 VIEW: CPT | Mod: 26

## 2019-01-21 RX ORDER — LEVALBUTEROL 1.25 MG/.5ML
0.63 SOLUTION, CONCENTRATE RESPIRATORY (INHALATION) EVERY 6 HOURS
Qty: 0 | Refills: 0 | Status: COMPLETED | OUTPATIENT
Start: 2019-01-21 | End: 2019-01-22

## 2019-01-21 RX ORDER — THYROID 120 MG
60 TABLET ORAL DAILY
Qty: 0 | Refills: 0 | Status: DISCONTINUED | OUTPATIENT
Start: 2019-01-21 | End: 2019-01-28

## 2019-01-21 RX ORDER — DILTIAZEM HCL 120 MG
180 CAPSULE, EXT RELEASE 24 HR ORAL DAILY
Qty: 0 | Refills: 0 | Status: DISCONTINUED | OUTPATIENT
Start: 2019-01-21 | End: 2019-01-28

## 2019-01-21 RX ORDER — LEVALBUTEROL 1.25 MG/.5ML
0.63 SOLUTION, CONCENTRATE RESPIRATORY (INHALATION) EVERY 6 HOURS
Qty: 0 | Refills: 0 | Status: DISCONTINUED | OUTPATIENT
Start: 2019-01-21 | End: 2019-01-28

## 2019-01-21 RX ORDER — IPRATROPIUM/ALBUTEROL SULFATE 18-103MCG
3 AEROSOL WITH ADAPTER (GRAM) INHALATION ONCE
Qty: 0 | Refills: 0 | Status: DISCONTINUED | OUTPATIENT
Start: 2019-01-21 | End: 2019-01-21

## 2019-01-21 RX ORDER — ENOXAPARIN SODIUM 100 MG/ML
40 INJECTION SUBCUTANEOUS EVERY 24 HOURS
Qty: 0 | Refills: 0 | Status: DISCONTINUED | OUTPATIENT
Start: 2019-01-21 | End: 2019-01-28

## 2019-01-21 RX ORDER — ASPIRIN/CALCIUM CARB/MAGNESIUM 324 MG
81 TABLET ORAL DAILY
Qty: 0 | Refills: 0 | Status: DISCONTINUED | OUTPATIENT
Start: 2019-01-21 | End: 2019-01-28

## 2019-01-21 RX ADMIN — LEVALBUTEROL 0.63 MILLIGRAM(S): 1.25 SOLUTION, CONCENTRATE RESPIRATORY (INHALATION) at 14:30

## 2019-01-21 RX ADMIN — ENOXAPARIN SODIUM 40 MILLIGRAM(S): 100 INJECTION SUBCUTANEOUS at 22:52

## 2019-01-21 RX ADMIN — Medication 81 MILLIGRAM(S): at 23:20

## 2019-01-21 RX ADMIN — LEVALBUTEROL 0.63 MILLIGRAM(S): 1.25 SOLUTION, CONCENTRATE RESPIRATORY (INHALATION) at 23:20

## 2019-01-21 RX ADMIN — Medication 40 MILLIGRAM(S): at 22:52

## 2019-01-21 RX ADMIN — Medication 125 MILLIGRAM(S): at 13:48

## 2019-01-21 NOTE — H&P ADULT - RS GEN PE MLT RESP DETAILS PC
airway patent/no intercostal retractions/wheezes/no rales/no rhonchi/no chest wall tenderness/respirations non-labored

## 2019-01-21 NOTE — H&P ADULT - NEGATIVE GASTROINTESTINAL SYMPTOMS
no change in bowel habits/no flatulence/no melena/no hematochezia/no nausea/no vomiting/no diarrhea/no constipation/no abdominal pain

## 2019-01-21 NOTE — H&P ADULT - NEGATIVE NEUROLOGICAL SYMPTOMS
no syncope/no vertigo/no loss of sensation/no confusion/no headache/no paresthesias/no focal seizures/no difficulty walking/no loss of consciousness/no hemiparesis/no transient paralysis/no weakness/no generalized seizures/no tremors

## 2019-01-21 NOTE — H&P ADULT - PROBLEM SELECTOR PLAN 1
Pt with diffuse expiratory wheezing on physical exam consistent with asthma exacerbation  Continue with Xopenex q6hrs PRN given mild tachycardia  Start Solumedrol 40mg IVP q8hrs  Pulm consult called (Dr. Mckeon)  Continue with supplemental oxygen as needed

## 2019-01-21 NOTE — H&P ADULT - NSHPLABSRESULTS_GEN_ALL_CORE
September 2017, Echo: Technically difficult study. Normal mitral valve. Minimal mitral regurgitation. Grossly low normal left ventricular systolic function.  ------------  EKG: Atrial fibrillation at 81 bpm  CE x1: Trop 12  ProBNP: 217.5

## 2019-01-21 NOTE — H&P ADULT - PROBLEM SELECTOR PLAN 2
Monitor on telemetry given HR mildly elevated  Initial trop 12, await delta troponin  Increase Cardizem from 120mg to 180mg daily for better rate control  Avoid duonebs  Continue ASA 81mg daily for A/C given CHADS score 0  Echocardiogram ordered to assess LV and valvular function

## 2019-01-21 NOTE — ED PROVIDER NOTE - PHYSICAL EXAMINATION
speaks in full sentences in no acute resp distress; diffuse exp wheezes noted over b/l lung fields with scares rhonchi  no ext edema noted b/l; no calf

## 2019-01-21 NOTE — ED PROVIDER NOTE - MEDICAL DECISION MAKING DETAILS
BRIDGES, a-fib/asthma, noted to be in a-fib on ekg, unclear why pt is not on AC - will get labs, xopenex, CXR, solumedrol will admit to cardio

## 2019-01-21 NOTE — ED PROVIDER NOTE - OBJECTIVE STATEMENT
Pt is 57 y/o male with PMhx of asthma (never intubated, (+) hospitalized in the past) also hx of a-fib (not on AC) here for eval of non-productive cough, BRIDGES, easy fatigue x 2 wks. Pt states that for the past 2 weeks get gets short of breath easily, mostly with exertion, reports one block exertional dyspnea, denies CP, denies recent travel/prolonged immobilization, personal/family hx of coagulopathies, non smoker,  denies cocaine use, denies congestion, sore throat, fever or recent travel, denies sick contacts. (-) palpitations. Pt has been using rescue inhlaer every 4 hrs with some relief only, not sure of last steroids use; cardio - DR Fabian.

## 2019-01-21 NOTE — H&P ADULT - ASSESSMENT
57 y/o male with a PMHx of atrial fibrillation not on A/C, hypothyroidism and asthma presents to ED with dyspnea on exertion, found to be in asthma exacerbation, admitted for atrial fibrillation with mild tachycardia.

## 2019-01-21 NOTE — H&P ADULT - HISTORY OF PRESENT ILLNESS
57 y/o male with a PMHx of atrial fibrillation not on A/C, hypothyroidism and asthma (prior hospitalizations but never intubated) presents to ED with dyspnea on exertion for the past two weeks. Pt reports worsening shortness of breath over the past couple weeks, which he affiliates to the cold weather. Pt reports that he usually has difficulty breathing in the winter because of the cold, and he actually wears a mask when he goes outside to prevent himself from wheezing. However, despite this, pt notices that he has been wheezing and coughing for the past couple of days. Pt endorses that his wheezing is worse when he is laying flat, which has caused him to sleep sitting upright for the past couple of nights. Pt also admits to a dry cough, with occasional clear sputum production. Pt feels like he is in an asthma exacerbation and has been using his Ventolin inhaler every 6 hours which has provided mild temporary relief. Pt denies fever, chills, recent travel, headache, dizziness, visual deficits, chest pain, palpitations, abdominal pain, N/V/D/C, hematochezia, melena, dysuria, hematuria, LOC, syncope, peripheral edema. Upon arrival to ED, EKG: Atrial fibrillation at 81 bpm. CE x1: Trop 12. ProBNP: 217.5. CXR: No focal consolidations.

## 2019-01-21 NOTE — ED PROVIDER NOTE - ATTENDING CONTRIBUTION TO CARE
Attending note:   After face to face evaluation of this patient, I concur with above noted hx, pe, and care plan for this patient.  59 y/o M with h/o afib, asthma, c/o wheezing for two weeks.   +JOSH on exam,   +Wheeze on exam.    Treatment in progress

## 2019-01-21 NOTE — ED ADULT TRIAGE NOTE - CHIEF COMPLAINT QUOTE
Co BRIDGES x 2 weeks. Pt states "the cold weather is aggravating it". Hx of atrial fibrillation, on Cardizem. Denies chest pain, dizziness.

## 2019-01-22 LAB
ANION GAP SERPL CALC-SCNC: 11 MMO/L — SIGNIFICANT CHANGE UP (ref 7–14)
APTT BLD: 35.5 SEC — SIGNIFICANT CHANGE UP (ref 27.5–36.3)
BASE EXCESS BLDV CALC-SCNC: -1.3 MMOL/L — SIGNIFICANT CHANGE UP
BLOOD GAS VENOUS - CREATININE: 0.96 MG/DL — SIGNIFICANT CHANGE UP (ref 0.5–1.3)
BUN SERPL-MCNC: 21 MG/DL — SIGNIFICANT CHANGE UP (ref 7–23)
CALCIUM SERPL-MCNC: 9.7 MG/DL — SIGNIFICANT CHANGE UP (ref 8.4–10.5)
CHLORIDE BLDV-SCNC: 109 MMOL/L — HIGH (ref 96–108)
CHLORIDE SERPL-SCNC: 103 MMOL/L — SIGNIFICANT CHANGE UP (ref 98–107)
CHOLEST SERPL-MCNC: 200 MG/DL — HIGH (ref 120–199)
CO2 SERPL-SCNC: 22 MMOL/L — SIGNIFICANT CHANGE UP (ref 22–31)
CREAT SERPL-MCNC: 1.07 MG/DL — SIGNIFICANT CHANGE UP (ref 0.5–1.3)
D DIMER BLD IA.RAPID-MCNC: 203 NG/ML — SIGNIFICANT CHANGE UP
D DIMER BLD IA.RAPID-MCNC: 236 NG/ML — SIGNIFICANT CHANGE UP
GAS PNL BLDV: 132 MMOL/L — LOW (ref 136–146)
GLUCOSE BLDV-MCNC: 198 — HIGH (ref 70–99)
GLUCOSE SERPL-MCNC: 166 MG/DL — HIGH (ref 70–99)
HBA1C BLD-MCNC: 5.8 % — HIGH (ref 4–5.6)
HCO3 BLDV-SCNC: 24 MMOL/L — SIGNIFICANT CHANGE UP (ref 20–27)
HCT VFR BLD CALC: 49.3 % — SIGNIFICANT CHANGE UP (ref 39–50)
HCT VFR BLDV CALC: 52.2 % — HIGH (ref 39–51)
HDLC SERPL-MCNC: 44 MG/DL — SIGNIFICANT CHANGE UP (ref 35–55)
HGB BLD-MCNC: 15.8 G/DL — SIGNIFICANT CHANGE UP (ref 13–17)
HGB BLDV-MCNC: 17.1 G/DL — HIGH (ref 13–17)
INR BLD: 1.11 — SIGNIFICANT CHANGE UP (ref 0.88–1.17)
LACTATE BLDV-MCNC: 2.5 MMOL/L — HIGH (ref 0.5–2)
LIPID PNL WITH DIRECT LDL SERPL: 156 MG/DL — SIGNIFICANT CHANGE UP
MAGNESIUM SERPL-MCNC: 2.2 MG/DL — SIGNIFICANT CHANGE UP (ref 1.6–2.6)
MCHC RBC-ENTMCNC: 27.8 PG — SIGNIFICANT CHANGE UP (ref 27–34)
MCHC RBC-ENTMCNC: 32 % — SIGNIFICANT CHANGE UP (ref 32–36)
MCV RBC AUTO: 86.6 FL — SIGNIFICANT CHANGE UP (ref 80–100)
NRBC # FLD: 0 K/UL — LOW (ref 25–125)
PCO2 BLDV: 35 MMHG — LOW (ref 41–51)
PH BLDV: 7.43 PH — SIGNIFICANT CHANGE UP (ref 7.32–7.43)
PLATELET # BLD AUTO: 279 K/UL — SIGNIFICANT CHANGE UP (ref 150–400)
PMV BLD: 10.8 FL — SIGNIFICANT CHANGE UP (ref 7–13)
PO2 BLDV: 61 MMHG — HIGH (ref 35–40)
POTASSIUM BLDV-SCNC: 4.8 MMOL/L — HIGH (ref 3.4–4.5)
POTASSIUM SERPL-MCNC: 5 MMOL/L — SIGNIFICANT CHANGE UP (ref 3.5–5.3)
POTASSIUM SERPL-SCNC: 5 MMOL/L — SIGNIFICANT CHANGE UP (ref 3.5–5.3)
PROTHROM AB SERPL-ACNC: 12.7 SEC — SIGNIFICANT CHANGE UP (ref 9.8–13.1)
RBC # BLD: 5.69 M/UL — SIGNIFICANT CHANGE UP (ref 4.2–5.8)
RBC # FLD: 14.5 % — SIGNIFICANT CHANGE UP (ref 10.3–14.5)
SAO2 % BLDV: 91.5 % — HIGH (ref 60–85)
SODIUM SERPL-SCNC: 136 MMOL/L — SIGNIFICANT CHANGE UP (ref 135–145)
TRIGL SERPL-MCNC: 69 MG/DL — SIGNIFICANT CHANGE UP (ref 10–149)
TSH SERPL-MCNC: 1.06 UIU/ML — SIGNIFICANT CHANGE UP (ref 0.27–4.2)
WBC # BLD: 7.07 K/UL — SIGNIFICANT CHANGE UP (ref 3.8–10.5)
WBC # FLD AUTO: 7.07 K/UL — SIGNIFICANT CHANGE UP (ref 3.8–10.5)

## 2019-01-22 PROCEDURE — 99222 1ST HOSP IP/OBS MODERATE 55: CPT

## 2019-01-22 PROCEDURE — 93306 TTE W/DOPPLER COMPLETE: CPT | Mod: 26

## 2019-01-22 RX ORDER — INFLUENZA VIRUS VACCINE 15; 15; 15; 15 UG/.5ML; UG/.5ML; UG/.5ML; UG/.5ML
0.5 SUSPENSION INTRAMUSCULAR ONCE
Qty: 0 | Refills: 0 | Status: DISCONTINUED | OUTPATIENT
Start: 2019-01-22 | End: 2019-01-28

## 2019-01-22 RX ADMIN — Medication 40 MILLIGRAM(S): at 06:04

## 2019-01-22 RX ADMIN — Medication 81 MILLIGRAM(S): at 12:38

## 2019-01-22 RX ADMIN — Medication 180 MILLIGRAM(S): at 06:04

## 2019-01-22 RX ADMIN — Medication 180 MILLIGRAM(S): at 00:00

## 2019-01-22 RX ADMIN — LEVALBUTEROL 0.63 MILLIGRAM(S): 1.25 SOLUTION, CONCENTRATE RESPIRATORY (INHALATION) at 06:04

## 2019-01-22 RX ADMIN — ENOXAPARIN SODIUM 40 MILLIGRAM(S): 100 INJECTION SUBCUTANEOUS at 22:25

## 2019-01-22 RX ADMIN — Medication 60 MILLIGRAM(S): at 09:14

## 2019-01-22 RX ADMIN — Medication 40 MILLIGRAM(S): at 13:38

## 2019-01-22 RX ADMIN — Medication 40 MILLIGRAM(S): at 22:25

## 2019-01-22 NOTE — PROGRESS NOTE ADULT - ASSESSMENT
Problem/Plan - 1:  ·  Problem: Asthma exacerbation.  Plan: Pt with diffuse expiratory wheezing on physical exam consistent with asthma exacerbation  Continue with Xopenex q6hrs PRN given mild tachycardia  Start Solumedrol 40mg IVP q8hrs  Continue with supplemental oxygen as needed.  Pulmonary consult noted     Problem/Plan - 2:  ·  Problem: AF (atrial fibrillation).  Plan: Monitor on telemetry given HR mildly elevated  Initial trop 12, await delta troponin  Increase Cardizem from 120mg to 180mg daily for better rate control  Avoid duonebs  Continue ASA 81mg daily for A/C given CHADS score 0  Echocardiogram ordered to assess LV and valvular function.     Problem/Plan - 3:  ·  Problem: Hypothyroidism.  Plan: Continue [place] thyroid at current dose  Check TSH levels.     Problem/Plan - 4:  ·  Problem: Need for prophylactic measure.  Plan: Lovenox 40mg SQ daily.

## 2019-01-22 NOTE — CHART NOTE - NSCHARTNOTEFT_GEN_A_CORE
RN notified that patient was desaturating to high 80s on NC. Patient was seen at bedside. Patient on 6L NC. Patient has no complaints.. He states his SOB has improved a lot since he arrived to the hospital. Patient is a mouth breather and not taking deep breaths through the nose.   Lungs: No wheezes, scattered rhonchi  Heart: +S1, +S2  Patient was instructed to take deep breaths through the nose. Will change NC to venti mask for now. O2 improved to 92-93. Patient just received Xopenex and Solumedrol IV. Patient noted to be rapid afib on tele monitor. HR fluctuating between 110-130s. asymptomatic. Will administer Cardizem. Will monitor closely. D-Dimer added with AM labs

## 2019-01-22 NOTE — CONSULT NOTE ADULT - SUBJECTIVE AND OBJECTIVE BOX
Pulmonary Consult Note:    CHIEF COMPLAINT:    HPI:    PAST MEDICAL & SURGICAL HISTORY:  AF (atrial fibrillation)  Hypothyroidism  Asthma  S/P arthroscopic surgery of right knee: Meniscus repair      FAMILY HISTORY:  No pertinent family history in first degree relatives      SOCIAL HISTORY:  Smoking: [ ] Never Smoked [ ] Former Smoker (__ packs x ___ years) [ ] Current Smoker  (__ packs x ___ years)  Substance Use: [ ] Never Used [ ] Used ____  EtOH Use:  Marital Status: [ ] Single [ ]  [ ]  [ ]   Sexual History:   Occupation:  Recent Travel:  Country of Birth:  Advance Directives:    Allergies    No Known Allergies    Intolerances        HOME MEDICATIONS:  Home Medications:  [place] Thyroid 60 mg oral tablet: 1 tab(s) orally once a day (04 Sep 2017 21:55)  aspirin 81 mg oral tablet: 1 tab(s) orally once a day (21 Jan 2019 15:44)  Ventolin HFA 90 mcg/inh inhalation aerosol: 2 puff(s) inhaled 4 times a day, As Needed (21 Jan 2019 15:44)      REVIEW OF SYSTEMS:  CONSTITUTIONAL: No fever, weight loss, or fatigue  EYES: No eye pain, visual disturbances, or discharge  ENMT:  No difficulty hearing, tinnitus, vertigo; No sinus or throat pain  NECK: No pain or stiffness  BREASTS: No pain, masses, or nipple discharge  RESPIRATORY: No cough, wheezing, chills or hemoptysis; No shortness of breath  CARDIOVASCULAR: No chest pain, palpitations, dizziness, or leg swelling  GASTROINTESTINAL: No abdominal or epigastric pain. No nausea, vomiting, or hematemesis; No diarrhea or constipation. No melena or hematochezia.  GENITOURINARY: No dysuria, frequency, hematuria, or incontinence  NEUROLOGICAL: No headaches, memory loss, loss of strength, numbness, or tremors  SKIN: No itching, burning, rashes, or lesions   LYMPH NODES: No enlarged glands  ENDOCRINE: No heat or cold intolerance; No hair loss  MUSCULOSKELETAL: No joint pain or swelling; No muscle, back, or extremity pain  PSYCHIATRIC: No depression, anxiety, mood swings, or difficulty sleeping  HEME/LYMPH: No easy bruising, or bleeding gums  ALLERY AND IMMUNOLOGIC: No hives or eczema      OBJECTIVE:  ICU Vital Signs Last 24 Hrs  T(C): 36.1 (22 Jan 2019 14:32), Max: 37.1 (21 Jan 2019 22:32)  T(F): 97 (22 Jan 2019 14:32), Max: 98.8 (21 Jan 2019 22:32)  HR: 97 (22 Jan 2019 14:32) (91 - 112)  BP: 120/61 (22 Jan 2019 14:32) (111/50 - 138/77)  BP(mean): --  ABP: --  ABP(mean): --  RR: 18 (22 Jan 2019 14:32) (18 - 19)  SpO2: 93% (22 Jan 2019 14:32) (90% - 95%)        CAPILLARY BLOOD GLUCOSE          PHYSICAL EXAM:  GENERAL: NAD, well-groomed, well-developed  HEAD:  Atraumatic, Normocephalic  EYES: EOMI, PERRLA, conjunctiva and sclera clear  ENMT: No tonsillar erythema, exudates, or enlargement; Moist mucous membranes, Good dentition, No lesions  NECK: Supple, No JVD, Normal thyroid  NERVOUS SYSTEM:  Alert & Oriented X3, Good concentration; Motor Strength 5/5 B/L upper and lower extremities; DTRs 2+ intact and symmetric  CHEST/LUNG: Clear to percussion bilaterally; No rales, rhonchi, wheezing, or rubs  HEART: Regular rate and rhythm; No murmurs, rubs, or gallops  ABDOMEN: Soft, Nontender, Nondistended; Bowel sounds present  EXTREMITIES:  2+ Peripheral Pulses, No clubbing, cyanosis, or edema  LYMPH: No lymphadenopathy noted  SKIN: No rashes or lesions    HOSPITAL MEDICATIONS:  Standing Meds:  aspirin enteric coated 81 milliGRAM(s) Oral daily  diltiazem    milliGRAM(s) Oral daily  enoxaparin Injectable 40 milliGRAM(s) SubCutaneous every 24 hours  influenza   Vaccine 0.5 milliLiter(s) IntraMuscular once  methylPREDNISolone sodium succinate Injectable 40 milliGRAM(s) IV Push every 8 hours  thyroid 60 milliGRAM(s) Oral daily      PRN Meds:  levalbuterol Inhalation 0.63 milliGRAM(s) Inhalation every 6 hours PRN      LABS:                        15.8   7.07  )-----------( 279      ( 22 Jan 2019 05:00 )             49.3     Hgb Trend: 15.8<--, 15.4<--  01-22    136  |  103  |  21  ----------------------------<  166<H>  5.0   |  22  |  1.07    Ca    9.7      22 Jan 2019 05:00  Mg     2.2     01-22    TPro  7.3  /  Alb  3.9  /  TBili  0.5  /  DBili  x   /  AST  12  /  ALT  16  /  AlkPhos  102  01-21    Creatinine Trend: 1.07<--, 0.86<--  PT/INR - ( 22 Jan 2019 00:40 )   PT: 12.7 SEC;   INR: 1.11          PTT - ( 22 Jan 2019 00:40 )  PTT:35.5 SEC      Venous Blood Gas:  01-22 @ 00:40  7.43/35/61/24/91.5  VBG Lactate: 2.5  Venous Blood Gas:  01-21 @ 13:30  7.39/43/57/25/88.7  VBG Lactate: 1.2      MICROBIOLOGY:       RADIOLOGY:  [ ] Reviewed and interpreted by me    PULMONARY FUNCTION TESTS:    EKG: Pulmonary Consult Note:    CHIEF COMPLAINT: SOB    HPI:  59 y/o male with a PMHx of atrial fibrillation not on A/C, hypothyroidism and asthma (prior hospitalizations but never intubated) who presented to ED with dyspnea on exertion/wheezing for two weeks associated with cold weather, wheezing when laying flat, dry cough and admitted for asthma exacerbation. Pulmonary consulted for asthma exacerbation with persistent hypoxemia despite multiple nebulizer treatments and steroids.      Denies fever, chills, recent travel, headache, dizziness, visual deficits, chest pain, palpitations, abdominal pain, N/V/D/C, hematochezia, melena, dysuria, hematuria, LOC, syncope, peripheral edema.        PAST MEDICAL & SURGICAL HISTORY:  AF (atrial fibrillation)  Hypothyroidism  Asthma  S/P arthroscopic surgery of right knee: Meniscus repair    FAMILY HISTORY:  No pertinent family history in first degree relatives    SOCIAL HISTORY:  . 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    Allergies  No Known Allergies    HOME MEDICATIONS:  Home Medications:  [place] Thyroid 60 mg oral tablet: 1 tab(s) orally once a day (04 Sep 2017 21:55)  aspirin 81 mg oral tablet: 1 tab(s) orally once a day (21 Jan 2019 15:44)  Ventolin HFA 90 mcg/inh inhalation aerosol: 2 puff(s) inhaled 4 times a day, As Needed (21 Jan 2019 15:44)    REVIEW OF SYSTEMS:  CONSTITUTIONAL: No fever, weight loss, or fatigue  EYES: No eye pain, visual disturbances, or discharge  ENMT:  No difficulty hearing, tinnitus, vertigo; No sinus or throat pain  NECK: No pain or stiffness  BREASTS: No pain, masses, or nipple discharge  RESPIRATORY: + dry cough, wheezing,+shortness of breath  CARDIOVASCULAR: No chest pain, palpitations, dizziness, or leg swelling  GASTROINTESTINAL: No abdominal or epigastric pain. No nausea, vomiting, or hematemesis; No diarrhea or constipation. No melena or hematochezia.  GENITOURINARY: No dysuria, frequency, hematuria, or incontinence  NEUROLOGICAL: No headaches, memory loss, loss of strength, numbness, or tremors  SKIN: No itching, burning, rashes, or lesions   LYMPH NODES: No enlarged glands  ENDOCRINE: No heat or cold intolerance; No hair loss  MUSCULOSKELETAL: No joint pain or swelling; No muscle, back, or extremity pain  PSYCHIATRIC: No depression, anxiety, mood swings, or difficulty sleeping  HEME/LYMPH: No easy bruising, or bleeding gums  ALLERY AND IMMUNOLOGIC: No hives or eczema    OBJECTIVE:  ICU Vital Signs Last 24 Hrs  T(C): 36.1 (22 Jan 2019 14:32), Max: 37.1 (21 Jan 2019 22:32)  T(F): 97 (22 Jan 2019 14:32), Max: 98.8 (21 Jan 2019 22:32)  HR: 97 (22 Jan 2019 14:32) (91 - 112)  BP: 120/61 (22 Jan 2019 14:32) (111/50 - 138/77)  RR: 18 (22 Jan 2019 14:32) (18 - 19)  SpO2: 93% (22 Jan 2019 14:32) (90% - 95%)    CAPILLARY BLOOD GLUCOSE    PHYSICAL EXAM:  GENERAL: NAD, well-groomed, well-developed  HEAD:  Atraumatic, Normocephalic  EYES: EOMI, conjunctiva and sclera clear  NERVOUS SYSTEM:  Alert & Oriented X3, Good concentration; Moving all extremities.   CHEST/LUNG: Decreased air movement scattered rhonchi.   HEART: Regular rate and rhythm; No murmurs, rubs, or gallops  ABDOMEN: Soft, Nontender, Nondistended; Bowel sounds present  EXTREMITIES:  2+ Peripheral Pulses, No clubbing, cyanosis, or edema  LYMPH: No lymphadenopathy noted  SKIN: No rashes or lesions    HOSPITAL MEDICATIONS:  Standing Meds:  aspirin enteric coated 81 milliGRAM(s) Oral daily  diltiazem    milliGRAM(s) Oral daily  enoxaparin Injectable 40 milliGRAM(s) SubCutaneous every 24 hours  influenza   Vaccine 0.5 milliLiter(s) IntraMuscular once  methylPREDNISolone sodium succinate Injectable 40 milliGRAM(s) IV Push every 8 hours  thyroid 60 milliGRAM(s) Oral daily      PRN Meds:  levalbuterol Inhalation 0.63 milliGRAM(s) Inhalation every 6 hours PRN      LABS:                        15.8   7.07  )-----------( 279      ( 22 Jan 2019 05:00 )             49.3     Hgb Trend: 15.8<--, 15.4<--  01-22    136  |  103  |  21  ----------------------------<  166<H>  5.0   |  22  |  1.07    Ca    9.7      22 Jan 2019 05:00  Mg     2.2     01-22    TPro  7.3  /  Alb  3.9  /  TBili  0.5  /  DBili  x   /  AST  12  /  ALT  16  /  AlkPhos  102  01-21    Creatinine Trend: 1.07<--, 0.86<--  PT/INR - ( 22 Jan 2019 00:40 )   PT: 12.7 SEC;   INR: 1.11     PTT - ( 22 Jan 2019 00:40 )  PTT:35.5 SEC    Venous Blood Gas:  01-22 @ 00:40  7.43/35/61/24/91.5  VBG Lactate: 2.5  Venous Blood Gas:  01-21 @ 13:30  7.39/43/57/25/88.7  VBG Lactate: 1.2    RADIOLOGY:  [x] Reviewed and interpreted by me

## 2019-01-22 NOTE — CONSULT NOTE ADULT - ASSESSMENT
57 y/o male with a PMHx of atrial fibrillation not on A/C, hypothyroidism and asthma (prior hospitalizations but never intubated) who presented to ED with dyspnea on exertion/wheezing for two weeks associated with cold weather, wheezing when laying flat, dry cough and admitted for asthma exacerbation. Pulmonary consulted for asthma exacerbation with persistent hypoxemia despite multiple nebulizer treatments and steroids.      Asthma exacerbation.  CXR clear, D dDimer negative, RVP negative. Afebrile. No leukocytosis. No s/s of pneumonia. TTE study poor. Will need some time for lungs to improve from exacerbation and wean off supplemental O2.   - continue bronchodilator nebulizers  - Continue Steroids, suggest decreasing to 40mg daily and completing a 5 day burst.   - Supplemental O2 as needed to maintain O2 sat >92% <96%    Gladys Pierson MD  Pulmonary & Critical Care Medicine Fellow | PGY-4  [phone] 59 y/o male with a PMHx of atrial fibrillation not on A/C, hypothyroidism and asthma (prior hospitalizations but never intubated) who presented to ED with dyspnea on exertion/wheezing for two weeks associated with cold weather, wheezing when laying flat, dry cough and admitted for asthma exacerbation. Pulmonary consulted for asthma exacerbation with persistent hypoxemia despite multiple nebulizer treatments and steroids.      Asthma exacerbation.  CXR clear, D dDimer negative, RVP negative. Afebrile. No leukocytosis. No s/s of pneumonia. TTE study poor. Will need some time for lungs to improve from exacerbation and wean off supplemental O2.   - continue bronchodilator nebulizers  - Continue Steroids, suggest decreasing to 40mg daily and completing a 5 day burst.   - Supplemental O2 as needed to maintain O2 sat >92% <96%  - Needs outpatient pulmonary follow up upon discharge at [address] [place] [address], [address] ([phone]).    - Please e-mail [email or web address] to make an appointment for the patient.    - Please include the name, , and a phone number for you and the patient.    Gladys Pierson MD  Pulmonary & Critical Care Medicine Fellow | PGY-4  [phone] 59 y/o male with a PMHx of atrial fibrillation not on A/C, hypothyroidism and asthma (prior hospitalizations but never intubated) who presented to ED with dyspnea on exertion/wheezing for two weeks associated with cold weather, wheezing when laying flat, dry cough and admitted for asthma exacerbation. Pulmonary consulted for asthma exacerbation with persistent hypoxemia despite multiple nebulizer treatments and steroids.      Asthma exacerbation.  CXR clear, D dDimer negative, RVP negative. Afebrile. No leukocytosis. No s/s of pneumonia. TTE study poor. Will need some time for lungs to improve from exacerbation and wean off supplemental O2.   - continue bronchodilator nebulizers  - Continue Steroids, suggest decreasing to 40mg daily and completing a 5 day burst.   - Recommend Starting on Symbicort BID  - Supplemental O2 as needed to maintain O2 sat >92% <96%  - Needs outpatient pulmonary follow up (Dr. Robins patient) upon discharge at [address] [place] [address], [address] ([phone]).    - Please e-mail [email or web address] to make an appointment for the patient.    - Please include the name, , and a phone number for you and the patient.    Gladys Pierson MD  Pulmonary & Critical Care Medicine Fellow | PGY-4  [phone]

## 2019-01-23 LAB
ANION GAP SERPL CALC-SCNC: 14 MMO/L — SIGNIFICANT CHANGE UP (ref 7–14)
BUN SERPL-MCNC: 34 MG/DL — HIGH (ref 7–23)
CALCIUM SERPL-MCNC: 9 MG/DL — SIGNIFICANT CHANGE UP (ref 8.4–10.5)
CHLORIDE SERPL-SCNC: 101 MMOL/L — SIGNIFICANT CHANGE UP (ref 98–107)
CO2 SERPL-SCNC: 22 MMOL/L — SIGNIFICANT CHANGE UP (ref 22–31)
CREAT SERPL-MCNC: 1.05 MG/DL — SIGNIFICANT CHANGE UP (ref 0.5–1.3)
GLUCOSE SERPL-MCNC: 141 MG/DL — HIGH (ref 70–99)
HCT VFR BLD CALC: 47.9 % — SIGNIFICANT CHANGE UP (ref 39–50)
HGB BLD-MCNC: 15.3 G/DL — SIGNIFICANT CHANGE UP (ref 13–17)
MAGNESIUM SERPL-MCNC: 2.3 MG/DL — SIGNIFICANT CHANGE UP (ref 1.6–2.6)
MCHC RBC-ENTMCNC: 28.2 PG — SIGNIFICANT CHANGE UP (ref 27–34)
MCHC RBC-ENTMCNC: 31.9 % — LOW (ref 32–36)
MCV RBC AUTO: 88.4 FL — SIGNIFICANT CHANGE UP (ref 80–100)
NRBC # FLD: 0 K/UL — LOW (ref 25–125)
PLATELET # BLD AUTO: 299 K/UL — SIGNIFICANT CHANGE UP (ref 150–400)
PMV BLD: 11.1 FL — SIGNIFICANT CHANGE UP (ref 7–13)
POTASSIUM SERPL-MCNC: 4.7 MMOL/L — SIGNIFICANT CHANGE UP (ref 3.5–5.3)
POTASSIUM SERPL-SCNC: 4.7 MMOL/L — SIGNIFICANT CHANGE UP (ref 3.5–5.3)
RBC # BLD: 5.42 M/UL — SIGNIFICANT CHANGE UP (ref 4.2–5.8)
RBC # FLD: 14.9 % — HIGH (ref 10.3–14.5)
SODIUM SERPL-SCNC: 137 MMOL/L — SIGNIFICANT CHANGE UP (ref 135–145)
WBC # BLD: 11.57 K/UL — HIGH (ref 3.8–10.5)
WBC # FLD AUTO: 11.57 K/UL — HIGH (ref 3.8–10.5)

## 2019-01-23 RX ADMIN — LEVALBUTEROL 0.63 MILLIGRAM(S): 1.25 SOLUTION, CONCENTRATE RESPIRATORY (INHALATION) at 02:25

## 2019-01-23 RX ADMIN — Medication 81 MILLIGRAM(S): at 11:18

## 2019-01-23 RX ADMIN — LEVALBUTEROL 0.63 MILLIGRAM(S): 1.25 SOLUTION, CONCENTRATE RESPIRATORY (INHALATION) at 21:58

## 2019-01-23 RX ADMIN — Medication 40 MILLIGRAM(S): at 05:24

## 2019-01-23 RX ADMIN — Medication 60 MILLIGRAM(S): at 05:24

## 2019-01-23 RX ADMIN — Medication 180 MILLIGRAM(S): at 05:24

## 2019-01-23 RX ADMIN — ENOXAPARIN SODIUM 40 MILLIGRAM(S): 100 INJECTION SUBCUTANEOUS at 22:00

## 2019-01-24 LAB
ANION GAP SERPL CALC-SCNC: 10 MMO/L — SIGNIFICANT CHANGE UP (ref 7–14)
BUN SERPL-MCNC: 32 MG/DL — HIGH (ref 7–23)
CALCIUM SERPL-MCNC: 8.5 MG/DL — SIGNIFICANT CHANGE UP (ref 8.4–10.5)
CHLORIDE SERPL-SCNC: 104 MMOL/L — SIGNIFICANT CHANGE UP (ref 98–107)
CO2 SERPL-SCNC: 24 MMOL/L — SIGNIFICANT CHANGE UP (ref 22–31)
CREAT SERPL-MCNC: 1.03 MG/DL — SIGNIFICANT CHANGE UP (ref 0.5–1.3)
GLUCOSE SERPL-MCNC: 113 MG/DL — HIGH (ref 70–99)
HCT VFR BLD CALC: 45.6 % — SIGNIFICANT CHANGE UP (ref 39–50)
HGB BLD-MCNC: 14.4 G/DL — SIGNIFICANT CHANGE UP (ref 13–17)
MAGNESIUM SERPL-MCNC: 2.2 MG/DL — SIGNIFICANT CHANGE UP (ref 1.6–2.6)
MCHC RBC-ENTMCNC: 28.1 PG — SIGNIFICANT CHANGE UP (ref 27–34)
MCHC RBC-ENTMCNC: 31.6 % — LOW (ref 32–36)
MCV RBC AUTO: 89.1 FL — SIGNIFICANT CHANGE UP (ref 80–100)
NRBC # FLD: 0 K/UL — LOW (ref 25–125)
PLATELET # BLD AUTO: 274 K/UL — SIGNIFICANT CHANGE UP (ref 150–400)
PMV BLD: 10.9 FL — SIGNIFICANT CHANGE UP (ref 7–13)
POTASSIUM SERPL-MCNC: 4.4 MMOL/L — SIGNIFICANT CHANGE UP (ref 3.5–5.3)
POTASSIUM SERPL-SCNC: 4.4 MMOL/L — SIGNIFICANT CHANGE UP (ref 3.5–5.3)
RBC # BLD: 5.12 M/UL — SIGNIFICANT CHANGE UP (ref 4.2–5.8)
RBC # FLD: 14.7 % — HIGH (ref 10.3–14.5)
SODIUM SERPL-SCNC: 138 MMOL/L — SIGNIFICANT CHANGE UP (ref 135–145)
WBC # BLD: 9.55 K/UL — SIGNIFICANT CHANGE UP (ref 3.8–10.5)
WBC # FLD AUTO: 9.55 K/UL — SIGNIFICANT CHANGE UP (ref 3.8–10.5)

## 2019-01-24 PROCEDURE — 93458 L HRT ARTERY/VENTRICLE ANGIO: CPT | Mod: 26

## 2019-01-24 RX ORDER — BUDESONIDE AND FORMOTEROL FUMARATE DIHYDRATE 160; 4.5 UG/1; UG/1
2 AEROSOL RESPIRATORY (INHALATION)
Qty: 0 | Refills: 0 | Status: DISCONTINUED | OUTPATIENT
Start: 2019-01-24 | End: 2019-01-28

## 2019-01-24 RX ADMIN — BUDESONIDE AND FORMOTEROL FUMARATE DIHYDRATE 2 PUFF(S): 160; 4.5 AEROSOL RESPIRATORY (INHALATION) at 10:23

## 2019-01-24 RX ADMIN — BUDESONIDE AND FORMOTEROL FUMARATE DIHYDRATE 2 PUFF(S): 160; 4.5 AEROSOL RESPIRATORY (INHALATION) at 21:41

## 2019-01-24 RX ADMIN — Medication 81 MILLIGRAM(S): at 11:13

## 2019-01-24 RX ADMIN — Medication 180 MILLIGRAM(S): at 05:33

## 2019-01-24 RX ADMIN — ENOXAPARIN SODIUM 40 MILLIGRAM(S): 100 INJECTION SUBCUTANEOUS at 21:43

## 2019-01-24 RX ADMIN — Medication 40 MILLIGRAM(S): at 05:33

## 2019-01-24 RX ADMIN — Medication 60 MILLIGRAM(S): at 05:33

## 2019-01-24 NOTE — PROGRESS NOTE ADULT - SUBJECTIVE AND OBJECTIVE BOX
PRESENTING CC:Dyspnea    SUBJ: Feels better still refers to occaisonal chest tightness      PMH -reviewed admission note, no change since admission  Heart failure: acute [ ] chronic [ ] acute or chronic [ ] diastolic [ ] systolic [ ] combined systolic and diastolic[ ]  VERONICA: ATN[ ] renal medullary necrosis [ ] CKD I [ ]CKDII [ ]CKD III [ ]CKD IV [ ]CKD V [ ]Other pathological lesions [ ]    MEDICATIONS  (STANDING):  aspirin enteric coated 81 milliGRAM(s) Oral daily  buDESOnide 160 MICROgram(s)/formoterol 4.5 MICROgram(s) Inhaler 2 Puff(s) Inhalation two times a day  diltiazem    milliGRAM(s) Oral daily  enoxaparin Injectable 40 milliGRAM(s) SubCutaneous every 24 hours  influenza   Vaccine 0.5 milliLiter(s) IntraMuscular once  methylPREDNISolone sodium succinate Injectable 40 milliGRAM(s) IV Push daily  thyroid 60 milliGRAM(s) Oral daily    MEDICATIONS  (PRN):  levalbuterol Inhalation 0.63 milliGRAM(s) Inhalation every 6 hours PRN SOB/Wheezing          FAMILY HISTORY:  No pertinent family history in first degree relatives  No family history of premature coronary artery disease or sudden cardiac death            PHYSICAL EXAM:  General: No acute distress BMI-39  HEENT: EOMI, PERRL  Neck: Supple, [x ] JVD  Lungs: Equal air entry bilaterally; [ ] rales [ ] wheezing [x ] rhonchi  Heart: Regular rate and rhythm; [x ] murmur  2 /6 [x ] systolic [ ] diastolic [ ] radiation[ ] rubs [ ]  gallops  Abdomen: Nontender, bowel sounds present  Extremities: No clubbing, cyanosis, [x ] edema  Nervous system:  Alert & Oriented X3, no focal deficits  Psychiatric: Normal affect  Skin: No rashes or lesions    LABS:  01-23    137  |  101  |  34<H>  ----------------------------<  141<H>  4.7   |  22  |  1.05    Ca    9.0      23 Jan 2019 06:52  Mg     2.3     01-23      Creatinine Trend: 1.05<--, 1.07<--, 0.86<--                        15.3   11.57 )-----------( 299      ( 23 Jan 2019 06:52 )             47.9       ECHO: Study Date: 1/22/2019Endocardium not well visualized; unable to evaluate left ventricular systolic function.      STRESS TEST:-STUDY DATE: 01/15/2018  There is a small, fixed,  mild intensity defect in the apical inferolateral wall that thickens normally, consistent with attenuation artifact.  * Post-stress resting myocardial perfusion gated SPECT imaging was performed (LVEF = 70 %)  * Negative study for reversible ischemia      IMPRESSION AND PLAN:      Problem/Plan - 1:  ·  Problem: Asthma exacerbation.  Plan: Pt with diffuse expiratory wheezing on physical exam consistent with asthma exacerbation  Continue with Xopenex q6hrs PRN given mild tachycardia  Start Solumedrol 40mg IVP q8hrs  Continue with supplemental oxygen as needed.  Pulmonary consult noted  Ischemic iliw-wo-Vdoi in AM     Problem/Plan - 2:  ·  Problem: AF (atrial fibrillation).  Plan: Monitor on telemetry given HR mildly elevated  Initial trop 12, await delta troponin  Increase Cardizem from 120mg to 180mg daily for better rate control  Avoid duonebs  Continue ASA 81mg daily for A/C given CHADS score 0  Echocardiogram ordered to assess LV and valvular function.     Problem/Plan - 3:  ·  Problem: Hypothyroidism.  Plan: Continue [place] thyroid at current dose  Check TSH levels.     Problem/Plan - 4:  ·  Problem: Need for prophylactic measure.  Plan: Lovenox 40mg SQ daily.
CHIEF COMPLAINT: SOB    HPI:-59 y/o male with a PMHx of atrial fibrillation not on A/C, hypothyroidism and asthma (prior hospitalizations but never intubated) who presented to ED with dyspnea on exertion/wheezing for two weeks associated with cold weather, wheezing when laying flat, dry cough and admitted for asthma exacerbation. Pulmonary consulted for asthma exacerbation with persistent hypoxemia despite multiple nebulizer treatments and steroids.      Denies fever, chills, recent travel, headache, dizziness, visual deficits, chest pain, palpitations, abdominal pain, N/V/D/C, hematochezia, melena, dysuria, hematuria, LOC, syncope, peripheral edema.        PAST MEDICAL & SURGICAL HISTORY:  AF (atrial fibrillation)  Hypothyroidism  Asthma  S/P arthroscopic surgery of right knee: Meniscus repair    FAMILY HISTORY:  No pertinent family history in first degree relatives    SOCIAL HISTORY:  . 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    Allergies  No Known Allergies    HOME MEDICATIONS:  Home Medications:  [place] Thyroid 60 mg oral tablet: 1 tab(s) orally once a day (04 Sep 2017 21:55)  aspirin 81 mg oral tablet: 1 tab(s) orally once a day (21 Jan 2019 15:44)  Ventolin HFA 90 mcg/inh inhalation aerosol: 2 puff(s) inhaled 4 times a day, As Needed (21 Jan 2019 15:44)      OBJECTIVE:  ICU Vital Signs Last 24 Hrs  T(C): 36.1 (22 Jan 2019 14:32), Max: 37.1 (21 Jan 2019 22:32)  T(F): 97 (22 Jan 2019 14:32), Max: 98.8 (21 Jan 2019 22:32)  HR: 97 (22 Jan 2019 14:32) (91 - 112)  BP: 120/61 (22 Jan 2019 14:32) (111/50 - 138/77)  RR: 18 (22 Jan 2019 14:32) (18 - 19)  SpO2: 93% (22 Jan 2019 14:32) (90% - 95%)    CAPILLARY BLOOD GLUCOSE    PHYSICAL EXAM:  GENERAL: NAD, well-groomed, well-developed  HEAD:  Atraumatic, Normocephalic  EYES: EOMI, conjunctiva and sclera clear  NERVOUS SYSTEM:  Alert & Oriented X3, Good concentration; Moving all extremities.   CHEST/LUNG: Decreased air movement scattered rhonchi.   HEART: Regular rate and rhythm; No murmurs, rubs, or gallops  ABDOMEN: Soft, Nontender, Nondistended; Bowel sounds present  EXTREMITIES:  2+ Peripheral Pulses, No clubbing, cyanosis, or edema  LYMPH: No lymphadenopathy noted  SKIN: No rashes or lesions    HOSPITAL MEDICATIONS:  Standing Meds:  aspirin enteric coated 81 milliGRAM(s) Oral daily  diltiazem    milliGRAM(s) Oral daily  enoxaparin Injectable 40 milliGRAM(s) SubCutaneous every 24 hours  influenza   Vaccine 0.5 milliLiter(s) IntraMuscular once  methylPREDNISolone sodium succinate Injectable 40 milliGRAM(s) IV Push every 8 hours  thyroid 60 milliGRAM(s) Oral daily      PRN Meds:  levalbuterol Inhalation 0.63 milliGRAM(s) Inhalation every 6 hours PRN      LABS:                        15.8   7.07  )-----------( 279      ( 22 Jan 2019 05:00 )             49.3     Hgb Trend: 15.8<--, 15.4<--  01-22    136  |  103  |  21  ----------------------------<  166<H>  5.0   |  22  |  1.07    Ca    9.7      22 Jan 2019 05:00  Mg     2.2     01-22    TPro  7.3  /  Alb  3.9  /  TBili  0.5  /  DBili  x   /  AST  12  /  ALT  16  /  AlkPhos  102  01-21    Creatinine Trend: 1.07<--, 0.86<--  PT/INR - ( 22 Jan 2019 00:40 )   PT: 12.7 SEC;   INR: 1.11     PTT - ( 22 Jan 2019 00:40 )  PTT:35.5 SEC    Venous Blood Gas:  01-22 @ 00:40  7.43/35/61/24/91.5  VBG Lactate: 2.5  Venous Blood Gas:  01-21 @ 13:30  7.39/43/57/25/88.7  VBG Lactate: 1.2

## 2019-01-24 NOTE — CHART NOTE - NSCHARTNOTEFT_GEN_A_CORE
MAGALYS RUTLEDGE  MRN-[id number]    Patient status post cath via right radial artery. Dressing clean/dry/intact. Without hematoma. Pulses present, capillary refill appropriate. Without hematoma. Will continue to follow closely.

## 2019-01-24 NOTE — PROGRESS NOTE ADULT - ATTENDING COMMENTS
Patient was seen and examined,interim events noted,labs and radiology studies reviewed.  Rui De Jesus MD,FACC.  [address] [place] [address].  [id number].  [phone]
Patient was seen and examined,interim events noted,labs and radiology studies reviewed.  Rui De Jesus MD,FACC.  [address] [place] [address].  [facility].  [phone]

## 2019-01-25 ENCOUNTER — TRANSCRIPTION ENCOUNTER (OUTPATIENT)
Age: 59
End: 2019-01-25

## 2019-01-25 LAB
ANION GAP SERPL CALC-SCNC: 10 MMO/L — SIGNIFICANT CHANGE UP (ref 7–14)
BASOPHILS # BLD AUTO: 0 K/UL — SIGNIFICANT CHANGE UP (ref 0–0.2)
BASOPHILS NFR BLD AUTO: 0 % — SIGNIFICANT CHANGE UP (ref 0–2)
BUN SERPL-MCNC: 28 MG/DL — HIGH (ref 7–23)
CALCIUM SERPL-MCNC: 8.4 MG/DL — SIGNIFICANT CHANGE UP (ref 8.4–10.5)
CHLORIDE SERPL-SCNC: 104 MMOL/L — SIGNIFICANT CHANGE UP (ref 98–107)
CO2 SERPL-SCNC: 24 MMOL/L — SIGNIFICANT CHANGE UP (ref 22–31)
CREAT SERPL-MCNC: 1 MG/DL — SIGNIFICANT CHANGE UP (ref 0.5–1.3)
EOSINOPHIL # BLD AUTO: 0 K/UL — SIGNIFICANT CHANGE UP (ref 0–0.5)
EOSINOPHIL NFR BLD AUTO: 0 % — SIGNIFICANT CHANGE UP (ref 0–6)
GLUCOSE SERPL-MCNC: 109 MG/DL — HIGH (ref 70–99)
HCT VFR BLD CALC: 48.3 % — SIGNIFICANT CHANGE UP (ref 39–50)
HGB BLD-MCNC: 15.2 G/DL — SIGNIFICANT CHANGE UP (ref 13–17)
IMM GRANULOCYTES NFR BLD AUTO: 0.6 % — SIGNIFICANT CHANGE UP (ref 0–1.5)
LYMPHOCYTES # BLD AUTO: 1.02 K/UL — SIGNIFICANT CHANGE UP (ref 1–3.3)
LYMPHOCYTES # BLD AUTO: 15 % — SIGNIFICANT CHANGE UP (ref 13–44)
MAGNESIUM SERPL-MCNC: 2.3 MG/DL — SIGNIFICANT CHANGE UP (ref 1.6–2.6)
MCHC RBC-ENTMCNC: 28 PG — SIGNIFICANT CHANGE UP (ref 27–34)
MCHC RBC-ENTMCNC: 31.5 % — LOW (ref 32–36)
MCV RBC AUTO: 89 FL — SIGNIFICANT CHANGE UP (ref 80–100)
MONOCYTES # BLD AUTO: 0.39 K/UL — SIGNIFICANT CHANGE UP (ref 0–0.9)
MONOCYTES NFR BLD AUTO: 5.7 % — SIGNIFICANT CHANGE UP (ref 2–14)
NEUTROPHILS # BLD AUTO: 5.34 K/UL — SIGNIFICANT CHANGE UP (ref 1.8–7.4)
NEUTROPHILS NFR BLD AUTO: 78.7 % — HIGH (ref 43–77)
NRBC # FLD: 0 K/UL — LOW (ref 25–125)
PLATELET # BLD AUTO: 269 K/UL — SIGNIFICANT CHANGE UP (ref 150–400)
PMV BLD: 11 FL — SIGNIFICANT CHANGE UP (ref 7–13)
POTASSIUM SERPL-MCNC: 4.5 MMOL/L — SIGNIFICANT CHANGE UP (ref 3.5–5.3)
POTASSIUM SERPL-SCNC: 4.5 MMOL/L — SIGNIFICANT CHANGE UP (ref 3.5–5.3)
RBC # BLD: 5.43 M/UL — SIGNIFICANT CHANGE UP (ref 4.2–5.8)
RBC # FLD: 14.5 % — SIGNIFICANT CHANGE UP (ref 10.3–14.5)
SODIUM SERPL-SCNC: 138 MMOL/L — SIGNIFICANT CHANGE UP (ref 135–145)
WBC # BLD: 6.79 K/UL — SIGNIFICANT CHANGE UP (ref 3.8–10.5)
WBC # FLD AUTO: 6.79 K/UL — SIGNIFICANT CHANGE UP (ref 3.8–10.5)

## 2019-01-25 RX ADMIN — BUDESONIDE AND FORMOTEROL FUMARATE DIHYDRATE 2 PUFF(S): 160; 4.5 AEROSOL RESPIRATORY (INHALATION) at 21:40

## 2019-01-25 RX ADMIN — Medication 60 MILLIGRAM(S): at 05:43

## 2019-01-25 RX ADMIN — ENOXAPARIN SODIUM 40 MILLIGRAM(S): 100 INJECTION SUBCUTANEOUS at 21:41

## 2019-01-25 RX ADMIN — Medication 180 MILLIGRAM(S): at 05:43

## 2019-01-25 RX ADMIN — Medication 40 MILLIGRAM(S): at 11:22

## 2019-01-25 RX ADMIN — BUDESONIDE AND FORMOTEROL FUMARATE DIHYDRATE 2 PUFF(S): 160; 4.5 AEROSOL RESPIRATORY (INHALATION) at 11:22

## 2019-01-25 RX ADMIN — Medication 40 MILLIGRAM(S): at 05:43

## 2019-01-25 RX ADMIN — Medication 81 MILLIGRAM(S): at 11:21

## 2019-01-26 LAB
ANION GAP SERPL CALC-SCNC: 10 MMO/L — SIGNIFICANT CHANGE UP (ref 7–14)
BUN SERPL-MCNC: 24 MG/DL — HIGH (ref 7–23)
CALCIUM SERPL-MCNC: 8.5 MG/DL — SIGNIFICANT CHANGE UP (ref 8.4–10.5)
CHLORIDE SERPL-SCNC: 100 MMOL/L — SIGNIFICANT CHANGE UP (ref 98–107)
CO2 SERPL-SCNC: 26 MMOL/L — SIGNIFICANT CHANGE UP (ref 22–31)
CREAT SERPL-MCNC: 0.94 MG/DL — SIGNIFICANT CHANGE UP (ref 0.5–1.3)
GLUCOSE SERPL-MCNC: 105 MG/DL — HIGH (ref 70–99)
HCT VFR BLD CALC: 51 % — HIGH (ref 39–50)
HGB BLD-MCNC: 16.1 G/DL — SIGNIFICANT CHANGE UP (ref 13–17)
MAGNESIUM SERPL-MCNC: 2.4 MG/DL — SIGNIFICANT CHANGE UP (ref 1.6–2.6)
MCHC RBC-ENTMCNC: 28 PG — SIGNIFICANT CHANGE UP (ref 27–34)
MCHC RBC-ENTMCNC: 31.6 % — LOW (ref 32–36)
MCV RBC AUTO: 88.5 FL — SIGNIFICANT CHANGE UP (ref 80–100)
NRBC # FLD: 0 K/UL — LOW (ref 25–125)
PLATELET # BLD AUTO: 267 K/UL — SIGNIFICANT CHANGE UP (ref 150–400)
PMV BLD: 10.8 FL — SIGNIFICANT CHANGE UP (ref 7–13)
POTASSIUM SERPL-MCNC: 4.6 MMOL/L — SIGNIFICANT CHANGE UP (ref 3.5–5.3)
POTASSIUM SERPL-SCNC: 4.6 MMOL/L — SIGNIFICANT CHANGE UP (ref 3.5–5.3)
RBC # BLD: 5.76 M/UL — SIGNIFICANT CHANGE UP (ref 4.2–5.8)
RBC # FLD: 14.1 % — SIGNIFICANT CHANGE UP (ref 10.3–14.5)
SODIUM SERPL-SCNC: 136 MMOL/L — SIGNIFICANT CHANGE UP (ref 135–145)
WBC # BLD: 7.51 K/UL — SIGNIFICANT CHANGE UP (ref 3.8–10.5)
WBC # FLD AUTO: 7.51 K/UL — SIGNIFICANT CHANGE UP (ref 3.8–10.5)

## 2019-01-26 RX ADMIN — Medication 81 MILLIGRAM(S): at 12:20

## 2019-01-26 RX ADMIN — ENOXAPARIN SODIUM 40 MILLIGRAM(S): 100 INJECTION SUBCUTANEOUS at 21:30

## 2019-01-26 RX ADMIN — Medication 60 MILLIGRAM(S): at 05:50

## 2019-01-26 RX ADMIN — BUDESONIDE AND FORMOTEROL FUMARATE DIHYDRATE 2 PUFF(S): 160; 4.5 AEROSOL RESPIRATORY (INHALATION) at 12:20

## 2019-01-26 RX ADMIN — Medication 40 MILLIGRAM(S): at 05:51

## 2019-01-26 RX ADMIN — Medication 180 MILLIGRAM(S): at 05:50

## 2019-01-26 RX ADMIN — BUDESONIDE AND FORMOTEROL FUMARATE DIHYDRATE 2 PUFF(S): 160; 4.5 AEROSOL RESPIRATORY (INHALATION) at 23:16

## 2019-01-26 NOTE — DISCHARGE NOTE ADULT - MEDICATION SUMMARY - MEDICATIONS TO CHANGE
I will SWITCH the dose or number of times a day I take the medications listed below when I get home from the hospital:    dilTIAZem 120 mg/24 hours oral capsule, extended release  -- 1 cap(s) by mouth once a day

## 2019-01-26 NOTE — DISCHARGE NOTE ADULT - PATIENT PORTAL LINK FT
You can access the [facility] Patient Portal, offered by [facility], by registering with the following website: [email or web address]

## 2019-01-26 NOTE — DISCHARGE NOTE ADULT - CARE PROVIDER_API CALL
David Robins), Medicine  Pulmonary Medicine  [address] [place] [address]  Phone: [phone]  Fax: [phone]    Rui De Jesus), Medicine  Dept Director  [address] [place], [place] [address]  Phone: [phone]  Fax: [phone]

## 2019-01-26 NOTE — DISCHARGE NOTE ADULT - CARE PLAN
Principal Discharge DX:	AF (atrial fibrillation)  Goal:	To restore or maintain a normal heart rate and rhythm, to prevent blood clots, and decrease the risks of stroke.  Assessment and plan of treatment:	You were started on medications to control your heart rate. Take your aspirin daily to decrease the risk of stroke. Avoid triggers for atrial fibrillation such as alcohol, caffeine, and nicotine.  Secondary Diagnosis:	Asthma exacerbation  Goal:	To prevent long-term (chronic) symptoms that interfere with daily living, such as coughing, wheezing or shortness of breath during the night or after exercise.  To be able to participate in all activities of daily living, including work, school, and exercise.  To maintain near normal pulmonary function test and prevent asthma exacerbation.  Assessment and plan of treatment:	Continue to take your inhalers as prescribed. You are to complete a 5 day course of steroids that was started in the hospital. Follow up with your pulmonologist after discharge.  Secondary Diagnosis:	Hypothyroidism  Goal:	To maintain a normal TSH Level.  Assessment and plan of treatment:	Take your thyroid medications as prescribed. Follow up with your primary care provider for routine thyroid level monitoring. Principal Discharge DX:	AF (atrial fibrillation)  Goal:	To restore or maintain a normal heart rate and rhythm, to prevent blood clots, and decrease the risks of stroke.  Assessment and plan of treatment:	You were started on medications to control your heart rate. Take your aspirin daily to decrease the risk of stroke. Avoid triggers for atrial fibrillation such as alcohol, caffeine, and nicotine. Follow up with your cardiologist in 2 weeks  Secondary Diagnosis:	Asthma exacerbation  Goal:	To prevent long-term (chronic) symptoms that interfere with daily living, such as coughing, wheezing or shortness of breath during the night or after exercise.  To be able to participate in all activities of daily living, including work, school, and exercise.  To maintain near normal pulmonary function test and prevent asthma exacerbation.  Assessment and plan of treatment:	Continue to take your inhalers as prescribed. You are to complete a 5 day course of steroids that was started in the hospital. Follow up with your pulmonologist after discharge 2 weeks  Secondary Diagnosis:	Hypothyroidism  Goal:	To maintain a normal TSH Level.  Assessment and plan of treatment:	Take your thyroid medications as prescribed. Follow up with your primary care provider for routine thyroid level monitoring. Principal Discharge DX:	AF (atrial fibrillation)  Goal:	To restore or maintain a normal heart rate and rhythm, to prevent blood clots, and decrease the risks of stroke.  Assessment and plan of treatment:	You were started on medications to control your heart rate. Take your aspirin daily to decrease the risk of stroke. Avoid triggers for atrial fibrillation such as alcohol, caffeine, and nicotine. Follow up with your cardiologist in 2 weeks  Secondary Diagnosis:	Asthma exacerbation  Goal:	To prevent long-term (chronic) symptoms that interfere with daily living, such as coughing, wheezing or shortness of breath during the night or after exercise.  To be able to participate in all activities of daily living, including work, school, and exercise.  To maintain near normal pulmonary function test and prevent asthma exacerbation.  Assessment and plan of treatment:	Continue to take your inhalers as prescribed. You are to complete a 5 day course of steroids that was started in the hospital. Follow up with your pulmonologist after discharge 2 weeks. You may need to consider sleep study outpt  Secondary Diagnosis:	Hypothyroidism  Goal:	To maintain a normal TSH Level.  Assessment and plan of treatment:	Take your thyroid medications as prescribed. Follow up with your primary care provider for routine thyroid level monitoring.

## 2019-01-26 NOTE — DISCHARGE NOTE ADULT - MEDICATION SUMMARY - MEDICATIONS TO TAKE
I will START or STAY ON the medications listed below when I get home from the hospital:    predniSONE 20 mg oral tablet  -- 2 tab(s) by mouth once a day  -- Indication: For Asthma exacerbation    aspirin 81 mg oral tablet  -- 1 tab(s) by mouth once a day  -- Indication: For AF (atrial fibrillation)    dilTIAZem 180 mg/24 hours oral capsule, extended release  -- 1 cap(s) by mouth once a day  -- Indication: For AF (atrial fibrillation)    Ventolin HFA 90 mcg/inh inhalation aerosol  -- 2 puff(s) inhaled 4 times a day, As Needed  -- Indication: For Asthma exacerbation    budesonide-formoterol 160 mcg-4.5 mcg/inh inhalation aerosol  -- 2 puff(s) inhaled 2 times a day   -- Indication: For Asthma exacerbation    [place] Thyroid 60 mg oral tablet  -- 1 tab(s) by mouth once a day  -- Indication: For Hypothyroidism

## 2019-01-26 NOTE — DISCHARGE NOTE ADULT - PLAN OF CARE
To restore or maintain a normal heart rate and rhythm, to prevent blood clots, and decrease the risks of stroke. You were started on medications to control your heart rate. Take your aspirin daily to decrease the risk of stroke. Avoid triggers for atrial fibrillation such as alcohol, caffeine, and nicotine. To prevent long-term (chronic) symptoms that interfere with daily living, such as coughing, wheezing or shortness of breath during the night or after exercise.  To be able to participate in all activities of daily living, including work, school, and exercise.  To maintain near normal pulmonary function test and prevent asthma exacerbation. Continue to take your inhalers as prescribed. You are to complete a 5 day course of steroids that was started in the hospital. Follow up with your pulmonologist after discharge. To maintain a normal TSH Level. Take your thyroid medications as prescribed. Follow up with your primary care provider for routine thyroid level monitoring. You were started on medications to control your heart rate. Take your aspirin daily to decrease the risk of stroke. Avoid triggers for atrial fibrillation such as alcohol, caffeine, and nicotine. Follow up with your cardiologist in 2 weeks Continue to take your inhalers as prescribed. You are to complete a 5 day course of steroids that was started in the hospital. Follow up with your pulmonologist after discharge 2 weeks Continue to take your inhalers as prescribed. You are to complete a 5 day course of steroids that was started in the hospital. Follow up with your pulmonologist after discharge 2 weeks. You may need to consider sleep study outpt

## 2019-01-26 NOTE — DISCHARGE NOTE ADULT - HOSPITAL COURSE
HPI:  59 y/o male with a PMHx of atrial fibrillation not on A/C, hypothyroidism and asthma (prior hospitalizations but never intubated) presents to ED with dyspnea on exertion for the past two weeks. Pt reports worsening shortness of breath over the past couple weeks, which he affiliates to the cold weather. Pt reports that he usually has difficulty breathing in the winter because of the cold, and he actually wears a mask when he goes outside to prevent himself from wheezing. However, despite this, pt notices that he has been wheezing and coughing for the past couple of days. Pt endorses that his wheezing is worse when he is laying flat, which has caused him to sleep sitting upright for the past couple of nights. Pt also admits to a dry cough, with occasional clear sputum production. Pt feels like he is in an asthma exacerbation and has been using his Ventolin inhaler every 6 hours which has provided mild temporary relief. Pt denies fever, chills, recent travel, headache, dizziness, visual deficits, chest pain, palpitations, abdominal pain, N/V/D/C, hematochezia, melena, dysuria, hematuria, LOC, syncope, peripheral edema. Upon arrival to ED, EKG: Atrial fibrillation at 81 bpm. CE x1: Trop 12. ProBNP: 217.5. CXR: No focal consolidations. (21 Jan 2019 16:10)    Hospital Course  Patient was admitted to telemetry for Afib with RVR. A 2nd trop was 10. Pt had a TTE that was limited as is was deemed a technically very difficult study. 1. Mitral annular calcification, otherwise normal mitral valve. Minimal mitral regurgitation. 2. Endocardium not well visualized; unable to evaluate left ventricular systolic function. 3. Unable to accurately evaluate right ventricular size or  systolic function. A limited repeat imaging with IV contrast administration for improved evaluation of LV systolic function, was recommended if clinically indicated. Subsequently, a LHC was performed and showed NCA and left ventriculography showed an EF 50%. Pt was started on Cardizem and rate control was achieved and ASA was chosen as the anticoagulant.  Pt was also treated for asthma exacerbation. CXR was negative for PNA. D-dimer was negative, and RVP was also negative. Pt was seen and evaluated by pulmonary and started on a course of steroids, nebulizers, and Symbicort.   Pt's overall condition improved. Pt remained hemodynamically stable and deemed stable for discharge. HPI:  59 y/o male with a PMHx of atrial fibrillation not on A/C, hypothyroidism and asthma (prior hospitalizations but never intubated) presents to ED with dyspnea on exertion for the past two weeks. Pt reports worsening shortness of breath over the past couple weeks, which he affiliates to the cold weather. Pt reports that he usually has difficulty breathing in the winter because of the cold, and he actually wears a mask when he goes outside to prevent himself from wheezing. However, despite this, pt notices that he has been wheezing and coughing for the past couple of days. Pt endorses that his wheezing is worse when he is laying flat, which has caused him to sleep sitting upright for the past couple of nights. Pt also admits to a dry cough, with occasional clear sputum production. Pt feels like he is in an asthma exacerbation and has been using his Ventolin inhaler every 6 hours which has provided mild temporary relief. Pt denies fever, chills, recent travel, headache, dizziness, visual deficits, chest pain, palpitations, abdominal pain, N/V/D/C, hematochezia, melena, dysuria, hematuria, LOC, syncope, peripheral edema. Upon arrival to ED, EKG: Atrial fibrillation at 81 bpm. CE x1: Trop 12. ProBNP: 217.5. CXR: No focal consolidations. (21 Jan 2019 16:10)    Hospital Course  Patient was admitted to telemetry for Afib with RVR. A 2nd trop was 10. Pt had a TTE that was limited as is was deemed a technically very difficult study. 1. Mitral annular calcification, otherwise normal mitral valve. Minimal mitral regurgitation. 2. Endocardium not well visualized; unable to evaluate left ventricular systolic function. 3. Unable to accurately evaluate right ventricular size or  systolic function. A limited repeat imaging with IV contrast administration for improved evaluation of LV systolic function, was recommended if clinically indicated. Subsequently, a LHC was performed and showed NCA and left ventriculography showed an EF 50%. Pt was started on Cardizem and rate control was achieved and ASA was chosen as the anticoagulant.  Pt was also treated for asthma exacerbation. CXR was negative for PNA. D-dimer was negative, and RVP was also negative. Pt was seen and evaluated by pulmonary and started on a course of steroids, nebulizers, and Symbicort. Pt assessed for oxygen needs. Pt does not qualify for home oxygen   Pt's overall condition improved. Pt remained hemodynamically stable. As per Dr De Jesus pt cleared for discharge

## 2019-01-27 LAB
ANION GAP SERPL CALC-SCNC: 11 MMO/L — SIGNIFICANT CHANGE UP (ref 7–14)
BUN SERPL-MCNC: 29 MG/DL — HIGH (ref 7–23)
CALCIUM SERPL-MCNC: 8.2 MG/DL — LOW (ref 8.4–10.5)
CHLORIDE SERPL-SCNC: 101 MMOL/L — SIGNIFICANT CHANGE UP (ref 98–107)
CO2 SERPL-SCNC: 24 MMOL/L — SIGNIFICANT CHANGE UP (ref 22–31)
CREAT SERPL-MCNC: 1.09 MG/DL — SIGNIFICANT CHANGE UP (ref 0.5–1.3)
GLUCOSE SERPL-MCNC: 95 MG/DL — SIGNIFICANT CHANGE UP (ref 70–99)
HCT VFR BLD CALC: 49.5 % — SIGNIFICANT CHANGE UP (ref 39–50)
HGB BLD-MCNC: 15.9 G/DL — SIGNIFICANT CHANGE UP (ref 13–17)
MAGNESIUM SERPL-MCNC: 2.3 MG/DL — SIGNIFICANT CHANGE UP (ref 1.6–2.6)
MCHC RBC-ENTMCNC: 27.7 PG — SIGNIFICANT CHANGE UP (ref 27–34)
MCHC RBC-ENTMCNC: 32.1 % — SIGNIFICANT CHANGE UP (ref 32–36)
MCV RBC AUTO: 86.4 FL — SIGNIFICANT CHANGE UP (ref 80–100)
NRBC # FLD: 0 K/UL — LOW (ref 25–125)
PLATELET # BLD AUTO: 276 K/UL — SIGNIFICANT CHANGE UP (ref 150–400)
PMV BLD: 10.5 FL — SIGNIFICANT CHANGE UP (ref 7–13)
POTASSIUM SERPL-MCNC: 4.2 MMOL/L — SIGNIFICANT CHANGE UP (ref 3.5–5.3)
POTASSIUM SERPL-SCNC: 4.2 MMOL/L — SIGNIFICANT CHANGE UP (ref 3.5–5.3)
RBC # BLD: 5.73 M/UL — SIGNIFICANT CHANGE UP (ref 4.2–5.8)
RBC # FLD: 14.1 % — SIGNIFICANT CHANGE UP (ref 10.3–14.5)
SODIUM SERPL-SCNC: 136 MMOL/L — SIGNIFICANT CHANGE UP (ref 135–145)
WBC # BLD: 7.51 K/UL — SIGNIFICANT CHANGE UP (ref 3.8–10.5)
WBC # FLD AUTO: 7.51 K/UL — SIGNIFICANT CHANGE UP (ref 3.8–10.5)

## 2019-01-27 RX ADMIN — BUDESONIDE AND FORMOTEROL FUMARATE DIHYDRATE 2 PUFF(S): 160; 4.5 AEROSOL RESPIRATORY (INHALATION) at 12:32

## 2019-01-27 RX ADMIN — BUDESONIDE AND FORMOTEROL FUMARATE DIHYDRATE 2 PUFF(S): 160; 4.5 AEROSOL RESPIRATORY (INHALATION) at 21:22

## 2019-01-27 RX ADMIN — Medication 81 MILLIGRAM(S): at 12:32

## 2019-01-27 RX ADMIN — Medication 60 MILLIGRAM(S): at 05:13

## 2019-01-27 RX ADMIN — ENOXAPARIN SODIUM 40 MILLIGRAM(S): 100 INJECTION SUBCUTANEOUS at 21:23

## 2019-01-27 RX ADMIN — Medication 40 MILLIGRAM(S): at 05:13

## 2019-01-27 RX ADMIN — Medication 180 MILLIGRAM(S): at 05:13

## 2019-01-28 VITALS
DIASTOLIC BLOOD PRESSURE: 61 MMHG | TEMPERATURE: 98 F | SYSTOLIC BLOOD PRESSURE: 103 MMHG | HEART RATE: 81 BPM | RESPIRATION RATE: 20 BRPM | OXYGEN SATURATION: 92 %

## 2019-01-28 LAB
ANION GAP SERPL CALC-SCNC: 10 MMO/L — SIGNIFICANT CHANGE UP (ref 7–14)
BUN SERPL-MCNC: 23 MG/DL — SIGNIFICANT CHANGE UP (ref 7–23)
CALCIUM SERPL-MCNC: 8 MG/DL — LOW (ref 8.4–10.5)
CHLORIDE SERPL-SCNC: 102 MMOL/L — SIGNIFICANT CHANGE UP (ref 98–107)
CO2 SERPL-SCNC: 26 MMOL/L — SIGNIFICANT CHANGE UP (ref 22–31)
CREAT SERPL-MCNC: 0.97 MG/DL — SIGNIFICANT CHANGE UP (ref 0.5–1.3)
GLUCOSE SERPL-MCNC: 94 MG/DL — SIGNIFICANT CHANGE UP (ref 70–99)
HCT VFR BLD CALC: 50.4 % — HIGH (ref 39–50)
HGB BLD-MCNC: 16.2 G/DL — SIGNIFICANT CHANGE UP (ref 13–17)
MAGNESIUM SERPL-MCNC: 2.3 MG/DL — SIGNIFICANT CHANGE UP (ref 1.6–2.6)
MCHC RBC-ENTMCNC: 28.2 PG — SIGNIFICANT CHANGE UP (ref 27–34)
MCHC RBC-ENTMCNC: 32.1 % — SIGNIFICANT CHANGE UP (ref 32–36)
MCV RBC AUTO: 87.7 FL — SIGNIFICANT CHANGE UP (ref 80–100)
NRBC # FLD: 0 K/UL — LOW (ref 25–125)
PLATELET # BLD AUTO: 283 K/UL — SIGNIFICANT CHANGE UP (ref 150–400)
PMV BLD: 10.6 FL — SIGNIFICANT CHANGE UP (ref 7–13)
POTASSIUM SERPL-MCNC: 4 MMOL/L — SIGNIFICANT CHANGE UP (ref 3.5–5.3)
POTASSIUM SERPL-SCNC: 4 MMOL/L — SIGNIFICANT CHANGE UP (ref 3.5–5.3)
RBC # BLD: 5.75 M/UL — SIGNIFICANT CHANGE UP (ref 4.2–5.8)
RBC # FLD: 14.3 % — SIGNIFICANT CHANGE UP (ref 10.3–14.5)
SODIUM SERPL-SCNC: 138 MMOL/L — SIGNIFICANT CHANGE UP (ref 135–145)
WBC # BLD: 8.64 K/UL — SIGNIFICANT CHANGE UP (ref 3.8–10.5)
WBC # FLD AUTO: 8.64 K/UL — SIGNIFICANT CHANGE UP (ref 3.8–10.5)

## 2019-01-28 RX ORDER — DILTIAZEM HCL 120 MG
1 CAPSULE, EXT RELEASE 24 HR ORAL
Qty: 30 | Refills: 0 | OUTPATIENT
Start: 2019-01-28 | End: 2019-02-26

## 2019-01-28 RX ORDER — BUDESONIDE AND FORMOTEROL FUMARATE DIHYDRATE 160; 4.5 UG/1; UG/1
2 AEROSOL RESPIRATORY (INHALATION)
Qty: 1 | Refills: 0
Start: 2019-01-28 | End: 2019-02-26

## 2019-01-28 RX ADMIN — Medication 40 MILLIGRAM(S): at 06:06

## 2019-01-28 RX ADMIN — Medication 81 MILLIGRAM(S): at 12:04

## 2019-01-28 RX ADMIN — Medication 180 MILLIGRAM(S): at 06:06

## 2019-01-28 RX ADMIN — BUDESONIDE AND FORMOTEROL FUMARATE DIHYDRATE 2 PUFF(S): 160; 4.5 AEROSOL RESPIRATORY (INHALATION) at 10:26

## 2019-01-28 RX ADMIN — Medication 60 MILLIGRAM(S): at 06:06

## 2019-03-03 ENCOUNTER — INPATIENT (INPATIENT)
Facility: HOSPITAL | Age: 59
LOS: 1 days | Discharge: HOME | End: 2019-03-05
Attending: HOSPITALIST | Admitting: HOSPITALIST
Payer: COMMERCIAL

## 2019-03-03 VITALS
RESPIRATION RATE: 24 BRPM | SYSTOLIC BLOOD PRESSURE: 100 MMHG | OXYGEN SATURATION: 95 % | DIASTOLIC BLOOD PRESSURE: 62 MMHG | HEART RATE: 170 BPM | WEIGHT: 304.9 LBS

## 2019-03-03 DIAGNOSIS — Z98.890 OTHER SPECIFIED POSTPROCEDURAL STATES: Chronic | ICD-10-CM

## 2019-03-03 LAB
ALBUMIN SERPL ELPH-MCNC: 3.7 G/DL — SIGNIFICANT CHANGE UP (ref 3.3–5)
ALP SERPL-CCNC: 100 U/L — SIGNIFICANT CHANGE UP (ref 40–120)
ALT FLD-CCNC: 15 U/L — SIGNIFICANT CHANGE UP (ref 4–41)
ANION GAP SERPL CALC-SCNC: 19 MMO/L — HIGH (ref 7–14)
AST SERPL-CCNC: 16 U/L — SIGNIFICANT CHANGE UP (ref 4–40)
BASE EXCESS BLDV CALC-SCNC: -1.4 MMOL/L — SIGNIFICANT CHANGE UP
BASOPHILS # BLD AUTO: 0.04 K/UL — SIGNIFICANT CHANGE UP (ref 0–0.2)
BASOPHILS NFR BLD AUTO: 0.4 % — SIGNIFICANT CHANGE UP (ref 0–2)
BILIRUB SERPL-MCNC: 0.5 MG/DL — SIGNIFICANT CHANGE UP (ref 0.2–1.2)
BLOOD GAS VENOUS - CREATININE: 1.1 MG/DL — SIGNIFICANT CHANGE UP (ref 0.5–1.3)
BUN SERPL-MCNC: 16 MG/DL — SIGNIFICANT CHANGE UP (ref 7–23)
CALCIUM SERPL-MCNC: 9.5 MG/DL — SIGNIFICANT CHANGE UP (ref 8.4–10.5)
CHLORIDE BLDV-SCNC: 102 MMOL/L — SIGNIFICANT CHANGE UP (ref 96–108)
CHLORIDE SERPL-SCNC: 96 MMOL/L — LOW (ref 98–107)
CO2 SERPL-SCNC: 19 MMOL/L — LOW (ref 22–31)
CREAT SERPL-MCNC: 1.18 MG/DL — SIGNIFICANT CHANGE UP (ref 0.5–1.3)
EOSINOPHIL # BLD AUTO: 0.24 K/UL — SIGNIFICANT CHANGE UP (ref 0–0.5)
EOSINOPHIL NFR BLD AUTO: 2.5 % — SIGNIFICANT CHANGE UP (ref 0–6)
GAS PNL BLDV: 131 MMOL/L — LOW (ref 136–146)
GLUCOSE BLDV-MCNC: 127 — HIGH (ref 70–99)
GLUCOSE SERPL-MCNC: 122 MG/DL — HIGH (ref 70–99)
HCO3 BLDV-SCNC: 23 MMOL/L — SIGNIFICANT CHANGE UP (ref 20–27)
HCT VFR BLD CALC: 54.9 % — HIGH (ref 39–50)
HCT VFR BLDV CALC: 55.4 % — HIGH (ref 39–51)
HGB BLD-MCNC: 17.3 G/DL — HIGH (ref 13–17)
HGB BLDV-MCNC: 18.1 G/DL — HIGH (ref 13–17)
IMM GRANULOCYTES NFR BLD AUTO: 0.6 % — SIGNIFICANT CHANGE UP (ref 0–1.5)
LACTATE BLDV-MCNC: 3 MMOL/L — HIGH (ref 0.5–2)
LYMPHOCYTES # BLD AUTO: 4.51 K/UL — HIGH (ref 1–3.3)
LYMPHOCYTES # BLD AUTO: 46.4 % — HIGH (ref 13–44)
MCHC RBC-ENTMCNC: 27.8 PG — SIGNIFICANT CHANGE UP (ref 27–34)
MCHC RBC-ENTMCNC: 31.5 % — LOW (ref 32–36)
MCV RBC AUTO: 88.3 FL — SIGNIFICANT CHANGE UP (ref 80–100)
MONOCYTES # BLD AUTO: 0.97 K/UL — HIGH (ref 0–0.9)
MONOCYTES NFR BLD AUTO: 10 % — SIGNIFICANT CHANGE UP (ref 2–14)
NEUTROPHILS # BLD AUTO: 3.9 K/UL — SIGNIFICANT CHANGE UP (ref 1.8–7.4)
NEUTROPHILS NFR BLD AUTO: 40.1 % — LOW (ref 43–77)
NRBC # FLD: 0 K/UL — LOW (ref 25–125)
PCO2 BLDV: 37 MMHG — LOW (ref 41–51)
PH BLDV: 7.4 PH — SIGNIFICANT CHANGE UP (ref 7.32–7.43)
PLATELET # BLD AUTO: 514 K/UL — HIGH (ref 150–400)
PMV BLD: 9.8 FL — SIGNIFICANT CHANGE UP (ref 7–13)
PO2 BLDV: 80 MMHG — HIGH (ref 35–40)
POTASSIUM BLDV-SCNC: 4 MMOL/L — SIGNIFICANT CHANGE UP (ref 3.4–4.5)
POTASSIUM SERPL-MCNC: 4.4 MMOL/L — SIGNIFICANT CHANGE UP (ref 3.5–5.3)
POTASSIUM SERPL-SCNC: 4.4 MMOL/L — SIGNIFICANT CHANGE UP (ref 3.5–5.3)
PROT SERPL-MCNC: 7.4 G/DL — SIGNIFICANT CHANGE UP (ref 6–8.3)
RBC # BLD: 6.22 M/UL — HIGH (ref 4.2–5.8)
RBC # FLD: 13.8 % — SIGNIFICANT CHANGE UP (ref 10.3–14.5)
SAO2 % BLDV: 95 % — HIGH (ref 60–85)
SODIUM SERPL-SCNC: 134 MMOL/L — LOW (ref 135–145)
TROPONIN T, HIGH SENSITIVITY: 16 NG/L — SIGNIFICANT CHANGE UP (ref ?–14)
WBC # BLD: 9.72 K/UL — SIGNIFICANT CHANGE UP (ref 3.8–10.5)
WBC # FLD AUTO: 9.72 K/UL — SIGNIFICANT CHANGE UP (ref 3.8–10.5)

## 2019-03-03 RX ORDER — DIPHENHYDRAMINE HCL 50 MG
50 CAPSULE ORAL EVERY 4 HOURS
Qty: 0 | Refills: 0 | Status: DISCONTINUED | OUTPATIENT
Start: 2019-03-03 | End: 2019-03-05

## 2019-03-03 RX ORDER — ONDANSETRON 8 MG/1
4 TABLET, FILM COATED ORAL ONCE
Qty: 0 | Refills: 0 | Status: COMPLETED | OUTPATIENT
Start: 2019-03-03 | End: 2019-03-03

## 2019-03-03 RX ORDER — EPINEPHRINE 0.3 MG/.3ML
0.3 INJECTION INTRAMUSCULAR; SUBCUTANEOUS ONCE
Qty: 0 | Refills: 0 | Status: COMPLETED | OUTPATIENT
Start: 2019-03-03 | End: 2019-03-03

## 2019-03-03 RX ORDER — SODIUM CHLORIDE 9 MG/ML
1000 INJECTION, SOLUTION INTRAVENOUS ONCE
Qty: 0 | Refills: 0 | Status: COMPLETED | OUTPATIENT
Start: 2019-03-03 | End: 2019-03-03

## 2019-03-03 RX ADMIN — SODIUM CHLORIDE 1000 MILLILITER(S): 9 INJECTION, SOLUTION INTRAVENOUS at 22:12

## 2019-03-03 RX ADMIN — ONDANSETRON 4 MILLIGRAM(S): 8 TABLET, FILM COATED ORAL at 21:11

## 2019-03-03 RX ADMIN — Medication 125 MILLIGRAM(S): at 21:11

## 2019-03-03 RX ADMIN — Medication 50 MILLIGRAM(S): at 22:12

## 2019-03-03 RX ADMIN — EPINEPHRINE 0.3 MILLIGRAM(S): 0.3 INJECTION INTRAMUSCULAR; SUBCUTANEOUS at 21:11

## 2019-03-03 NOTE — CONSULT NOTE ADULT - ASSESSMENT
#Anaphylactic Reaction  -s/p Epi x2, Solumedrol, and Benadryl with significant improvement. IV Pepcid if not given already  -currently maintaining SpO2 95+ on NRB, speaking in full sentences, de-escalate supplemental O2 as tolerated  -consider nonurgent breathing treatment (i.e. Xopenex) when HR is under better control  -rec'd ENT for laryngoscope to evaluate airway 59yo M with PMH of AFib (no AC), Hypothyroidism, and Asthma (no previous intubations) presenting with shortness of breath and tongue swelling concerning for anaphylactic reaction, now symptomatically improved.    #Anaphylactic Reaction  -s/p Epi x2, Solumedrol, and Benadryl with significant improvement. Pepcid IV if not given already  -currently maintaining SpO2 95+ on NRB, speaking in full sentences, de-escalate supplemental O2 as tolerated  -maintain adequate BPs, no signs of shock state  -consider nonurgent breathing treatment (i.e. Xopenex) when HR is suitable  -rec'd ENT for laryngoscope to evaluate airway  -will re-evaluate the need for monitored setting pending laryngoscope    Please call with questions/concerns.    Alex Martinez M.D.  Medicine Resident, PGY-3  Pager: [phone]  Spectra: [phone] / Unit: [id number] 57yo M with PMH of AFib (no AC), Hypothyroidism, and Asthma (no previous intubations) presenting with shortness of breath and tongue swelling concerning for anaphylactic reaction, now symptomatically improved.    #Anaphylactic Reaction  -s/p Epi x2, Solumedrol, and Benadryl with significant improvement. Pepcid IV if not given already  -currently maintaining SpO2 95+ on NRB, speaking in full sentences. de-escalate supplemental O2 as tolerated  -maintain adequate BPs, no signs of shock state  -consider nonurgent breathing treatment (i.e. Xopenex) when HR is suitable  -rec'd ENT for laryngoscope to evaluate airway  -will re-evaluate the need for monitored setting pending laryngoscope    Please call with questions/concerns.    Alex Martinez M.D.  Medicine Resident, PGY-3  Pager: [phone]  Spectra: [phone] / Unit: [id number] 57yo M with PMH of AFib (no AC), Hypothyroidism, and Asthma (no previous intubations) presenting with shortness of breath and tongue swelling concerning for anaphylactic reaction, now symptomatically improved.    #Anaphylactic Reaction  -s/p Epi x2, Solumedrol, and Benadryl with significant improvement. Pepcid IV if not given already  -currently maintaining SpO2 95+ on NRB, speaking in full sentences. de-escalate supplemental O2 as tolerated  -maintain adequate BPs, no signs of shock state  -consider nonurgent breathing treatment (i.e. Xopenex) when HR is suitable  -if patient develops stridor, suggest nebulized racemic epi  -rec'd ENT for laryngoscope to evaluate airway  -will re-evaluate the need for monitored setting pending laryngoscope    Please call with questions/concerns.    Alex Martinez M.D.  Medicine Resident, PGY-3  Pager: [phone]  Spectra: [phone] / Unit: [id number] 57yo M with PMH of AFib (no AC), Hypothyroidism, and Asthma (no previous intubations) presenting with shortness of breath and tongue swelling concerning for anaphylactic reaction, now symptomatically improved.    #Anaphylactic Reaction  -s/p Epi x2, Solumedrol, and Benadryl with significant improvement. Pepcid IV if not given already  -currently maintaining SpO2 95+ on NRB, speaking in full sentences. de-escalate supplemental O2 as tolerated  -maintain adequate BPs, no signs of shock state  -consider nonurgent breathing treatment (i.e. Xopenex) when HR is suitable  -if patient develops stridor, suggest nebulized racemic epi  -rec'd ENT for laryngoscope to evaluate airway  -will re-evaluate the need for monitored setting pending laryngoscope    Does not require ICU care at this time.  Please call with questions/concerns.    Alex Martinez M.D.  Medicine Resident, PGY-3  Pager: [phone]  Spectra: [phone] / Unit: [id number]

## 2019-03-03 NOTE — ED PROVIDER NOTE - PROGRESS NOTE DETAILS
Pt given cardizem IV (normally takes 120 cardizem PO at night) after tachycardia 2/2 epi x 2 for anaphylaxis. ENT saw patient, no posterior swelling. MICU saw patient, cleared for floor admission. Pt admitted to tele floor for pulse ox monitoring.

## 2019-03-03 NOTE — ED ADULT NURSE NOTE - OBJECTIVE STATEMENT
Pt arrives to TC Notification ANAPHALAXIS diff swallowing s/p eating shrimp dinner at 6:30pm Pt reports intially head started to itch,broke out in hives then felt tongue swelling .  Pt aox4, + angioedema, drooling, muffled voice, + hives noted on face pt on 100% NRB . Pt received epi IM by EMS. Placed on cm, IV access x2 placed,  Epi .3 IM #2 given in ED, Pt vomiting following clear, HOB elevated, suction provided, Meds given as per orders, EKG = AFIB 170's.

## 2019-03-03 NOTE — ED ADULT TRIAGE NOTE - CHIEF COMPLAINT QUOTE
PT arrives via [facility], As per EMT" After eating dinner (Schrimp) his head started to itch broke out in hives and felt his throat was closing.) Pt arrives hive noted on face, + tongue swelling Pt voice muffled. EPI x1 IM given by EMS.

## 2019-03-03 NOTE — CONSULT NOTE ADULT - SUBJECTIVE AND OBJECTIVE BOX
HPI: 59yo M with PMH of AFib (no AC), hypothyroidism and asthma (no previous intubations) presents with SOB, tongue swelling and hives after dinner. Wife reports that patient ate shrimp at 6pm, immediately started to have itching of the scalp with subsequent onsent of hives. He then started to have swelling of the face/eyes and then tongue, worse on the R side. Patient says at that time he had shortness of breath and difficulty swallowing secretions. He denied hoarse voice. EMS came and administered epi with improvement. In the ED he received decadron, benadryl, epi, ranitidine with further improvement.  This has never happened to him before. He denies any known food allergies. Does not take ACE-I. No family history of similar events. ENT called to evaluate for laryngeal edema. At time of exam, patient denied difficulty breathing, voice change or difficulty swallowing.     T(C): --  HR: 123 (03-03-19 @ 23:27) (123 - 170)  BP: 116/86 (03-03-19 @ 23:27) (96/81 - 132/90)  RR: 20 (03-03-19 @ 23:27) (20 - 24)  SpO2: 94% (03-03-19 @ 23:27) (94% - 98%)  NAD  Breathing comfortably on RA, no stridor, no stertor  Face symmetric, mild bilateral eyelid swelling  NC clear anteriorly  OC: tongue enlarged, protruding, unable to see posterior OP, can see entire soft palate which is non edematous, non erythematous  Neck: soft, STEPHENIE, no palpable LAD or edema    Scope:  NC/NP: wnl  OC/OP: wnl  BOT full, unable to visualize vallecula  Epiglottis/AE fold sharp  Bilateral TVF mobility wnl, no edema  Airway patent    A/P: 59yo M with no prior episodes of anaphylaxis p/w anaphylaxis possibly 2/2 shellfish, now resolving s/p epi. No laryngeal involvement.   - continue benadryl  - dispo per ED  - no concern for acute airway compromise  - no acute ent intervention  - allergy referral  - discussed with attending

## 2019-03-03 NOTE — CONSULT NOTE ADULT - ATTENDING COMMENTS
anaphylaxis  steroids, benadryl, pepcid epi  ENT eval   continous pulse ox  patient improving in ED  reconsult as needed

## 2019-03-03 NOTE — ED ADULT NURSE NOTE - NSIMPLEMENTINTERV_GEN_ALL_ED
Implemented All Universal Safety Interventions:  [place] to call system. Call bell, personal items and telephone within reach. Instruct patient to call for assistance. Room bathroom lighting operational. Non-slip footwear when patient is off stretcher. Physically safe environment: no spills, clutter or unnecessary equipment. Stretcher in lowest position, wheels locked, appropriate side rails in place.

## 2019-03-03 NOTE — ED PROVIDER NOTE - ATTENDING CONTRIBUTION TO CARE
Attending note:   After face to face evaluation of this patient, I concur with above noted hx, pe, and care plan for this patient.  57 y/o heavy w M with known afib, on cardizem with swollen tongue after eating shrimp tonight with associated dyspnea.   Epi, pepcid, steroids , benadryl administered.  Evaluation in progress

## 2019-03-03 NOTE — ED PROVIDER NOTE - CLINICAL SUMMARY MEDICAL DECISION MAKING FREE TEXT BOX
+swollen tongue, dyspnea, allergic rxn +swollen tongue, dyspnea, allergic rxn  Plan: epi PRN, benadryl, pepcid, antiemetics PRN, O2 PRN, CXR, labs

## 2019-03-03 NOTE — CONSULT NOTE ADULT - SUBJECTIVE AND OBJECTIVE BOX
CHIEF COMPLAINT:    HPI:    PAST MEDICAL & SURGICAL HISTORY:  AF (atrial fibrillation)  Hypothyroidism  Asthma  S/P arthroscopic surgery of right knee: Meniscus repair      FAMILY HISTORY:  No pertinent family history in first degree relatives      SOCIAL HISTORY:  Smoking: [ ] Never Smoked [ ] Former Smoker (__ packs x ___ years) [ ] Current Smoker  (__ packs x ___ years)  Substance Use: [ ] Never Used [ ] Used ____  EtOH Use:  Marital Status: [ ] Single [ ]  [ ]  [ ]   Sexual History:   Occupation:  Recent Travel:  Country of Birth:  Advance Directives:    Allergies    No Known Drug Allergies  shellfish (Angioedema)    Intolerances        HOME MEDICATIONS:    REVIEW OF SYSTEMS:  Constitutional: [ ] negative [ ] fevers [ ] chills [ ] weight loss [ ] weight gain  HEENT: [ ] negative [ ] dry eyes [ ] eye irritation [ ] postnasal drip [ ] nasal congestion  CV: [ ] negative  [ ] chest pain [ ] orthopnea [ ] palpitations [ ] murmur  Resp: [ ] negative [ ] cough [ ] shortness of breath [ ] dyspnea [ ] wheezing [ ] sputum [ ] hemoptysis  GI: [ ] negative [ ] nausea [ ] vomiting [ ] diarrhea [ ] constipation [ ] abd pain [ ] dysphagia   : [ ] negative [ ] dysuria [ ] nocturia [ ] hematuria [ ] increased urinary frequency  Musculoskeletal: [ ] negative [ ] back pain [ ] myalgias [ ] arthralgias [ ] fracture  Skin: [ ] negative [ ] rash [ ] itch  Neurological: [ ] negative [ ] headache [ ] dizziness [ ] syncope [ ] weakness [ ] numbness  Psychiatric: [ ] negative [ ] anxiety [ ] depression  Endocrine: [ ] negative [ ] diabetes [ ] thyroid problem  Hematologic/Lymphatic: [ ] negative [ ] anemia [ ] bleeding problem  Allergic/Immunologic: [ ] negative [ ] itchy eyes [ ] nasal discharge [ ] hives [ ] angioedema  [ ] All other systems negative  [ ] Unable to assess ROS because ________    OBJECTIVE:  ICU Vital Signs Last 24 Hrs  T(C): --  T(F): --  HR: 154 (03 Mar 2019 21:55) (154 - 170)  BP: 96/81 (03 Mar 2019 21:55) (96/81 - 114/74)  BP(mean): --  ABP: --  ABP(mean): --  RR: 22 (03 Mar 2019 21:55) (22 - 24)  SpO2: 98% (03 Mar 2019 21:55) (95% - 98%)        CAPILLARY BLOOD GLUCOSE      POCT Blood Glucose.: 133 mg/dL (03 Mar 2019 21:09)      PHYSICAL EXAM:  General:   HEENT:   Lymph Nodes:  Neck:   Respiratory:   Cardiovascular:   Abdomen:   Extremities:   Skin:   Neurological:  Psychiatry:    LINES:     HOSPITAL MEDICATIONS:      EPINEPHrine     1 mG/mL Injectable 0.3 milliGRAM(s) IntraMuscular once    methylPREDNISolone sodium succinate Injectable 125 milliGRAM(s) IV Push Once    diphenhydrAMINE   Injectable 50 milliGRAM(s) IntraMuscular every 4 hours PRN    ondansetron Injectable 4 milliGRAM(s) IV Push once          lactated ringers Bolus 1000 milliLiter(s) IV Bolus once            LABS:    Hgb Trend:         Creatinine Trend:             MICROBIOLOGY:     RADIOLOGY:  [ ] Reviewed and interpreted by me    EKG: CHIEF COMPLAINT: Allergic Reaction    HPI: 57yo M with PMH of AFib (no AC), Hypothyroidism, and Asthma (no previous intubations) presenting with shortness of breath, tongue swelling, and hives after dinner. Patient ate shrimp (prepared by his wife) around 6/7pm and then noticed that his scalp was itchy. This progressed to widespread hives over his face and neck. He developed shortness of breath and tongue swelling and felt like his throat was closing. EMS was called and gave Epi x1 for     PAST MEDICAL & SURGICAL HISTORY:  AF (atrial fibrillation)  Hypothyroidism  Asthma  S/P arthroscopic surgery of right knee: Meniscus repair      FAMILY HISTORY:  No pertinent family history in first degree relatives      SOCIAL HISTORY:  Smoking: [ ] Never Smoked [ ] Former Smoker (__ packs x ___ years) [ ] Current Smoker  (__ packs x ___ years)  Substance Use: [ ] Never Used [ ] Used ____  EtOH Use:  Marital Status: [ ] Single [ ]  [ ]  [ ]   Sexual History:   Occupation:  Recent Travel:  Country of Birth:  Advance Directives:    Allergies    No Known Drug Allergies  shellfish (Angioedema)    Intolerances        HOME MEDICATIONS:    REVIEW OF SYSTEMS:  Constitutional: [ ] negative [ ] fevers [ ] chills [ ] weight loss [ ] weight gain  HEENT: [ ] negative [ ] dry eyes [ ] eye irritation [ ] postnasal drip [ ] nasal congestion  CV: [ ] negative  [ ] chest pain [ ] orthopnea [ ] palpitations [ ] murmur  Resp: [ ] negative [ ] cough [ ] shortness of breath [ ] dyspnea [ ] wheezing [ ] sputum [ ] hemoptysis  GI: [ ] negative [ ] nausea [ ] vomiting [ ] diarrhea [ ] constipation [ ] abd pain [ ] dysphagia   : [ ] negative [ ] dysuria [ ] nocturia [ ] hematuria [ ] increased urinary frequency  Musculoskeletal: [ ] negative [ ] back pain [ ] myalgias [ ] arthralgias [ ] fracture  Skin: [ ] negative [ ] rash [ ] itch  Neurological: [ ] negative [ ] headache [ ] dizziness [ ] syncope [ ] weakness [ ] numbness  Psychiatric: [ ] negative [ ] anxiety [ ] depression  Endocrine: [ ] negative [ ] diabetes [ ] thyroid problem  Hematologic/Lymphatic: [ ] negative [ ] anemia [ ] bleeding problem  Allergic/Immunologic: [ ] negative [ ] itchy eyes [ ] nasal discharge [ ] hives [ ] angioedema  [ ] All other systems negative  [ ] Unable to assess ROS because ________    OBJECTIVE:  ICU Vital Signs Last 24 Hrs  T(C): --  T(F): --  HR: 154 (03 Mar 2019 21:55) (154 - 170)  BP: 96/81 (03 Mar 2019 21:55) (96/81 - 114/74)  BP(mean): --  ABP: --  ABP(mean): --  RR: 22 (03 Mar 2019 21:55) (22 - 24)  SpO2: 98% (03 Mar 2019 21:55) (95% - 98%)        CAPILLARY BLOOD GLUCOSE      POCT Blood Glucose.: 133 mg/dL (03 Mar 2019 21:09)      PHYSICAL EXAM:  General:   HEENT:   Lymph Nodes:  Neck:   Respiratory:   Cardiovascular:   Abdomen:   Extremities:   Skin:   Neurological:  Psychiatry:    LINES:     HOSPITAL MEDICATIONS:      EPINEPHrine     1 mG/mL Injectable 0.3 milliGRAM(s) IntraMuscular once    methylPREDNISolone sodium succinate Injectable 125 milliGRAM(s) IV Push Once    diphenhydrAMINE   Injectable 50 milliGRAM(s) IntraMuscular every 4 hours PRN    ondansetron Injectable 4 milliGRAM(s) IV Push once          lactated ringers Bolus 1000 milliLiter(s) IV Bolus once            LABS:    Hgb Trend:         Creatinine Trend:             MICROBIOLOGY:     RADIOLOGY:  [ ] Reviewed and interpreted by me    EKG: CHIEF COMPLAINT: Allergic Reaction    HPI: 59yo M with PMH of AFib (no AC), Hypothyroidism, and Asthma (no previous intubations) presenting with shortness of breath, tongue swelling, and hives after dinner. Patient ate shrimp (prepared by his wife) around 6/7pm and then noticed that his scalp was itchy. This progressed to widespread hives over his face and neck. He developed shortness of breath and tongue swelling and felt like his throat was closing. EMS was called and gave Epi x1 for     PAST MEDICAL & SURGICAL HISTORY:  AF (atrial fibrillation)  Hypothyroidism  Asthma  S/P arthroscopic surgery of right knee: Meniscus repair      FAMILY HISTORY:  No pertinent family history in first degree relatives      SOCIAL HISTORY:  Smoking: [x] Never Smoked [ ] Former Smoker (__ packs x ___ years) [ ] Current Smoker  (__ packs x ___ years)  Substance Use: [x] Never Used [ ] Used ____  EtOH Use: X  Marital Status: [ ] Single [X]  [ ]  [ ]   Sexual History: Monogamous, heterosexual  Occupation: Superintendent   Recent Travel: X  Country of Birth:  Advance Directives: X    Allergies  No Known Drug Allergies  shellfish (Angioedema)    HOME MEDICATIONS:  ASA  Diltiazem  Ventolin  Budesonide-formoterol   [place]      REVIEW OF SYSTEMS:  Constitutional: [x ] negative [ ] fevers [ ] chills [ ] weight loss [ ] weight gain  HEENT: [ ] negative [ ] dry eyes [x ] eye irritation [ ] postnasal drip [ ] nasal congestion  CV: [x ] negative  [ ] chest pain [ ] orthopnea [ ] palpitations [ ] murmur  Resp: [ ] negative [x ] cough [x ] shortness of breath [x ] dyspnea [ ] wheezing [ ] sputum [ ] hemoptysis  GI: [ ] negative [x ] nausea [x ] vomiting [ ] diarrhea [ ] constipation [ ] abd pain [ ] dysphagia   : [x ] negative [ ] dysuria [ ] nocturia [ ] hematuria [ ] increased urinary frequency  Musculoskeletal: [x ] negative [ ] back pain [ ] myalgias [ ] arthralgias [ ] fracture  Skin: [ ] negative [x ] rash [x ] itch  Neurological: [x ] negative [ ] headache [ ] dizziness [ ] syncope [ ] weakness [ ] numbness  Psychiatric: [x ] negative [ ] anxiety [ ] depression  Endocrine: [ ] negative [ ] diabetes [x ] thyroid problem  Hematologic/Lymphatic: [x ] negative [ ] anemia [ ] bleeding problem  Allergic/Immunologic: [ ] negative [ ] itchy eyes [ ] nasal discharge [x ] hives [x ] angioedema  [x ] All other systems negative  [ ] Unable to assess ROS because ________    OBJECTIVE:  ICU Vital Signs Last 24 Hrs  HR: 154 (03 Mar 2019 21:55) (154 - 170)  BP: 96/81 (03 Mar 2019 21:55) (96/81 - 114/74)  RR: 22 (03 Mar 2019 21:55) (22 - 24)  SpO2: 98% (03 Mar 2019 21:55) (95% - 98%)        CAPILLARY BLOOD GLUCOSE  POCT Blood Glucose.: 133 mg/dL (03 Mar 2019 21:09)      PHYSICAL EXAM:  General: NAD, speaking in full sentences, obese  HEENT: scleral injection, tongue swelling  Lymph Nodes: no LAD  Neck: no JVD  Respiratory: scattered wheezes, moving air well to bases bilaterally  Cardiovascular: tachycardic, irregularly irregular  Abdomen: obese, soft, BS+  Extremities: trace LE edema, no cyanosis/clubbing  Skin: resolving, scattered blanchable, erythematous hives primarily involving face and neck  Neurological: AAOx3, nonfocal, moving all extremities  Psychiatry: normal affect    LINES:     HOSPITAL MEDICATIONS:  EPINEPHrine     1 mG/mL Injectable 0.3 milliGRAM(s) IntraMuscular once  methylPREDNISolone sodium succinate Injectable 125 milliGRAM(s) IV Push Once  diphenhydrAMINE   Injectable 50 milliGRAM(s) IntraMuscular every 4 hours PRN  ondansetron Injectable 4 milliGRAM(s) IV Push once  lactated ringers Bolus 1000 milliLiter(s) IV Bolus once      LABS: Pending      MICROBIOLOGY:     RADIOLOGY:  [ ] Reviewed and interpreted by me    EKG: CHIEF COMPLAINT: Allergic Reaction    HPI: 57yo M with PMH of AFib (no AC), Hypothyroidism, and Asthma (no previous intubations) presenting with shortness of breath, tongue swelling, and hives after dinner. Patient ate shrimp (prepared by his wife) around 6/7pm and then noticed that his scalp was itchy. This progressed to widespread hives over his face and neck. He developed shortness of breath and tongue swelling and felt like his throat was closing. EMS was called and gave Epi x1 for allergic reaction. Upon presentation to ED, patient given additional Epi as well as Solumedrol and Benadryl. Patient vomited x1 and felt better. Wife at bedside reports patient previously was covered in hives but the rash is now resolving. Patient has never had an issue eating shellfish prior to this episode. Currently he denies fever, CP, Abd Pain. No recent travel or sick contacts. No new medications. No history of allergic reactions, angioedema.    PAST MEDICAL & SURGICAL HISTORY:  AF (atrial fibrillation)  Hypothyroidism  Asthma  S/P arthroscopic surgery of right knee: Meniscus repair      FAMILY HISTORY:  No pertinent family history in first degree relatives      SOCIAL HISTORY:  Smoking: [x] Never Smoked [ ] Former Smoker (__ packs x ___ years) [ ] Current Smoker  (__ packs x ___ years)  Substance Use: [x] Never Used [ ] Used ____  EtOH Use: X  Marital Status: [ ] Single [X]  [ ]  [ ]   Sexual History: Monogamous, heterosexual  Occupation: Superintendent   Recent Travel: X  Country of Birth:  Advance Directives: X    Allergies  No Known Drug Allergies  shellfish (Angioedema)    HOME MEDICATIONS:  ASA  Diltiazem  Ventolin  Budesonide-formoterol   [place]      REVIEW OF SYSTEMS:  Constitutional: [x ] negative [ ] fevers [ ] chills [ ] weight loss [ ] weight gain  HEENT: [ ] negative [ ] dry eyes [x ] eye irritation [ ] postnasal drip [ ] nasal congestion  CV: [x ] negative  [ ] chest pain [ ] orthopnea [ ] palpitations [ ] murmur  Resp: [ ] negative [x ] cough [x ] shortness of breath [x ] dyspnea [ ] wheezing [ ] sputum [ ] hemoptysis  GI: [ ] negative [x ] nausea [x ] vomiting [ ] diarrhea [ ] constipation [ ] abd pain [ ] dysphagia   : [x ] negative [ ] dysuria [ ] nocturia [ ] hematuria [ ] increased urinary frequency  Musculoskeletal: [x ] negative [ ] back pain [ ] myalgias [ ] arthralgias [ ] fracture  Skin: [ ] negative [x ] rash [x ] itch  Neurological: [x ] negative [ ] headache [ ] dizziness [ ] syncope [ ] weakness [ ] numbness  Psychiatric: [x ] negative [ ] anxiety [ ] depression  Endocrine: [ ] negative [ ] diabetes [x ] thyroid problem  Hematologic/Lymphatic: [x ] negative [ ] anemia [ ] bleeding problem  Allergic/Immunologic: [ ] negative [ ] itchy eyes [ ] nasal discharge [x ] hives [x ] angioedema  [x ] All other systems negative  [ ] Unable to assess ROS because ________    OBJECTIVE:  ICU Vital Signs Last 24 Hrs  HR: 154 (03 Mar 2019 21:55) (154 - 170)  BP: 96/81 (03 Mar 2019 21:55) (96/81 - 114/74)  RR: 22 (03 Mar 2019 21:55) (22 - 24)  SpO2: 98% (03 Mar 2019 21:55) (95% - 98%)        CAPILLARY BLOOD GLUCOSE  POCT Blood Glucose.: 133 mg/dL (03 Mar 2019 21:09)      PHYSICAL EXAM:  General: NAD, speaking in full sentences, obese  HEENT: scleral injection, tongue swelling  Lymph Nodes: no LAD  Neck: no JVD  Respiratory: scattered wheezes, moving air well to bases bilaterally  Cardiovascular: tachycardic, irregularly irregular  Abdomen: obese, soft, BS+  Extremities: trace LE edema, no cyanosis/clubbing  Skin: resolving, scattered blanchable, erythematous hives primarily involving face and neck  Neurological: AAOx3, nonfocal, moving all extremities  Psychiatry: normal affect    LINES:     HOSPITAL MEDICATIONS:  EPINEPHrine     1 mG/mL Injectable 0.3 milliGRAM(s) IntraMuscular once  methylPREDNISolone sodium succinate Injectable 125 milliGRAM(s) IV Push Once  diphenhydrAMINE   Injectable 50 milliGRAM(s) IntraMuscular every 4 hours PRN  ondansetron Injectable 4 milliGRAM(s) IV Push once  lactated ringers Bolus 1000 milliLiter(s) IV Bolus once      LABS: Pending      MICROBIOLOGY:     RADIOLOGY:  [ ] Reviewed and interpreted by me    EKG: CHIEF COMPLAINT: Allergic Reaction    HPI: 59yo M with PMH of AFib (no AC), Hypothyroidism, and Asthma (no previous intubations) presenting with shortness of breath, tongue swelling, and hives after dinner. Patient ate shrimp (prepared by his wife) around 6/7pm and then noticed that his scalp was itchy. This progressed to widespread hives over his face and neck. He developed shortness of breath and tongue swelling and felt like his throat was closing. EMS was called and gave Epi x1 for allergic reaction. Upon presentation to ED, patient given additional Epi as well as Solumedrol and Benadryl. Patient vomited x1 and felt better. Wife at bedside reports patient previously was covered in hives but the rash is now improving. Patient has never had an issue eating shellfish prior to this episode. Currently he denies fever, CP, Abd Pain. No recent travel or sick contacts. No new medications. No history of allergic reactions, angioedema.    PAST MEDICAL & SURGICAL HISTORY:  AF (atrial fibrillation)  Hypothyroidism  Asthma  S/P arthroscopic surgery of right knee: Meniscus repair      FAMILY HISTORY:  No pertinent family history in first degree relatives      SOCIAL HISTORY:  Smoking: [x] Never Smoked [ ] Former Smoker (__ packs x ___ years) [ ] Current Smoker  (__ packs x ___ years)  Substance Use: [x] Never Used [ ] Used ____  EtOH Use: X  Marital Status: [ ] Single [X]  [ ]  [ ]   Sexual History: Monogamous, heterosexual  Occupation: Superintendent   Recent Travel: X  Country of Birth:  Advance Directives: X    Allergies  No Known Drug Allergies  shellfish (Angioedema)    HOME MEDICATIONS:  ASA  Diltiazem  Ventolin  Budesonide-formoterol   [place]      REVIEW OF SYSTEMS:  Constitutional: [x ] negative [ ] fevers [ ] chills [ ] weight loss [ ] weight gain  HEENT: [ ] negative [ ] dry eyes [x ] eye irritation [ ] postnasal drip [ ] nasal congestion  CV: [x ] negative  [ ] chest pain [ ] orthopnea [ ] palpitations [ ] murmur  Resp: [ ] negative [x ] cough [x ] shortness of breath [x ] dyspnea [ ] wheezing [ ] sputum [ ] hemoptysis  GI: [ ] negative [x ] nausea [x ] vomiting [ ] diarrhea [ ] constipation [ ] abd pain [ ] dysphagia   : [x ] negative [ ] dysuria [ ] nocturia [ ] hematuria [ ] increased urinary frequency  Musculoskeletal: [x ] negative [ ] back pain [ ] myalgias [ ] arthralgias [ ] fracture  Skin: [ ] negative [x ] rash [x ] itch  Neurological: [x ] negative [ ] headache [ ] dizziness [ ] syncope [ ] weakness [ ] numbness  Psychiatric: [x ] negative [ ] anxiety [ ] depression  Endocrine: [ ] negative [ ] diabetes [x ] thyroid problem  Hematologic/Lymphatic: [x ] negative [ ] anemia [ ] bleeding problem  Allergic/Immunologic: [ ] negative [ ] itchy eyes [ ] nasal discharge [x ] hives [x ] angioedema  [x ] All other systems negative  [ ] Unable to assess ROS because ________    OBJECTIVE:  ICU Vital Signs Last 24 Hrs  HR: 154 (03 Mar 2019 21:55) (154 - 170)  BP: 96/81 (03 Mar 2019 21:55) (96/81 - 114/74)  RR: 22 (03 Mar 2019 21:55) (22 - 24)  SpO2: 98% (03 Mar 2019 21:55) (95% - 98%)        CAPILLARY BLOOD GLUCOSE  POCT Blood Glucose.: 133 mg/dL (03 Mar 2019 21:09)      PHYSICAL EXAM:  General: NAD, speaking in full sentences, obese  HEENT: scleral injection, tongue swelling  Lymph Nodes: no LAD  Neck: no JVD  Respiratory: scattered wheezes, moving air well to bases bilaterally  Cardiovascular: tachycardic, irregularly irregular  Abdomen: obese, soft, BS+  Extremities: trace LE edema, no cyanosis/clubbing  Skin: resolving, scattered blanchable, erythematous hives primarily involving face and neck  Neurological: AAOx3, nonfocal, moving all extremities  Psychiatry: normal affect    LINES:     HOSPITAL MEDICATIONS:  EPINEPHrine     1 mG/mL Injectable 0.3 milliGRAM(s) IntraMuscular once  methylPREDNISolone sodium succinate Injectable 125 milliGRAM(s) IV Push Once  diphenhydrAMINE   Injectable 50 milliGRAM(s) IntraMuscular every 4 hours PRN  ondansetron Injectable 4 milliGRAM(s) IV Push once  lactated ringers Bolus 1000 milliLiter(s) IV Bolus once      LABS: Pending      MICROBIOLOGY:     RADIOLOGY:  [ ] Reviewed and interpreted by me    EKG:

## 2019-03-03 NOTE — ED PROVIDER NOTE - OBJECTIVE STATEMENT
59 yo male with no hx of allergies or anaphylaxis, h/o afib on cardizem at home, presents to the ED for anaphylactic reaction, BIBA. Pt s/p epi x 1 IM by EMS with minimal improvement of symptoms, + lip and tongue swelling. Able to speak in the ED but with difficulty 2/2 facial distension. Denied CP. + urticarial rash diffusely. Pt and wife state symptoms began over several minutes after eating shrimp for dinner around 7pm. Pt told his wife his scalp was itchy and took a shower hoping that would help but developed rash and facial swelling. 59 yo male with no hx of allergies or anaphylaxis, h/o afib on cardizem at home, presents to the ED for anaphylactic reaction, BIBA. Pt s/p epi x 1 IM by EMS with minimal improvement of symptoms, + lip and tongue swelling. Able to speak in the ED but with difficulty 2/2 facial distension. Denied CP. + urticarial rash diffusely. Pt and wife state symptoms began over several minutes after eating shrimp for dinner around 7pm. Pt told his wife his scalp was itchy and took a shower hoping that would help but developed rash and facial swelling..

## 2019-03-04 DIAGNOSIS — T78.2XXA ANAPHYLACTIC SHOCK, UNSPECIFIED, INITIAL ENCOUNTER: ICD-10-CM

## 2019-03-04 DIAGNOSIS — E87.1 HYPO-OSMOLALITY AND HYPONATREMIA: ICD-10-CM

## 2019-03-04 DIAGNOSIS — E03.9 HYPOTHYROIDISM, UNSPECIFIED: ICD-10-CM

## 2019-03-04 DIAGNOSIS — I48.91 UNSPECIFIED ATRIAL FIBRILLATION: ICD-10-CM

## 2019-03-04 DIAGNOSIS — Z29.9 ENCOUNTER FOR PROPHYLACTIC MEASURES, UNSPECIFIED: ICD-10-CM

## 2019-03-04 DIAGNOSIS — J45.909 UNSPECIFIED ASTHMA, UNCOMPLICATED: ICD-10-CM

## 2019-03-04 LAB
ALBUMIN SERPL ELPH-MCNC: 3.5 G/DL — SIGNIFICANT CHANGE UP (ref 3.3–5)
ALP SERPL-CCNC: 92 U/L — SIGNIFICANT CHANGE UP (ref 40–120)
ALT FLD-CCNC: 15 U/L — SIGNIFICANT CHANGE UP (ref 4–41)
ANION GAP SERPL CALC-SCNC: 15 MMO/L — HIGH (ref 7–14)
ANION GAP SERPL CALC-SCNC: 15 MMO/L — HIGH (ref 7–14)
AST SERPL-CCNC: 13 U/L — SIGNIFICANT CHANGE UP (ref 4–40)
BASE EXCESS BLDV CALC-SCNC: -1.7 MMOL/L — SIGNIFICANT CHANGE UP
BASOPHILS # BLD AUTO: 0.02 K/UL — SIGNIFICANT CHANGE UP (ref 0–0.2)
BASOPHILS NFR BLD AUTO: 0.2 % — SIGNIFICANT CHANGE UP (ref 0–2)
BILIRUB SERPL-MCNC: 0.3 MG/DL — SIGNIFICANT CHANGE UP (ref 0.2–1.2)
BLOOD GAS VENOUS - CREATININE: 1 MG/DL — SIGNIFICANT CHANGE UP (ref 0.5–1.3)
BUN SERPL-MCNC: 16 MG/DL — SIGNIFICANT CHANGE UP (ref 7–23)
BUN SERPL-MCNC: 17 MG/DL — SIGNIFICANT CHANGE UP (ref 7–23)
CALCIUM SERPL-MCNC: 9 MG/DL — SIGNIFICANT CHANGE UP (ref 8.4–10.5)
CALCIUM SERPL-MCNC: 9.3 MG/DL — SIGNIFICANT CHANGE UP (ref 8.4–10.5)
CHLORIDE BLDV-SCNC: 102 MMOL/L — SIGNIFICANT CHANGE UP (ref 96–108)
CHLORIDE SERPL-SCNC: 96 MMOL/L — LOW (ref 98–107)
CHLORIDE SERPL-SCNC: 96 MMOL/L — LOW (ref 98–107)
CO2 SERPL-SCNC: 22 MMOL/L — SIGNIFICANT CHANGE UP (ref 22–31)
CO2 SERPL-SCNC: 23 MMOL/L — SIGNIFICANT CHANGE UP (ref 22–31)
CREAT SERPL-MCNC: 1.11 MG/DL — SIGNIFICANT CHANGE UP (ref 0.5–1.3)
CREAT SERPL-MCNC: 1.17 MG/DL — SIGNIFICANT CHANGE UP (ref 0.5–1.3)
EOSINOPHIL # BLD AUTO: 0 K/UL — SIGNIFICANT CHANGE UP (ref 0–0.5)
EOSINOPHIL NFR BLD AUTO: 0 % — SIGNIFICANT CHANGE UP (ref 0–6)
GAS PNL BLDV: 130 MMOL/L — LOW (ref 136–146)
GLUCOSE BLDV-MCNC: 150 — HIGH (ref 70–99)
GLUCOSE SERPL-MCNC: 139 MG/DL — HIGH (ref 70–99)
GLUCOSE SERPL-MCNC: 217 MG/DL — HIGH (ref 70–99)
HCO3 BLDV-SCNC: 22 MMOL/L — SIGNIFICANT CHANGE UP (ref 20–27)
HCT VFR BLD CALC: 50.7 % — HIGH (ref 39–50)
HCT VFR BLDV CALC: 49.3 % — SIGNIFICANT CHANGE UP (ref 39–51)
HCV AB S/CO SERPL IA: 0.09 S/CO — SIGNIFICANT CHANGE UP (ref 0–0.79)
HCV AB SERPL-IMP: SIGNIFICANT CHANGE UP
HGB BLD-MCNC: 15.9 G/DL — SIGNIFICANT CHANGE UP (ref 13–17)
HGB BLDV-MCNC: 16.1 G/DL — SIGNIFICANT CHANGE UP (ref 13–17)
IMM GRANULOCYTES NFR BLD AUTO: 0.7 % — SIGNIFICANT CHANGE UP (ref 0–1.5)
LACTATE BLDV-MCNC: 1.4 MMOL/L — SIGNIFICANT CHANGE UP (ref 0.5–2)
LYMPHOCYTES # BLD AUTO: 0.66 K/UL — LOW (ref 1–3.3)
LYMPHOCYTES # BLD AUTO: 5.4 % — LOW (ref 13–44)
MAGNESIUM SERPL-MCNC: 2.1 MG/DL — SIGNIFICANT CHANGE UP (ref 1.6–2.6)
MCHC RBC-ENTMCNC: 27.8 PG — SIGNIFICANT CHANGE UP (ref 27–34)
MCHC RBC-ENTMCNC: 31.4 % — LOW (ref 32–36)
MCV RBC AUTO: 88.6 FL — SIGNIFICANT CHANGE UP (ref 80–100)
MONOCYTES # BLD AUTO: 0.05 K/UL — SIGNIFICANT CHANGE UP (ref 0–0.9)
MONOCYTES NFR BLD AUTO: 0.4 % — LOW (ref 2–14)
NEUTROPHILS # BLD AUTO: 11.37 K/UL — HIGH (ref 1.8–7.4)
NEUTROPHILS NFR BLD AUTO: 93.3 % — HIGH (ref 43–77)
NRBC # FLD: 0 K/UL — LOW (ref 25–125)
PCO2 BLDV: 50 MMHG — SIGNIFICANT CHANGE UP (ref 41–51)
PH BLDV: 7.3 PH — LOW (ref 7.32–7.43)
PHOSPHATE SERPL-MCNC: 4 MG/DL — SIGNIFICANT CHANGE UP (ref 2.5–4.5)
PLATELET # BLD AUTO: 360 K/UL — SIGNIFICANT CHANGE UP (ref 150–400)
PMV BLD: 9.8 FL — SIGNIFICANT CHANGE UP (ref 7–13)
PO2 BLDV: 72 MMHG — HIGH (ref 35–40)
POTASSIUM BLDV-SCNC: 4.9 MMOL/L — HIGH (ref 3.4–4.5)
POTASSIUM SERPL-MCNC: 5.1 MMOL/L — SIGNIFICANT CHANGE UP (ref 3.5–5.3)
POTASSIUM SERPL-MCNC: 5.6 MMOL/L — HIGH (ref 3.5–5.3)
POTASSIUM SERPL-SCNC: 5.1 MMOL/L — SIGNIFICANT CHANGE UP (ref 3.5–5.3)
POTASSIUM SERPL-SCNC: 5.6 MMOL/L — HIGH (ref 3.5–5.3)
PROT SERPL-MCNC: 6.9 G/DL — SIGNIFICANT CHANGE UP (ref 6–8.3)
RBC # BLD: 5.72 M/UL — SIGNIFICANT CHANGE UP (ref 4.2–5.8)
RBC # FLD: 13.8 % — SIGNIFICANT CHANGE UP (ref 10.3–14.5)
SAO2 % BLDV: 92.1 % — HIGH (ref 60–85)
SODIUM SERPL-SCNC: 133 MMOL/L — LOW (ref 135–145)
SODIUM SERPL-SCNC: 134 MMOL/L — LOW (ref 135–145)
WBC # BLD: 12.19 K/UL — HIGH (ref 3.8–10.5)
WBC # FLD AUTO: 12.19 K/UL — HIGH (ref 3.8–10.5)

## 2019-03-04 PROCEDURE — 12345: CPT | Mod: NC

## 2019-03-04 PROCEDURE — 99223 1ST HOSP IP/OBS HIGH 75: CPT | Mod: GC

## 2019-03-04 RX ORDER — DILTIAZEM HCL 120 MG
180 CAPSULE, EXT RELEASE 24 HR ORAL DAILY
Qty: 0 | Refills: 0 | Status: DISCONTINUED | OUTPATIENT
Start: 2019-03-04 | End: 2019-03-04

## 2019-03-04 RX ORDER — BUDESONIDE AND FORMOTEROL FUMARATE DIHYDRATE 160; 4.5 UG/1; UG/1
2 AEROSOL RESPIRATORY (INHALATION)
Qty: 0 | Refills: 0 | Status: DISCONTINUED | OUTPATIENT
Start: 2019-03-04 | End: 2019-03-05

## 2019-03-04 RX ORDER — DILTIAZEM HCL 120 MG
180 CAPSULE, EXT RELEASE 24 HR ORAL DAILY
Qty: 0 | Refills: 0 | Status: DISCONTINUED | OUTPATIENT
Start: 2019-03-04 | End: 2019-03-05

## 2019-03-04 RX ORDER — INFLUENZA VIRUS VACCINE 15; 15; 15; 15 UG/.5ML; UG/.5ML; UG/.5ML; UG/.5ML
0.5 SUSPENSION INTRAMUSCULAR ONCE
Qty: 0 | Refills: 0 | Status: DISCONTINUED | OUTPATIENT
Start: 2019-03-04 | End: 2019-03-05

## 2019-03-04 RX ORDER — LEVALBUTEROL 1.25 MG/.5ML
0.63 SOLUTION, CONCENTRATE RESPIRATORY (INHALATION) EVERY 6 HOURS
Qty: 0 | Refills: 0 | Status: DISCONTINUED | OUTPATIENT
Start: 2019-03-04 | End: 2019-03-05

## 2019-03-04 RX ORDER — ASPIRIN/CALCIUM CARB/MAGNESIUM 324 MG
81 TABLET ORAL DAILY
Qty: 0 | Refills: 0 | Status: DISCONTINUED | OUTPATIENT
Start: 2019-03-04 | End: 2019-03-05

## 2019-03-04 RX ORDER — FAMOTIDINE 10 MG/ML
20 INJECTION INTRAVENOUS
Qty: 0 | Refills: 0 | Status: DISCONTINUED | OUTPATIENT
Start: 2019-03-04 | End: 2019-03-05

## 2019-03-04 RX ORDER — THYROID 120 MG
60 TABLET ORAL DAILY
Qty: 0 | Refills: 0 | Status: DISCONTINUED | OUTPATIENT
Start: 2019-03-04 | End: 2019-03-05

## 2019-03-04 RX ADMIN — BUDESONIDE AND FORMOTEROL FUMARATE DIHYDRATE 2 PUFF(S): 160; 4.5 AEROSOL RESPIRATORY (INHALATION) at 10:01

## 2019-03-04 RX ADMIN — LEVALBUTEROL 0.63 MILLIGRAM(S): 1.25 SOLUTION, CONCENTRATE RESPIRATORY (INHALATION) at 03:54

## 2019-03-04 RX ADMIN — BUDESONIDE AND FORMOTEROL FUMARATE DIHYDRATE 2 PUFF(S): 160; 4.5 AEROSOL RESPIRATORY (INHALATION) at 21:58

## 2019-03-04 RX ADMIN — LEVALBUTEROL 0.63 MILLIGRAM(S): 1.25 SOLUTION, CONCENTRATE RESPIRATORY (INHALATION) at 17:07

## 2019-03-04 RX ADMIN — FAMOTIDINE 20 MILLIGRAM(S): 10 INJECTION INTRAVENOUS at 12:52

## 2019-03-04 RX ADMIN — Medication 81 MILLIGRAM(S): at 12:52

## 2019-03-04 RX ADMIN — FAMOTIDINE 20 MILLIGRAM(S): 10 INJECTION INTRAVENOUS at 23:19

## 2019-03-04 RX ADMIN — Medication 180 MILLIGRAM(S): at 03:13

## 2019-03-04 RX ADMIN — LEVALBUTEROL 0.63 MILLIGRAM(S): 1.25 SOLUTION, CONCENTRATE RESPIRATORY (INHALATION) at 22:00

## 2019-03-04 RX ADMIN — Medication 40 MILLIGRAM(S): at 12:52

## 2019-03-04 RX ADMIN — Medication 60 MILLIGRAM(S): at 10:01

## 2019-03-04 RX ADMIN — LEVALBUTEROL 0.63 MILLIGRAM(S): 1.25 SOLUTION, CONCENTRATE RESPIRATORY (INHALATION) at 10:04

## 2019-03-04 NOTE — ED ADULT NURSE REASSESSMENT NOTE - GENERAL PATIENT STATE
comfortable appearance/resting/sleeping/family/SO at bedside/smiling/interactive
improvement verbalized/smiling/interactive/family/SO at bedside

## 2019-03-04 NOTE — CHART NOTE - NSCHARTNOTEFT_GEN_A_CORE
pt a/w allergic rxn suspected to shellfish c/b afib with RVR  seen by ENT and MICU  clinically improved  still with HR in 120s  some hypoxia on RA 89%, low 90s with NC  pt does not use O2 at home  CHEST: no wheeze  HEAD: lips seem swollen and tongue appears big, but speech clear, no drooling    add pepcid, solumedrol for allergic rxn as not completely resolved  anticipate HR will improve once rxn more controlled  cont to monitor on tele  cont supplemental O2 to keep sats greater than 90%

## 2019-03-04 NOTE — H&P ADULT - PROBLEM SELECTOR PLAN 1
- Lactate 3.0.   - MICU and ENT recs appreciated.   - s/p Epi x 2, Solumedrol and benadryl , with improvement.   - Oxygen delivery therapy   - Will consider xopenex for respiratory treatment - Lactate 3.0.   - MICU and ENT recs appreciated.   - s/p Epi x 2, Solumedrol and benadryl , with improvement.   - Oxygen delivery therapy   - Will consider xopenex for respiratory treatment  - Epi pen and allergy referral upon discharge. - Lactate 3.0.   - MICU and ENT recs appreciated.   - s/p Epi x 2, Solumedrol and benadryl , with improvement.   - Oxygen delivery therapy   - C/w  xopenex for respiratory treatment  - Epi pen and allergy referral upon discharge. - Likely due to new shrimp allergy.  Lactate 3.0, repeat with am labs.   - MICU and ENT recs appreciated.   - s/p Epi x 2, Solumedrol and benadryl , with improvement.   - Oxygen delivery therapy   - C/w  xopenex for respiratory treatment  - Epi pen and allergy referral upon discharge.

## 2019-03-04 NOTE — H&P ADULT - FAMILY HISTORY
Mother  Still living? Unknown  Family history of hypertension, Age at diagnosis: Age Unknown  Family history of diabetes mellitus, Age at diagnosis: Age Unknown  Family history of coronary artery disease, Age at diagnosis: Age Unknown

## 2019-03-04 NOTE — H&P ADULT - NSHPLABSRESULTS_GEN_ALL_CORE
17.3   9.72  )-----------( 514      ( 03 Mar 2019 22:12 )             54.9     Hgb Trend: 17.3<--  03-03    134<L>  |  96<L>  |  16  ----------------------------<  122<H>  4.4   |  19<L>  |  1.18    Ca    9.5      03 Mar 2019 22:12    TPro  7.4  /  Alb  3.7  /  TBili  0.5  /  DBili  x   /  AST  16  /  ALT  15  /  AlkPhos  100  03-03    Creatinine Trend: 1.18<-- 17.3   9.72  )-----------( 514      ( 03 Mar 2019 22:12 )             54.9     Hgb Trend: 17.3<--  03-03    134<L>  |  96<L>  |  16  ----------------------------<  122<H>  4.4   |  19<L>  |  1.18    Ca    9.5      03 Mar 2019 22:12    TPro  7.4  /  Alb  3.7  /  TBili  0.5  /  DBili  x   /  AST  16  /  ALT  15  /  AlkPhos  100  03-03    Creatinine Trend: 1.18<--    EKG - Afib w/ RVR  QTc 435 17.3   9.72  )-----------( 514      ( 03 Mar 2019 22:12 )             54.9     Hgb Trend: 17.3<--  03-03    134<L>  |  96<L>  |  16  ----------------------------<  122<H>  4.4   |  19<L>  |  1.18    Ca    9.5      03 Mar 2019 22:12    TPro  7.4  /  Alb  3.7  /  TBili  0.5  /  DBili  x   /  AST  16  /  ALT  15  /  AlkPhos  100  03-03    Creatinine Trend: 1.18<--    EKG personally reviewed, Afib w/ RVR  QTc 435

## 2019-03-04 NOTE — H&P ADULT - ASSESSMENT
59yo M with PMH of AFib (no AC), Hypothyroidism, and Asthma (no previous intubations) presenting with shortness of breath, tongue swelling, and hives after dinner. Patient ate shrimp (prepared by his wife) around 6/7pm and then noticed that his scalp was itchy, which progressed to widespread hives over his face and neck.

## 2019-03-04 NOTE — ED ADULT NURSE REASSESSMENT NOTE - NS ED NURSE REASSESS COMMENT FT1
Pt ambulates to the bathroom with steady gait.
Pt asleep, easily arousable to verbal stimuli, Breathing even and unlabored, Will continue to monitor.
Pt states he feels he is feeling better, states "he feels his tongue is less large" Pt breathing well at this time. Speaking to wife at bedside. NAD. Will continue to monitor.
break coverage: pt awake and alert. breathing unlabored on O2 NC. reports improvement in tongue swelling, able to speak in full sentences. upper lip still swollen. admitting MD at bedside for eval, aware of heart rate.
Pt now able to talk in complete sentences, hives appear to be improving, tongue swelling improved. Pt st" Now only 1/2 of the tongue is swollen...I feel much better now." vs as noted.

## 2019-03-04 NOTE — H&P ADULT - PROBLEM SELECTOR PLAN 2
Found with Afib w/ RVR in the ED. s/p IV cardizem 10mg x 2. - Found with Afib w/ RVR in the ED. s/p IV cardizem 10mg x 2.  - - Found with Afib w/ RVR in the ED. s/p IV cardizem 10mg x 2.  - C/w cardizem IV push if HR >110   - Restart PO Cardizem if able to tolerate PO.  - Monitor on telemetry

## 2019-03-04 NOTE — H&P ADULT - NSHPPHYSICALEXAM_GEN_ALL_CORE
VITAL SIGNS (Last 24 hrs):  T(C): 37.1 (03-04-19 @ 00:52), Max: 37.1 (03-04-19 @ 00:52)  HR: 105 (03-04-19 @ 00:52) (105 - 170)  BP: 117/87 (03-04-19 @ 00:52) (96/81 - 132/90)  RR: 18 (03-04-19 @ 00:52) (18 - 24)  SpO2: 95% (03-04-19 @ 00:52) (94% - 98%)    I&O's Summary VITAL SIGNS (Last 24 hrs):  T(C): 37.1 (03-04-19 @ 00:52), Max: 37.1 (03-04-19 @ 00:52)  HR: 105 (03-04-19 @ 00:52) (105 - 170)  BP: 117/87 (03-04-19 @ 00:52) (96/81 - 132/90)  RR: 18 (03-04-19 @ 00:52) (18 - 24)  SpO2: 95% (03-04-19 @ 00:52) (94% - 98%)    I&O's Summary    GENERAL: NAD, well-developed  HEAD:  Atraumatic, Normocephalic  EYES: EOMI, conjunctiva and sclera clear  NECK: Supple, No JVD  CHEST/LUNG: Mild expiratory wheezes.   HEART: Regular rate and rhythm; normal S1 S2,  No murmurs, rubs, or gallops  ABDOMEN: Soft, Nontender, Nondistended; Bowel sounds normal  EXTREMITIES:  2+ Peripheral Pulses, No clubbing, cyanosis, or edema  PSYCH: AAOx3, No acute distress   NEUROLOGY: non-focal  SKIN: No rashes or lesions VITAL SIGNS (Last 24 hrs):  T(C): 37.1 (03-04-19 @ 00:52), Max: 37.1 (03-04-19 @ 00:52)  HR: 105 (03-04-19 @ 00:52) (105 - 170)  BP: 117/87 (03-04-19 @ 00:52) (96/81 - 132/90)  RR: 18 (03-04-19 @ 00:52) (18 - 24)  SpO2: 95% (03-04-19 @ 00:52) (94% - 98%)    I&O's Summary    GENERAL: NAD, well-developed  HEENT: EOMI, conjunctiva and sclera clear, normal external ears and nose, normal hearing, moist oral mucosa  NECK: Supple, No JVD  CHEST/LUNG: Mild expiratory wheezes, normal respiratory effort  HEART: Regular rate and rhythm; normal S1 S2,  No murmurs, rubs, or gallops  ABDOMEN: Soft, Nontender, Nondistended; Bowel sounds normal  EXTREMITIES:  2+ Peripheral Pulses, No clubbing, cyanosis, or edema  PSYCH: AOX3, appropriate mood, affect  NEUROLOGY: sensation grossly intact, CN grossly intact, non-focal exam  SKIN: warm, dry, no rashes or lesions

## 2019-03-04 NOTE — H&P ADULT - PROBLEM SELECTOR PLAN 5
- Unclear etiology  - Monitor on BMP - Unclear etiology  - Monitor on BMP  - Restart diet once able to tolerate PO.

## 2019-03-04 NOTE — H&P ADULT - HISTORY OF PRESENT ILLNESS
57yo M with PMH of AFib (no AC), Hypothyroidism, and Asthma (no previous intubations) presenting with shortness of breath, tongue swelling, and hives after dinner. Patient ate shrimp (prepared by his wife) around 6/7pm and then noticed that his scalp was itchy, which progressed to widespread hives over his face and neck. Subsquently he developed SOB and tongue swelling and felt his throat closing. EMS arrived, gave EPI x 1. Upon arrival to the ED, received EPI, Solumedrol and Benadryl. Patient vomited x1 and felt better. Patient endorsed no previous allergies to shrimp. No hx of drug allergies.      In the ED, initial VS , /62 RR 24 Saturating 95% on supplemental O2. Patient was found with afib w/ RVR s/p cardizem 10 x 2. 57yo M with PMH of AFib (no AC), Hypothyroidism, and Asthma (no previous intubations) presenting with shortness of breath, tongue swelling, and hives after dinner. Patient ate shrimp (prepared by his wife) around 6/7pm and then noticed that his scalp was itchy, which progressed to widespread hives over his face and neck. Subsquently he developed SOB and tongue swelling and felt his throat closing. EMS arrived, gave EPI x 1. Upon arrival to the ED, received EPI, Solumedrol and Benadryl. Patient vomited x1 and felt better. Patient endorsed no previous allergies to shrimp. No hx of drug allergies.  Patient endorses >40 lb weight loss, intentional, following a strict diet. Check TSH ever 3 months.     In the ED, initial VS , /62 RR 24 Saturating 95% on supplemental O2. Patient was found with afib w/ RVR s/p cardizem 10 x 2. 59yo M with PMH of AFib (no AC), Hypothyroidism, and Asthma (no previous intubations) presenting with shortness of breath, tongue swelling, and hives after dinner. Patient ate shrimp (prepared by his wife) around 6/7pm and then noticed that his scalp was itchy, which progressed to widespread hives over his face and neck. Subsquently he developed SOB and tongue swelling and felt his throat closing. EMS arrived, gave EPI x 1. Upon arrival to the ED, received EPI, Solumedrol and Benadryl. Patient vomited x1 and felt better. Patient endorsed no previous allergies to shrimp. No change in how wife prepared his shrimp. No hx of drug allergies.  Patient endorses >40 lb weight loss, intentional, following a strict diet. Check TSH ever 3 months.     In the ED, initial VS , /62 RR 24 Saturating 95% on supplemental O2. Patient was found with afib w/ RVR s/p cardizem 10 x 2.

## 2019-03-04 NOTE — PATIENT PROFILE ADULT - PRO INTERPRETER NEED 2
Spoke to patient and informed that bacteria was growing in the urine and a antibiotic was send to the pharmacy. Patient voices understanding.   English

## 2019-03-05 ENCOUNTER — TRANSCRIPTION ENCOUNTER (OUTPATIENT)
Age: 59
End: 2019-03-05

## 2019-03-05 VITALS
OXYGEN SATURATION: 96 % | RESPIRATION RATE: 18 BRPM | TEMPERATURE: 98 F | DIASTOLIC BLOOD PRESSURE: 65 MMHG | HEART RATE: 98 BPM | SYSTOLIC BLOOD PRESSURE: 116 MMHG

## 2019-03-05 LAB
ALBUMIN SERPL ELPH-MCNC: 3.8 G/DL — SIGNIFICANT CHANGE UP (ref 3.3–5)
ALP SERPL-CCNC: 87 U/L — SIGNIFICANT CHANGE UP (ref 40–120)
ALT FLD-CCNC: 14 U/L — SIGNIFICANT CHANGE UP (ref 4–41)
ANION GAP SERPL CALC-SCNC: 15 MMO/L — HIGH (ref 7–14)
AST SERPL-CCNC: 9 U/L — SIGNIFICANT CHANGE UP (ref 4–40)
BILIRUB SERPL-MCNC: 0.3 MG/DL — SIGNIFICANT CHANGE UP (ref 0.2–1.2)
BUN SERPL-MCNC: 20 MG/DL — SIGNIFICANT CHANGE UP (ref 7–23)
CALCIUM SERPL-MCNC: 9.3 MG/DL — SIGNIFICANT CHANGE UP (ref 8.4–10.5)
CHLORIDE SERPL-SCNC: 98 MMOL/L — SIGNIFICANT CHANGE UP (ref 98–107)
CO2 SERPL-SCNC: 22 MMOL/L — SIGNIFICANT CHANGE UP (ref 22–31)
CREAT SERPL-MCNC: 1.05 MG/DL — SIGNIFICANT CHANGE UP (ref 0.5–1.3)
GLUCOSE SERPL-MCNC: 133 MG/DL — HIGH (ref 70–99)
HCT VFR BLD CALC: 45.9 % — SIGNIFICANT CHANGE UP (ref 39–50)
HGB BLD-MCNC: 14.4 G/DL — SIGNIFICANT CHANGE UP (ref 13–17)
MAGNESIUM SERPL-MCNC: 2.4 MG/DL — SIGNIFICANT CHANGE UP (ref 1.6–2.6)
MCHC RBC-ENTMCNC: 28 PG — SIGNIFICANT CHANGE UP (ref 27–34)
MCHC RBC-ENTMCNC: 31.4 % — LOW (ref 32–36)
MCV RBC AUTO: 89.1 FL — SIGNIFICANT CHANGE UP (ref 80–100)
NRBC # FLD: 0 K/UL — LOW (ref 25–125)
PHOSPHATE SERPL-MCNC: 3.1 MG/DL — SIGNIFICANT CHANGE UP (ref 2.5–4.5)
PLATELET # BLD AUTO: 378 K/UL — SIGNIFICANT CHANGE UP (ref 150–400)
PMV BLD: 10 FL — SIGNIFICANT CHANGE UP (ref 7–13)
POTASSIUM SERPL-MCNC: 4.6 MMOL/L — SIGNIFICANT CHANGE UP (ref 3.5–5.3)
POTASSIUM SERPL-SCNC: 4.6 MMOL/L — SIGNIFICANT CHANGE UP (ref 3.5–5.3)
PROT SERPL-MCNC: 7 G/DL — SIGNIFICANT CHANGE UP (ref 6–8.3)
RBC # BLD: 5.15 M/UL — SIGNIFICANT CHANGE UP (ref 4.2–5.8)
RBC # FLD: 14 % — SIGNIFICANT CHANGE UP (ref 10.3–14.5)
SODIUM SERPL-SCNC: 135 MMOL/L — SIGNIFICANT CHANGE UP (ref 135–145)
WBC # BLD: 13.74 K/UL — HIGH (ref 3.8–10.5)
WBC # FLD AUTO: 13.74 K/UL — HIGH (ref 3.8–10.5)

## 2019-03-05 PROCEDURE — 99239 HOSP IP/OBS DSCHRG MGMT >30: CPT

## 2019-03-05 RX ORDER — DILTIAZEM HCL 120 MG
1 CAPSULE, EXT RELEASE 24 HR ORAL
Qty: 30 | Refills: 0
Start: 2019-03-05 | End: 2019-04-03

## 2019-03-05 RX ORDER — DIPHENHYDRAMINE HCL 50 MG
2 CAPSULE ORAL
Qty: 24 | Refills: 0
Start: 2019-03-05 | End: 2019-03-07

## 2019-03-05 RX ORDER — EPINEPHRINE 0.3 MG/.3ML
0.3 INJECTION INTRAMUSCULAR; SUBCUTANEOUS
Qty: 0.6 | Refills: 0
Start: 2019-03-05 | End: 2019-03-05

## 2019-03-05 RX ORDER — FAMOTIDINE 10 MG/ML
1 INJECTION INTRAVENOUS
Qty: 6 | Refills: 0
Start: 2019-03-05 | End: 2019-03-07

## 2019-03-05 RX ADMIN — Medication 180 MILLIGRAM(S): at 06:51

## 2019-03-05 RX ADMIN — FAMOTIDINE 20 MILLIGRAM(S): 10 INJECTION INTRAVENOUS at 05:41

## 2019-03-05 RX ADMIN — Medication 40 MILLIGRAM(S): at 05:41

## 2019-03-05 RX ADMIN — LEVALBUTEROL 0.63 MILLIGRAM(S): 1.25 SOLUTION, CONCENTRATE RESPIRATORY (INHALATION) at 05:01

## 2019-03-05 RX ADMIN — LEVALBUTEROL 0.63 MILLIGRAM(S): 1.25 SOLUTION, CONCENTRATE RESPIRATORY (INHALATION) at 09:52

## 2019-03-05 RX ADMIN — BUDESONIDE AND FORMOTEROL FUMARATE DIHYDRATE 2 PUFF(S): 160; 4.5 AEROSOL RESPIRATORY (INHALATION) at 09:52

## 2019-03-05 RX ADMIN — Medication 60 MILLIGRAM(S): at 06:51

## 2019-03-05 RX ADMIN — Medication 81 MILLIGRAM(S): at 11:11

## 2019-03-05 NOTE — DISCHARGE NOTE NURSING/CASE MANAGEMENT/SOCIAL WORK - NSDCPEPT PROEDHF_GEN_ALL_CORE
Monitor weight daily/Low salt diet/Report signs and symptoms to primary care provider/Call primary care provider for follow up after discharge/Activities as tolerated

## 2019-03-05 NOTE — CHART NOTE - NSCHARTNOTEFT_GEN_A_CORE
pt for dc today  feeling much better  mouth swelling completely resolved  no SOB  VSS  CHEST: good AE b/l; no wheeze  HEART: reg  ABD: +BS soft obese  pt admitted with allergic reaction to shrimp c/b afib with RVR  reaction now resolved  o2 sat on RA 95% checked by me  d/w pt importance of avoiding shellfish/shrimp  d/w about epi pen to be carried at all times and if mouth swelling starts again to use the pen and call 911  will f/u with allergy as outpt  37 min spent with dc planning  pt takes asa for afib

## 2019-03-05 NOTE — DISCHARGE NOTE NURSING/CASE MANAGEMENT/SOCIAL WORK - NSDCDPATPORTLINK_GEN_ALL_CORE
You can access the [facility] Patient Portal, offered by [facility], by registering with the following website: [email or web address]

## 2019-03-05 NOTE — DISCHARGE NOTE PROVIDER - NSDCCPCAREPLAN_GEN_ALL_CORE_FT
PRINCIPAL DISCHARGE DIAGNOSIS  Problem: Anaphylaxis  Assessment and Plan of Treatment: Avoid eating shrimp!  epi pens were sent to pharmacy. Continue prednisone, benadryl, and pepcid x3 days. Follow up with Allergist.      SECONDARY DISCHARGE DIAGNOSES  Problem: Atrial fibrillation with rapid ventricular response  Assessment and Plan of Treatment: Continue diltiazem and aspirin as prescribed, follow up with cardiology    Problem: Hypothyroidism  Assessment and Plan of Treatment: Continue thyroid medications

## 2019-03-05 NOTE — DISCHARGE NOTE PROVIDER - HOSPITAL COURSE
57yo M with PMH of AFib (no AC), Hypothyroidism, and Asthma (no previous intubations) presenting with shortness of breath, tongue swelling, and hives after dinner. Patient ate shrimp (prepared by his wife) around 6/7pm and then noticed that his scalp was itchy, which progressed to widespread hives over his face and neck.        + Anaphylaxis - Likely due to new shrimp allergy.  Lactate 3.0 >1.4    - MICU eval: not candidate for ICU level of care     - ENT eval: No laryngeal involvement, continue benadryl,  no concern for acute airway compromise, no acute ent intervention    - s/p Epi x 2, Solumedrol and benadryl, with improvement.     - C/w xopenex for respiratory treatment    - Epi pen and allergy referral upon discharge         + Atrial fibrillation - Found with Afib w/ RVR in the ED. s/p IV cardizem 10mg x 2. Now rate controlled on PO diltizam         + Hypothyroidism - C/w thyroid medication         + Asthma - C/w xopenex q6hr for wheezing.         + Hyponatremia - Unclear etiology, now resolved         Discussed with Dr. Gibson pt stable for discharge home. 57yo M with PMH of AFib (no AC), Hypothyroidism, and Asthma (no previous intubations) presenting with shortness of breath, tongue swelling, and hives after dinner. Patient ate shrimp (prepared by his wife) around 6/7pm and then noticed that his scalp was itchy, which progressed to widespread hives over his face and neck.        + Anaphylaxis - Likely due to new shrimp allergy.  Lactate 3.0 >1.4    - MICU eval: not candidate for ICU level of care     - ENT eval: No laryngeal involvement, continue benadryl,  no concern for acute airway compromise, no acute ent intervention    - s/p Epi x 2, Solumedrol, pepcid, and benadryl, with improvement.     - C/w xopenex for respiratory treatment    - Epi pen and allergy referral upon discharge         + Atrial fibrillation - Found with Afib w/ RVR in the ED. s/p IV cardizem 10mg x 2. Now rate controlled on PO diltizam         + Hypothyroidism - C/w thyroid medication         + Asthma - C/w xopenex q6hr for wheezing.         + Hyponatremia - Unclear etiology, now resolved         Discussed with Dr. Gibson pt stable for discharge home.

## 2019-09-27 NOTE — ED ADULT NURSE NOTE - OBJECTIVE STATEMENT
Patient:   MALACHI NEWBERRY            MRN: [id number]            FIN: [id number]              Age:   73 years     Sex:  MALE     :  [birth year]   Associated Diagnoses:   None   Author:   BLANCA RODRÍGUEZ. Tolerating CPAP, not tolerating sedation vacation.         Physical Examination   VS/Measurements     Vitals between:   2019 11:55:37   TO   2019 11:55:37                   LAST RESULT MINIMUM MAXIMUM  Temperature 37.3 36.3 37.6  Heart Rate 100 55 100  Respiratory Rate 29 13 29  NISBP           136 91 160  NIDBP           56 40 83  NIMBP           77 52 93  SpO2                    99 94 100  FiO2                    0.40 0.40 0.5  , Measurements from flowsheet : Height and Weight   19 05:00 CDT CLINICALWEIGHT 88.4 kg    Weight Method Measured    Weight Scale Bed         Neuro: Sedated, fentanyl 125  Resp:Trach in place,  CV: RRR  GI: Soft, NT, ND, G-tube in place      Intake and Output   I&O 24 hr   I & O between:  2019 11:55 TO 2019 11:55  Med Dosing Weight:  86.5  kg   2019  24 Hour Intake:   3128.75  ( 36.17 mL/kg )  24 Hour Output:   1650.00           24 Hour Urine/Stool Output:   0.0  24 Hour Balance:   1478.75           24 Hour Urine Output:   1450.00  ( 0.70 mL/kg/hr )      Review / Management   Laboratory results:     Labs between:  2019 11:55 to 2019 11:55  CBC:                 WBC  HgB  Hct  Plt  MCV  RDW   2019 (H) 21.0  (L) 7.4  (L) 25.3  388  (H) 100.4  (H) 24.3  DIFF:                 Seg  Neutroph//ABS  Lymph//ABS  Mono//ABS  EOS/ABS  2019 92  // (H) 19.3  // (L) 0.2  // 0.6 // (H) 0.8   BMP:                 Na  Cl  BUN  Glu   2019 142  107  (H) 31  (H) 114                              K  CO2  Cr  Ca                              3.6  29  0.99  (L) 8.1   Other Chem:             Mg  Phos  Triglycerides  GGTP  DirectBili                           (H) 2.5  3.2         COAG:                 INR  PT  PTT   Ddimer  Fibrinogen    24-JUL-2019 1.0  10.8  31                      .    Assessment & Plan   Patient is a 72 yo M with multiple co morbidities who presents as a trauma consultation after he had a fall yesterday. CT head with findings of subdural hematoma which is likely traumatic.  Dx:  1. Subdural hematoma w/ shift  2. Subarachnoid hematoma  Consults:  1. Neurosurgery  2. Neurology  3. Hospitalist  4. Cardiology  Procedures:  R Craniotomy and SDH evacuation - 07/14  Plan  Neuro:  Oral pain meds and seroquel started  Keppra  Q1h neuri checks  EEG  Neurology on consult  NSx on consult  Monitor drain output  Resp:  Trach in place  CV:  SBP< 130 mmHg  Cardiology on consult for extensive cardiac history  Amiodarone, mexlitene restarted   started  GI:  G tube in place  Trickle feeds today  :  monitor I/Os  Heme:  Hb stable  s/p platelets x 2  Met:  Replete lytes PRN  Hypernatremia- resolving from 149 to 145 to 142today- continue Free water flushes at 250ml/q6h  Repeat BMP at 1800h  PPx:  SCDs, Lovenox  Dispo:  SICU  Seen and discussed with Dr. Amos VILLANUEVA  General Surgery PGY-2  I personally saw and evaluated the patient and agree with the resident findings.  40 minutes of critical care time spent with this patient   pt received to 6, aox3.  pt c/o SOB and weakness x a few days.  pt has known history of a-fib.  denies chest pain.  pt on tele monitor, v/s as noted.  SL placed, labs sent, MD at bedside.  report given to primary RN

## 2020-09-20 ENCOUNTER — EMERGENCY (EMERGENCY)
Facility: HOSPITAL | Age: 60
LOS: 1 days | Discharge: HOME | End: 2020-09-20
Attending: EMERGENCY MEDICINE | Admitting: EMERGENCY MEDICINE
Payer: COMMERCIAL

## 2020-09-20 VITALS
OXYGEN SATURATION: 96 % | TEMPERATURE: 97 F | HEART RATE: 95 BPM | DIASTOLIC BLOOD PRESSURE: 74 MMHG | RESPIRATION RATE: 17 BRPM | SYSTOLIC BLOOD PRESSURE: 139 MMHG

## 2020-09-20 VITALS
SYSTOLIC BLOOD PRESSURE: 123 MMHG | HEART RATE: 93 BPM | DIASTOLIC BLOOD PRESSURE: 70 MMHG | TEMPERATURE: 98 F | HEIGHT: 76 IN | OXYGEN SATURATION: 97 % | RESPIRATION RATE: 18 BRPM

## 2020-09-20 DIAGNOSIS — Z98.890 OTHER SPECIFIED POSTPROCEDURAL STATES: Chronic | ICD-10-CM

## 2020-09-20 LAB
ALBUMIN SERPL ELPH-MCNC: 4.6 G/DL — SIGNIFICANT CHANGE UP (ref 3.3–5)
ALP SERPL-CCNC: 108 U/L — SIGNIFICANT CHANGE UP (ref 40–120)
ALT FLD-CCNC: 14 U/L — SIGNIFICANT CHANGE UP (ref 4–41)
ANION GAP SERPL CALC-SCNC: 12 MMO/L — SIGNIFICANT CHANGE UP (ref 7–14)
APTT BLD: 32.6 SEC — SIGNIFICANT CHANGE UP (ref 27–36.3)
AST SERPL-CCNC: 8 U/L — SIGNIFICANT CHANGE UP (ref 4–40)
BASE EXCESS BLDV CALC-SCNC: 5 MMOL/L — SIGNIFICANT CHANGE UP
BASOPHILS # BLD AUTO: 0.04 K/UL — SIGNIFICANT CHANGE UP (ref 0–0.2)
BASOPHILS NFR BLD AUTO: 0.7 % — SIGNIFICANT CHANGE UP (ref 0–2)
BILIRUB SERPL-MCNC: 0.5 MG/DL — SIGNIFICANT CHANGE UP (ref 0.2–1.2)
BLOOD GAS VENOUS - CREATININE: 1.08 MG/DL — SIGNIFICANT CHANGE UP (ref 0.5–1.3)
BLOOD GAS VENOUS - FIO2: 21 — SIGNIFICANT CHANGE UP
BUN SERPL-MCNC: 17 MG/DL — SIGNIFICANT CHANGE UP (ref 7–23)
CALCIUM SERPL-MCNC: 9.7 MG/DL — SIGNIFICANT CHANGE UP (ref 8.4–10.5)
CHLORIDE BLDV-SCNC: 108 MMOL/L — SIGNIFICANT CHANGE UP (ref 96–108)
CHLORIDE SERPL-SCNC: 99 MMOL/L — SIGNIFICANT CHANGE UP (ref 98–107)
CO2 SERPL-SCNC: 26 MMOL/L — SIGNIFICANT CHANGE UP (ref 22–31)
CREAT SERPL-MCNC: 1.03 MG/DL — SIGNIFICANT CHANGE UP (ref 0.5–1.3)
EOSINOPHIL # BLD AUTO: 0.75 K/UL — HIGH (ref 0–0.5)
EOSINOPHIL NFR BLD AUTO: 14 % — HIGH (ref 0–6)
GAS PNL BLDV: 132 MMOL/L — LOW (ref 136–146)
GLUCOSE BLDV-MCNC: 102 MG/DL — HIGH (ref 70–99)
GLUCOSE SERPL-MCNC: 104 MG/DL — HIGH (ref 70–99)
HCO3 BLDV-SCNC: 26 MMOL/L — SIGNIFICANT CHANGE UP (ref 20–27)
HCT VFR BLD CALC: 51.8 % — HIGH (ref 39–50)
HCT VFR BLDV CALC: 53.9 % — HIGH (ref 39–51)
HGB BLD-MCNC: 16.4 G/DL — SIGNIFICANT CHANGE UP (ref 13–17)
HGB BLDV-MCNC: 17.6 G/DL — HIGH (ref 13–17)
IMM GRANULOCYTES NFR BLD AUTO: 0.2 % — SIGNIFICANT CHANGE UP (ref 0–1.5)
INR BLD: 1.12 — SIGNIFICANT CHANGE UP (ref 0.88–1.16)
LACTATE BLDV-MCNC: 1.4 MMOL/L — SIGNIFICANT CHANGE UP (ref 0.5–2)
LYMPHOCYTES # BLD AUTO: 1.7 K/UL — SIGNIFICANT CHANGE UP (ref 1–3.3)
LYMPHOCYTES # BLD AUTO: 31.8 % — SIGNIFICANT CHANGE UP (ref 13–44)
MCHC RBC-ENTMCNC: 28.7 PG — SIGNIFICANT CHANGE UP (ref 27–34)
MCHC RBC-ENTMCNC: 31.7 % — LOW (ref 32–36)
MCV RBC AUTO: 90.7 FL — SIGNIFICANT CHANGE UP (ref 80–100)
MONOCYTES # BLD AUTO: 0.52 K/UL — SIGNIFICANT CHANGE UP (ref 0–0.9)
MONOCYTES NFR BLD AUTO: 9.7 % — SIGNIFICANT CHANGE UP (ref 2–14)
NEUTROPHILS # BLD AUTO: 2.32 K/UL — SIGNIFICANT CHANGE UP (ref 1.8–7.4)
NEUTROPHILS NFR BLD AUTO: 43.6 % — SIGNIFICANT CHANGE UP (ref 43–77)
NRBC # FLD: 0 K/UL — SIGNIFICANT CHANGE UP (ref 0–0)
NT-PROBNP SERPL-SCNC: < 5 PG/ML — SIGNIFICANT CHANGE UP
PCO2 BLDV: 55 MMHG — HIGH (ref 41–51)
PH BLDV: 7.36 PH — SIGNIFICANT CHANGE UP (ref 7.32–7.43)
PLATELET # BLD AUTO: 296 K/UL — SIGNIFICANT CHANGE UP (ref 150–400)
PMV BLD: 10.2 FL — SIGNIFICANT CHANGE UP (ref 7–13)
PO2 BLDV: 27 MMHG — LOW (ref 35–40)
POTASSIUM BLDV-SCNC: 3.8 MMOL/L — SIGNIFICANT CHANGE UP (ref 3.4–4.5)
POTASSIUM SERPL-MCNC: 4.1 MMOL/L — SIGNIFICANT CHANGE UP (ref 3.5–5.3)
POTASSIUM SERPL-SCNC: 4.1 MMOL/L — SIGNIFICANT CHANGE UP (ref 3.5–5.3)
PROT SERPL-MCNC: 7.6 G/DL — SIGNIFICANT CHANGE UP (ref 6–8.3)
PROTHROM AB SERPL-ACNC: 12.7 SEC — SIGNIFICANT CHANGE UP (ref 10.6–13.6)
RBC # BLD: 5.71 M/UL — SIGNIFICANT CHANGE UP (ref 4.2–5.8)
RBC # FLD: 13.6 % — SIGNIFICANT CHANGE UP (ref 10.3–14.5)
SAO2 % BLDV: 43.4 % — LOW (ref 60–85)
SARS-COV-2 RNA SPEC QL NAA+PROBE: SIGNIFICANT CHANGE UP
SODIUM SERPL-SCNC: 137 MMOL/L — SIGNIFICANT CHANGE UP (ref 135–145)
TROPONIN T, HIGH SENSITIVITY: 12 NG/L — SIGNIFICANT CHANGE UP (ref ?–14)
TROPONIN T, HIGH SENSITIVITY: 13 NG/L — SIGNIFICANT CHANGE UP (ref ?–14)
WBC # BLD: 5.34 K/UL — SIGNIFICANT CHANGE UP (ref 3.8–10.5)
WBC # FLD AUTO: 5.34 K/UL — SIGNIFICANT CHANGE UP (ref 3.8–10.5)

## 2020-09-20 PROCEDURE — 93010 ELECTROCARDIOGRAM REPORT: CPT | Mod: NC

## 2020-09-20 PROCEDURE — 99243 OFF/OP CNSLTJ NEW/EST LOW 30: CPT | Mod: GC

## 2020-09-20 PROCEDURE — 99285 EMERGENCY DEPT VISIT HI MDM: CPT | Mod: 25

## 2020-09-20 PROCEDURE — 71045 X-RAY EXAM CHEST 1 VIEW: CPT | Mod: 26

## 2020-09-20 RX ORDER — MAGNESIUM SULFATE 500 MG/ML
2 VIAL (ML) INJECTION ONCE
Refills: 0 | Status: COMPLETED | OUTPATIENT
Start: 2020-09-20 | End: 2020-09-20

## 2020-09-20 RX ORDER — IPRATROPIUM/ALBUTEROL SULFATE 18-103MCG
3 AEROSOL WITH ADAPTER (GRAM) INHALATION ONCE
Refills: 0 | Status: COMPLETED | OUTPATIENT
Start: 2020-09-20 | End: 2020-09-20

## 2020-09-20 RX ORDER — BUDESONIDE AND FORMOTEROL FUMARATE DIHYDRATE 160; 4.5 UG/1; UG/1
2 AEROSOL RESPIRATORY (INHALATION)
Qty: 1 | Refills: 0
Start: 2020-09-20 | End: 2020-10-19

## 2020-09-20 RX ORDER — IPRATROPIUM/ALBUTEROL SULFATE 18-103MCG
3 AEROSOL WITH ADAPTER (GRAM) INHALATION
Refills: 0 | Status: COMPLETED | OUTPATIENT
Start: 2020-09-20 | End: 2020-09-20

## 2020-09-20 RX ADMIN — Medication 3 MILLILITER(S): at 11:45

## 2020-09-20 RX ADMIN — Medication 2 GRAM(S): at 12:45

## 2020-09-20 RX ADMIN — Medication 3 MILLILITER(S): at 13:03

## 2020-09-20 RX ADMIN — Medication 3 MILLILITER(S): at 12:15

## 2020-09-20 RX ADMIN — Medication 50 GRAM(S): at 11:45

## 2020-09-20 RX ADMIN — Medication 40 MILLIGRAM(S): at 11:45

## 2020-09-20 NOTE — ED PROVIDER NOTE - PROGRESS NOTE DETAILS
pt feeling better, would like to go home. Evaluated by [facility], agree and recommend admission. PT AO3 prefers to go home, return if don't feel better. will leave against medical advice. Pulm recommend steroids and Symbicort. Thom PGY-3:  Pt continues to feel ok, states no SOB, walking around in room without issue. Pulm fellow recommended pt to stay in [facility] for further Tx, pt states is feeling good and would rather go home. D/W pt at length staying in hospital and benefit/risk including worsening exacerbation after medications wear off and death, states wants to go home. Will AMA

## 2020-09-20 NOTE — ED PROVIDER NOTE - ATTENDING CONTRIBUTION TO CARE
Attending Statement: I have personally seen and examined this patient. I have fully participated in the care of this patient. I have reviewed all pertinent clinical information, including history physical exam, plan and the Resident's note and agree except as noted  59yo M pmhx asthma (past hospitalizations no intubations), afib s/p ablation, hypothyroidism here w/ cc of SOB x one month. Feels similar to prior asthma exacerbation. States "hoping it would get better" Seen at  given prednisone x 4 days and albuterol neb. Finished pred two days ago, symptoms persisted. Endorse BRIDGES, wheezing, "some" cough "sometimes" productive no fever/chills no chest pain. no hx of intubation. Hasn't seen pulmonologist in a year. no n/v/d non smoker.   Vital signs noted. mmm. no acute resp distress. Able to talk in full sentences. no retractions. bl wheezing. pulse ox 95-97 RA obese male nt abdomen. no pedal edema. no calf tenderness. normal pulses bilateral feet.  plan labs, ekg, cxr, covid, peak flow, steroid/neb/magnesium, re assess likely admit

## 2020-09-20 NOTE — CONSULT NOTE ADULT - ATTENDING COMMENTS
Agree with above.  Patient seen and examined. Chart reviewed.    60 year old man with asthma, a-fib (s/p cardioversion and ablation), OHS (home O2), hypothyroidism presenting with Asthma exacerbation recently completed a course of steroids on my evaluation patiens was symptomatically much improved no longer short of breath or wheezing after treatment in the ED. 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

## 2020-09-20 NOTE — ED PROVIDER NOTE - OBJECTIVE STATEMENT
61yo M pmhx asthma (past hospitalizations no intubations), afib s/p ablation, hypothyroidism here w/ cc of SOB    States SOB for 1 month, coming in today because "it wont get better" however not feeling any worse. No F/C, no change in cough. Went to  6 days ago and got 4 day course of prednisone which helped. Also taking albuterol q2hrs which has been helping however it makes his heart race so does not like it. No hx of smoking. No CP, no pain with breathing in, no SOB with exeriton. No cardiac hx. No hx DM.

## 2020-09-20 NOTE — ED PROVIDER NOTE - NS ED ROS FT
CONSTITUTIONAL: No fevers, no chills  Cardiovascular: No Chest pain  Respiratory: +SOB  Gastrointestinal: No n/v/d, no abd pain  Genitourinary: no dysuria, no hematuria  SKIN: no rashes.  NEURO: no headache, no weakness or numbness  PSYCHIATRIC: no known mental health issues.  Endocrine: No unexplained weight gain

## 2020-09-20 NOTE — ED PROVIDER NOTE - CLINICAL SUMMARY MEDICAL DECISION MAKING FREE TEXT BOX
Thom PGY-3:  61yo M w/ pmhx as described in HPI here w/ cc of SOB, wheezing ins/exp throughout all lung fields and improvement with albuterol, hx of allergies and asthma  - likely asthma exasperation, will give Tx for asthma, bloodwork and reassess   No CP no pleuritic pain no PE RF do not think this is PE, no F/C or new cough do not think this is infectious in etiology

## 2020-09-20 NOTE — ED PROVIDER NOTE - NSFOLLOWUPINSTRUCTIONS_ED_ALL_ED_FT
(1) Follow up with your primary care physician and Pulmonologist as discussed  (2) Immediately seek care at your nearest emergency room if your worsen, persist, or do not resolve   (3) Take Tylenol (up to 1000mg or 1 g)  and/or Motrin (up to 600mg) up to every 6 hours as needed for pain.   (4) Take the prescribed medication as instructed:  -Symbicort 2 puffs twice a day  -Prednisone 50mg once a day for 5 days

## 2020-09-20 NOTE — ED ADULT TRIAGE NOTE - CHIEF COMPLAINT QUOTE
pt c/o diff breathing with wheezing for 1 month, positive cough with green sputum,  pt has loop recorder hx of  ablation for Afib  states last time this happen it was his heart  deneis any cp

## 2020-09-20 NOTE — ED ADULT NURSE NOTE - OBJECTIVE STATEMENT
61 yo male, hx of asthma, afib (pt had ablation performed, no longer requires anticoagulation), c/o SOB when lying down , intermittent cough w green production for over 1 month. pt breathing, even/non labored, abdomen round, soft, non tender, no LE edema present. pt denies SOB with exertion, chest pain, fever/chills, sick contacts, n/v/d, dysuria. 20g iv insert to R ac, labs sent as ordered. EKG performed. CCM in place.

## 2020-09-20 NOTE — ED PROVIDER NOTE - PATIENT PORTAL LINK FT
You can access the FollowMyHealth Patient Portal offered by [facility] by registering at the following website: [email or web address]. By joining [employer]’s FollowMyHealth portal, you will also be able to view your health information using other applications (apps) compatible with our system.

## 2020-09-20 NOTE — CONSULT NOTE ADULT - ASSESSMENT
60M PMH asthma (past hospitalizations no intubations), afib (s/p cardioversion, s/p ablation), OHS (home O2), hypothyroidism presents to the ED with chief complaint of wheezing and coughing consistent with asthma exacerbation.    Of note, on chart review, patient noted to have IgE count of ~900. currently patient has abs eosinophil count of 0.57 after steroid taper. 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     # Asthma exacerbation  - Etiology likely in the context of lack of eosinophilic control and sub-optimal controller medication adherence  - Old PFT's demonstrate moderate obstructive physiology without bronchodilator response and extrathoracic restrictive defect 2/2 obesity  - Patient currently does not wish to stay in the hospital as he is feeling better. Risk v benefits discussed and patient and is able to demonstrate understanding with readback  - Will discharge patient with Symbicort 160 2 puffs BID and Prednisone 50mg and plan for slow taper with his Pulmonologist, Dr. Lydia Marquez.  - Monoclonal antibody therapy can be discussed as an outpatient        Hector Douglas MD  PGY-6  Pulmonary and Critical Care Fellow  Pager: [phone]

## 2020-09-20 NOTE — ED PROVIDER NOTE - PHYSICAL EXAMINATION
General: well appearing, interactive, well nourished, NAD  HEENT: pupils equal and reactive, normal external ears bilaterally   Cardiac: RRR, no MRG appreciated  Resp: inspiratory and expiratory wheezing throughout all lung fields   Abd: soft, nontender, nondistended,   : no CVA tenderness  Neuro: Moving all extremities  Skin:  normal color for race

## 2020-09-20 NOTE — CONSULT NOTE ADULT - SUBJECTIVE AND OBJECTIVE BOX
HPI:  60M PMH asthma (past hospitalizations no intubations), afib (s/p cardioversion, s/p ablation), OHS (home O2), hypothyroidism presents to the ED with chief complaint of wheezing and coughing consistent with past asthma exacerbations. Patient states that over the last couple of days he has been experiencing worsening wheezing and coughing, particularly with lying down in bed, which is typical for his asthma flares. He denies any recent flu-like symptoms or recent illnesses. He had to urgent care and was provided with a prednisone taper (unclear dose) with limited symptom control. Furthermore, patient has been using rescue albuterol treatment ~4x/day and albuterol via nebulizer w/o relief. For these reasons patient came to the ED for further medical attention. While in the ED, patient received multiple rounds of duonebs, 2g of magnesium and 60mg solumedrol. Pulmonary was consulted for possible admission to respiratory care unit for further monitoring and treatment of his asthma symptoms.     PAST MEDICAL & SURGICAL HISTORY:  AF (atrial fibrillation)    Hypothyroidism    Asthma    S/P arthroscopic surgery of right knee  Meniscus repair        FAMILY HISTORY:  Family history of coronary artery disease (Mother)    Family history of diabetes mellitus (Mother)    Family history of hypertension (Mother)        SOCIAL HISTORY:  Smoking: Former smokers  EtOH Use: denies  Marital Status:   Exposures: none  Recent Travel: none    Allergies    No Known Drug Allergies  shellfish (Angioedema)    Intolerances        HOME MEDICATIONS:    REVIEW OF SYSTEMS:  CONSTITUTIONAL: No weakness, fevers or chills  EYES/ENT: No visual changes;  No vertigo or throat pain   NECK: No pain or stiffness  RESPIRATORY: No cough, wheezing, hemoptysis; No shortness of breath  CARDIOVASCULAR: No chest pain or palpitations  GASTROINTESTINAL: No abdominal or epigastric pain. No nausea, vomiting, or hematemesis; No diarrhea or constipation. No melena or hematochezia.  GENITOURINARY: No dysuria, frequency or hematuria  NEUROLOGICAL: No numbness or weakness  SKIN: No itching, burning, rashes, or lesions   All other review of systems is negative unless indicated above.    OBJECTIVE:  ICU Vital Signs Last 24 Hrs  T(C): 36.1 (20 Sep 2020 13:40), Max: 36.7 (20 Sep 2020 10:56)  T(F): 97 (20 Sep 2020 13:40), Max: 98 (20 Sep 2020 10:56)  HR: 95 (20 Sep 2020 13:40) (91 - 95)  BP: 139/74 (20 Sep 2020 13:40) (122/75 - 139/74)  BP(mean): --  ABP: --  ABP(mean): --  RR: 17 (20 Sep 2020 13:40) (17 - 18)  SpO2: 96% (20 Sep 2020 13:40) (95% - 97%)        CAPILLARY BLOOD GLUCOSE          PHYSICAL EXAM:  General: WN/WD NAD  Neurology: A&Ox3, nonfocal, ALAN x 4  Eyes: PERRLA/ EOMI, Gross vision intact  ENT/Neck: Neck supple, trachea midline, No JVD, Gross hearing intact  Respiratory: CTA B/L, No wheezing, rales, rhonchi  CV: RRR, +S1/S2, -S3/S4, no murmurs, rubs or gallops  Abdominal: Soft, NT, ND +BS, No HSM  MSK: 5/5 strength UE/LE bilaterally  Extremities: No edema, 2+ peripheral pulses  Skin: No Rashes, Hematoma, Ecchymosis      HOSPITAL MEDICATIONS:  MEDICATIONS  (STANDING):    MEDICATIONS  (PRN):      LABS:                        16.4   5.34  )-----------( 296      ( 20 Sep 2020 11:20 )             51.8     09-20    137  |  99  |  17  ----------------------------<  104<H>  4.1   |  26  |  1.03    Ca    9.7      20 Sep 2020 11:20    TPro  7.6  /  Alb  4.6  /  TBili  0.5  /  DBili  x   /  AST  8   /  ALT  14  /  AlkPhos  108  09-20    PT/INR - ( 20 Sep 2020 11:20 )   PT: 12.7 SEC;   INR: 1.12          PTT - ( 20 Sep 2020 11:20 )  PTT:32.6 SEC      Venous Blood Gas:  09-20 @ 11:20  7.36/55/27/26/43.4  VBG Lactate: 1.4      MICROBIOLOGY:     RADIOLOGY:  [x] Reviewed by me    CXR:   IMPRESSION:  Clear lungs..    EKG: Sinus tachycardia

## 2021-01-31 ENCOUNTER — EMERGENCY (EMERGENCY)
Facility: HOSPITAL | Age: 61
LOS: 1 days | Discharge: HOME | End: 2021-01-31
Attending: EMERGENCY MEDICINE | Admitting: EMERGENCY MEDICINE
Payer: COMMERCIAL

## 2021-01-31 VITALS
DIASTOLIC BLOOD PRESSURE: 79 MMHG | SYSTOLIC BLOOD PRESSURE: 129 MMHG | HEART RATE: 86 BPM | OXYGEN SATURATION: 100 % | TEMPERATURE: 98 F | RESPIRATION RATE: 16 BRPM

## 2021-01-31 VITALS
HEART RATE: 83 BPM | HEIGHT: 76 IN | DIASTOLIC BLOOD PRESSURE: 82 MMHG | TEMPERATURE: 98 F | RESPIRATION RATE: 18 BRPM | SYSTOLIC BLOOD PRESSURE: 124 MMHG | OXYGEN SATURATION: 97 %

## 2021-01-31 DIAGNOSIS — Z98.890 OTHER SPECIFIED POSTPROCEDURAL STATES: Chronic | ICD-10-CM

## 2021-01-31 LAB
ALBUMIN SERPL ELPH-MCNC: 4.1 G/DL — SIGNIFICANT CHANGE UP (ref 3.3–5)
ALP SERPL-CCNC: 114 U/L — SIGNIFICANT CHANGE UP (ref 40–120)
ALT FLD-CCNC: 18 U/L — SIGNIFICANT CHANGE UP (ref 4–41)
ANION GAP SERPL CALC-SCNC: 8 MMOL/L — SIGNIFICANT CHANGE UP (ref 7–14)
APTT BLD: 32.6 SEC — SIGNIFICANT CHANGE UP (ref 27–36.3)
AST SERPL-CCNC: 22 U/L — SIGNIFICANT CHANGE UP (ref 4–40)
B PERT DNA SPEC QL NAA+PROBE: SIGNIFICANT CHANGE UP
BASOPHILS # BLD AUTO: 0.07 K/UL — SIGNIFICANT CHANGE UP (ref 0–0.2)
BASOPHILS NFR BLD AUTO: 1.4 % — SIGNIFICANT CHANGE UP (ref 0–2)
BILIRUB SERPL-MCNC: 0.4 MG/DL — SIGNIFICANT CHANGE UP (ref 0.2–1.2)
BLOOD GAS VENOUS COMPREHENSIVE RESULT: SIGNIFICANT CHANGE UP
BUN SERPL-MCNC: 18 MG/DL — SIGNIFICANT CHANGE UP (ref 7–23)
C PNEUM DNA SPEC QL NAA+PROBE: SIGNIFICANT CHANGE UP
CALCIUM SERPL-MCNC: 9.3 MG/DL — SIGNIFICANT CHANGE UP (ref 8.4–10.5)
CHLORIDE SERPL-SCNC: 104 MMOL/L — SIGNIFICANT CHANGE UP (ref 98–107)
CO2 SERPL-SCNC: 24 MMOL/L — SIGNIFICANT CHANGE UP (ref 22–31)
CREAT SERPL-MCNC: 0.93 MG/DL — SIGNIFICANT CHANGE UP (ref 0.5–1.3)
EOSINOPHIL # BLD AUTO: 0.75 K/UL — HIGH (ref 0–0.5)
EOSINOPHIL NFR BLD AUTO: 14.6 % — HIGH (ref 0–6)
FLUAV H1 2009 PAND RNA SPEC QL NAA+PROBE: SIGNIFICANT CHANGE UP
FLUAV H1 RNA SPEC QL NAA+PROBE: SIGNIFICANT CHANGE UP
FLUAV H3 RNA SPEC QL NAA+PROBE: SIGNIFICANT CHANGE UP
FLUAV SUBTYP SPEC NAA+PROBE: SIGNIFICANT CHANGE UP
FLUBV RNA SPEC QL NAA+PROBE: SIGNIFICANT CHANGE UP
GLUCOSE SERPL-MCNC: 94 MG/DL — SIGNIFICANT CHANGE UP (ref 70–99)
HADV DNA SPEC QL NAA+PROBE: SIGNIFICANT CHANGE UP
HCOV PNL SPEC NAA+PROBE: SIGNIFICANT CHANGE UP
HCT VFR BLD CALC: 46.4 % — SIGNIFICANT CHANGE UP (ref 39–50)
HGB BLD-MCNC: 14.9 G/DL — SIGNIFICANT CHANGE UP (ref 13–17)
HMPV RNA SPEC QL NAA+PROBE: SIGNIFICANT CHANGE UP
HPIV1 RNA SPEC QL NAA+PROBE: SIGNIFICANT CHANGE UP
HPIV2 RNA SPEC QL NAA+PROBE: SIGNIFICANT CHANGE UP
HPIV3 RNA SPEC QL NAA+PROBE: SIGNIFICANT CHANGE UP
HPIV4 RNA SPEC QL NAA+PROBE: SIGNIFICANT CHANGE UP
IANC: 2.15 K/UL — SIGNIFICANT CHANGE UP (ref 1.5–8.5)
IMM GRANULOCYTES NFR BLD AUTO: 0.4 % — SIGNIFICANT CHANGE UP (ref 0–1.5)
INR BLD: 1.1 RATIO — SIGNIFICANT CHANGE UP (ref 0.88–1.16)
LYMPHOCYTES # BLD AUTO: 1.71 K/UL — SIGNIFICANT CHANGE UP (ref 1–3.3)
LYMPHOCYTES # BLD AUTO: 33.3 % — SIGNIFICANT CHANGE UP (ref 13–44)
MCHC RBC-ENTMCNC: 28.5 PG — SIGNIFICANT CHANGE UP (ref 27–34)
MCHC RBC-ENTMCNC: 32.1 GM/DL — SIGNIFICANT CHANGE UP (ref 32–36)
MCV RBC AUTO: 88.9 FL — SIGNIFICANT CHANGE UP (ref 80–100)
MONOCYTES # BLD AUTO: 0.43 K/UL — SIGNIFICANT CHANGE UP (ref 0–0.9)
MONOCYTES NFR BLD AUTO: 8.4 % — SIGNIFICANT CHANGE UP (ref 2–14)
NEUTROPHILS # BLD AUTO: 2.15 K/UL — SIGNIFICANT CHANGE UP (ref 1.8–7.4)
NEUTROPHILS NFR BLD AUTO: 41.9 % — LOW (ref 43–77)
NRBC # BLD: 0 /100 WBCS — SIGNIFICANT CHANGE UP
NRBC # FLD: 0 K/UL — SIGNIFICANT CHANGE UP
PLATELET # BLD AUTO: 258 K/UL — SIGNIFICANT CHANGE UP (ref 150–400)
POTASSIUM SERPL-MCNC: 4.9 MMOL/L — SIGNIFICANT CHANGE UP (ref 3.5–5.3)
POTASSIUM SERPL-SCNC: 4.9 MMOL/L — SIGNIFICANT CHANGE UP (ref 3.5–5.3)
PROT SERPL-MCNC: 7.6 G/DL — SIGNIFICANT CHANGE UP (ref 6–8.3)
PROTHROM AB SERPL-ACNC: 12.6 SEC — SIGNIFICANT CHANGE UP (ref 10.6–13.6)
RAPID RVP RESULT: SIGNIFICANT CHANGE UP
RBC # BLD: 5.22 M/UL — SIGNIFICANT CHANGE UP (ref 4.2–5.8)
RBC # FLD: 14 % — SIGNIFICANT CHANGE UP (ref 10.3–14.5)
RV+EV RNA SPEC QL NAA+PROBE: SIGNIFICANT CHANGE UP
SARS-COV-2 RNA SPEC QL NAA+PROBE: SIGNIFICANT CHANGE UP
SODIUM SERPL-SCNC: 136 MMOL/L — SIGNIFICANT CHANGE UP (ref 135–145)
TROPONIN T, HIGH SENSITIVITY RESULT: 10 NG/L — SIGNIFICANT CHANGE UP
TROPONIN T, HIGH SENSITIVITY RESULT: 10 NG/L — SIGNIFICANT CHANGE UP
WBC # BLD: 5.13 K/UL — SIGNIFICANT CHANGE UP (ref 3.8–10.5)
WBC # FLD AUTO: 5.13 K/UL — SIGNIFICANT CHANGE UP (ref 3.8–10.5)

## 2021-01-31 PROCEDURE — 99284 EMERGENCY DEPT VISIT MOD MDM: CPT

## 2021-01-31 PROCEDURE — 71045 X-RAY EXAM CHEST 1 VIEW: CPT | Mod: 26

## 2021-01-31 RX ORDER — TIOTROPIUM BROMIDE 18 UG/1
1 CAPSULE ORAL; RESPIRATORY (INHALATION) DAILY
Refills: 0 | Status: DISCONTINUED | OUTPATIENT
Start: 2021-01-31 | End: 2021-02-04

## 2021-01-31 RX ORDER — ALBUTEROL 90 UG/1
4 AEROSOL, METERED ORAL ONCE
Refills: 0 | Status: COMPLETED | OUTPATIENT
Start: 2021-01-31 | End: 2021-01-31

## 2021-01-31 RX ADMIN — Medication 125 MILLIGRAM(S): at 13:56

## 2021-01-31 RX ADMIN — ALBUTEROL 4 PUFF(S): 90 AEROSOL, METERED ORAL at 13:56

## 2021-01-31 RX ADMIN — TIOTROPIUM BROMIDE 1 CAPSULE(S): 18 CAPSULE ORAL; RESPIRATORY (INHALATION) at 13:56

## 2021-01-31 NOTE — ED PROVIDER NOTE - NSFOLLOWUPINSTRUCTIONS_ED_ALL_ED_FT
You are having an asthma exacerbation.    It is likely from a virus, which could be Covid-19, your test is pending.    Use the albuterol for symptoms. 4 puffs every 4 hours as needed.    Take the steroids for 4 more days.    Follow up with your doctor.    Please return to ER for new or concerning symptoms.

## 2021-01-31 NOTE — ED PROVIDER NOTE - PHYSICAL EXAMINATION
Gen: obese man, well-appearing, no acute distress  CV: normal S1, S2; no m, r, g appreciated  Pulm: +expiratory wheezes in bilateral lung fields  Abd: soft, ntnd  Ext: no LE edema  Neuro: grossly intact Gen: obese man, well-appearing, no acute distress  CV: normal S1, S2; no m, r, g appreciated  Pulm: +expiratory wheezes in bilateral lung fields  Abd: soft, ntnd  Ext: no LE edema  Neuro: grossly intact  Skin: no rashes appreciated

## 2021-01-31 NOTE — ED PROVIDER NOTE - CLINICAL SUMMARY MEDICAL DECISION MAKING FREE TEXT BOX
Brianna, PGY3- seen with MS4. Presentation consistent with asthma. Will treat for that and possible viral syndrome.

## 2021-01-31 NOTE — ED ADULT NURSE REASSESSMENT NOTE - NS ED NURSE REASSESS COMMENT FT1
Report received from previous shift RN, pt is AOX4, ambulatory, NSR on cardiac monitor. Pt offers no complaints at this time, denies any SOB, breathing even and non-labored, speaking full and complete sentences. Awaiting further plan of care

## 2021-01-31 NOTE — ED PROVIDER NOTE - OBJECTIVE STATEMENT
Pt is a 60M w/ a pmh of asthma, afib (s/p cardioversion and ablation) presenting with wheezing. Pt says his wheezing is a chronic problem for him, is triggered by the cold, and is worsened by lying down. Associated symptoms included occasional productive nonbloody cough. He has been using his rescue inhaler 4-6x per day recently and uses a nebulizer at night. His symptoms have been stable, but he came in today because he wanted to get better control of his symptoms because he has upcoming travel plans. Nonsmoker. Denies fever, recent infection (including negative rapid COVID yesterday), extremity swelling. Pt is a 60M w/ a pmh of asthma, afib (s/p cardioversion and ablation) presenting with wheezing. Pt says his wheezing is a chronic problem for him, is triggered by the cold, and is worsened by lying down. Associated symptoms included occasional productive nonbloody cough. He has been using his rescue inhaler 4-6x per day recently and uses a nebulizer at night. His symptoms have been stable, but he came in today because he wanted to get better control of his symptoms because he has upcoming travel plans. Nonsmoker. Denies fever, recent infection (including negative rapid COVID yesterday), extremity swelling.  He states that current symptoms feel like his asthma symptoms.

## 2021-01-31 NOTE — ED ADULT NURSE NOTE - OBJECTIVE STATEMENT
Pt a&ox3 c/o shortness of breath x2-3 weeks, h/o asthma, breathing even and unlabored at rest, denies chest pain, no headache/dizziness, no abd pain, no n/v/d, skin is cool dry and intact, ivl placed, labs sent, will continue to monitor.

## 2021-01-31 NOTE — ED PROVIDER NOTE - PROGRESS NOTE DETAILS
Brianna, PGY3- trop flat x2. Feeling better. Will treat for asthma exacerbation. Continue albuterol at home. Steroids for 5 days. Stable for d/c.

## 2021-01-31 NOTE — ED PROVIDER NOTE - ATTENDING CONTRIBUTION TO CARE
Dr. Subramanian: 61 yo male with asthma (on albuterol) and afib, in ED with a few days of wheezing above baseline.  At baseline pt with chronic wheezing, does not usually come to ED until wheezing is worse than today, but came because he has plans to travel soon and wants to make sure he does not have Covid.  He endorses nonproductive cough and increased use of his albuterol, but otherwise no new symptoms.  He states that he feels like his symptoms are consistent with prior asthma exacerbations.  On exam pt chronically-ill appearing but overall in NAD, heart RRR, lungs scattered wheezing throughout, abd NTND, extremities without swelling, strength 5/5 in all extremities and skin without rash.

## 2021-01-31 NOTE — ED PROVIDER NOTE - PATIENT PORTAL LINK FT
You can access the FollowMyHealth Patient Portal offered by [facility] by registering at the following website: [email or web address]. By joining [employer]’s FollowMyHealth portal, you will also be able to view your health information using other applications (apps) compatible with our system.

## 2021-03-11 PROBLEM — E66.2 OBESITY HYPOVENTILATION SYNDROME: Status: ACTIVE | Noted: 2018-10-04

## 2021-08-14 ENCOUNTER — INPATIENT (INPATIENT)
Facility: HOSPITAL | Age: 61
LOS: 1 days | Discharge: HOME | End: 2021-08-16
Attending: STUDENT IN AN ORGANIZED HEALTH CARE EDUCATION/TRAINING PROGRAM | Admitting: STUDENT IN AN ORGANIZED HEALTH CARE EDUCATION/TRAINING PROGRAM
Payer: SELF-PAY

## 2021-08-14 VITALS
HEART RATE: 104 BPM | TEMPERATURE: 98 F | DIASTOLIC BLOOD PRESSURE: 83 MMHG | HEIGHT: 76 IN | OXYGEN SATURATION: 100 % | RESPIRATION RATE: 28 BRPM | SYSTOLIC BLOOD PRESSURE: 148 MMHG

## 2021-08-14 DIAGNOSIS — Z98.890 OTHER SPECIFIED POSTPROCEDURAL STATES: Chronic | ICD-10-CM

## 2021-08-14 RX ORDER — MAGNESIUM SULFATE 500 MG/ML
2 VIAL (ML) INJECTION ONCE
Refills: 0 | Status: COMPLETED | OUTPATIENT
Start: 2021-08-14 | End: 2021-08-14

## 2021-08-14 RX ORDER — ALBUTEROL 90 UG/1
1 AEROSOL, METERED ORAL EVERY 4 HOURS
Refills: 0 | Status: DISCONTINUED | OUTPATIENT
Start: 2021-08-14 | End: 2021-08-14

## 2021-08-14 RX ORDER — IPRATROPIUM/ALBUTEROL SULFATE 18-103MCG
3 AEROSOL WITH ADAPTER (GRAM) INHALATION ONCE
Refills: 0 | Status: COMPLETED | OUTPATIENT
Start: 2021-08-14 | End: 2021-08-14

## 2021-08-14 RX ORDER — TIOTROPIUM BROMIDE 18 UG/1
1 CAPSULE ORAL; RESPIRATORY (INHALATION) DAILY
Refills: 0 | Status: DISCONTINUED | OUTPATIENT
Start: 2021-08-14 | End: 2021-08-14

## 2021-08-14 RX ORDER — IPRATROPIUM/ALBUTEROL SULFATE 18-103MCG
3 AEROSOL WITH ADAPTER (GRAM) INHALATION EVERY 6 HOURS
Refills: 0 | Status: DISCONTINUED | OUTPATIENT
Start: 2021-08-14 | End: 2021-08-15

## 2021-08-14 RX ADMIN — Medication 3 MILLILITER(S): at 23:56

## 2021-08-14 RX ADMIN — Medication 40 MILLIGRAM(S): at 23:56

## 2021-08-14 RX ADMIN — Medication 50 GRAM(S): at 23:56

## 2021-08-14 NOTE — ED PROVIDER NOTE - NSICDXFAMILYHX_GEN_ALL_CORE_FT
FAMILY HISTORY:  Mother  Still living? Unknown  Family history of coronary artery disease, Age at diagnosis: Age Unknown  Family history of diabetes mellitus, Age at diagnosis: Age Unknown  Family history of hypertension, Age at diagnosis: Age Unknown

## 2021-08-14 NOTE — ED PROVIDER NOTE - PHYSICAL EXAMINATION
CONSTITUTIONAL: Well appearing, awake, alert, oriented to person, place, time/situation and in no apparent distress.  HEAD: Atraumatic.  CARDIAC: Tachycardic. +S1/S2.  No murmurs, rubs or gallops.  RESPIRATORY: Diffuse wheezes B/L. Breathing labored, coughing intermittently.  ABDOMEN:  Soft, nontender, nondistended.  NEUROLOGICAL: Alert and oriented, no focal deficits.  SKIN: Skin warm and dry. No evidence of rashes or lesions. HEAD: Atraumatic.  CARDIAC: Tachycardic. +S1/S2.  No murmurs, rubs or gallops.  RESPIRATORY: Diffuse wheezes B/L. Breathing labored, coughing intermittently.  ABDOMEN:  Soft, nontender, nondistended.  NEUROLOGICAL: Alert and oriented, no focal deficits.  SKIN: Skin warm and dry. No evidence of rashes or lesions. HEAD: Atraumatic.  GENERAL: In distress, pausing for breaths while speaking. Alert and oriented.   CARDIAC: Tachycardic. +S1/S2.  No murmurs, rubs or gallops.  RESPIRATORY: Diffuse wheezes B/L. Breathing labored, coughing intermittently.  ABDOMEN:  Soft, nontender, nondistended.  NEUROLOGICAL: Alert and oriented, no focal deficits.  SKIN: Skin warm and dry. No evidence of rashes or lesions.

## 2021-08-14 NOTE — ED PROVIDER NOTE - CLINICAL SUMMARY MEDICAL DECISION MAKING FREE TEXT BOX
61 year old male with PMH asthma, a fib (s/p ablation), and hypothyroidism presents with shortness of breath. Evaluating for asthma exacerbation vs. infectious or cardiac etiology. Plan to treat with duonebs, supplemental oxygen, and corticosteroids, and do labs and CXR. 61 year old male with asthma, a fib (s/p ablation), and hypothyroidism presents with shortness of breath.  Ill appearing, mod-severe respiratory distress most likely d/t asthma exacerbation . Is wheezing audibly.  States he's been using his albuterol heavily, deneis any controller meds.  Plan to treat with duonebs, supplemental oxygen, and corticosteroids, and obtain labs and CXR.  Eval for infectious etiology, though pt reports sensitivity to seasonal changes.

## 2021-08-14 NOTE — ED PROVIDER NOTE - PROGRESS NOTE DETAILS
Gong: Patient seen and examined.  Endorsed to me from Dr. Umana.  Pt reports improved breathing, s/p 1 duoneb, and magnesium infusing.  Noted to still have bilateral wheezing.  Weaned from 10L NRB to 2L.  Will continue to monitor. Patient reports he is breathing much better, although still has wheezes. Patient reports he is breathing much better, although still has diffuse wheezes B/L.

## 2021-08-14 NOTE — ED PROVIDER NOTE - ATTENDING CONTRIBUTION TO CARE
Attending Attestation: Dr. Umana  I have personally performed a history and physical examination of the patient and discussed management with the resident as well as the patient.  I reviewed the resident's note and agree with the documented findings and plan of care.  I have authored and modified critical sections of the Provider Note, including but not limited to HPI, Physical Exam and MDM. 61 year old male with asthma, a fib (s/p ablation), and hypothyroidism presents with shortness of breath.  Ill appearing, mod-severe respiratory distress most likely d/t asthma exacerbation . Is wheezing audibly.  States he's been using his albuterol heavily, deneis any controller meds.  Plan to treat with duonebs, supplemental oxygen, and corticosteroids, and obtain labs and CXR.  Eval for infectious etiology, though pt reports sensitivity to seasonal changes.     Upon my evaluation, this patient had a high probability of imminent or life-threatening deterioration due to concern for respiratory failure which required my direct attention, intervention, and personal management.  The patient has a  medical condition that impairs one or more vital organ systems.  Frequent personal assessment and adjustment of medical interventions was performed.       I have personally provided 33 minutes of critical care time exclusive of time spent on separately billable procedures. Time includes review of laboratory data, radiology results, discussion with consultants, patient and family; monitoring for potential decompensation, as well as time spent retrieving data and reviewing the chart and documenting the visit. Interventions were performed as documented above.

## 2021-08-14 NOTE — ED PROVIDER NOTE - NS ED ROS FT
ROS:  -Constitutional: Denies fever  -Head: Denies headache  -Eyes: Denies blurry vision  -Cardiovascular: Denies chest pain  -Pulmonary: +Shortness of breath  -Gastrointestinal: Denies abdominal pain

## 2021-08-14 NOTE — ED ADULT TRIAGE NOTE - CHIEF COMPLAINT QUOTE
Pt arrives on NRB, as per EMS pt was unable to speak in full sentences on their arrival. C/o sob and palpitations x 2 hours. Pt given one albuterol tx with slight improvement PTA by EMS. Pt tachypneic in triage, brought directly to rm 9. Pt arrives on NRB, as per EMS pt was unable to speak in full sentences on their arrival. C/o sob and palpitations x 2 hours. Pt given one albuterol tx with slight improvement PTA by EMS. Pt tachypneic in triage, brought directly to rm 9. PMH - AFIB, asthma

## 2021-08-14 NOTE — ED ADULT NURSE NOTE - CHIEF COMPLAINT QUOTE
Pt arrives on NRB, as per EMS pt was unable to speak in full sentences on their arrival. C/o sob and palpitations x 2 hours. Pt given one albuterol tx with slight improvement PTA by EMS. Pt tachypneic in triage, brought directly to rm 9. PMH - AFIB, asthma

## 2021-08-14 NOTE — ED ADULT NURSE NOTE - OBJECTIVE STATEMENT
Pt A&Ox4, ambulatory at baseline. Respirations labored. Pt states he is asthmatic, arrives to the ED short of breath, diaphoretic and a cough. Pt on non rebreather at 98%. Pt placed on 4L NC, o2 sat 94% and patient request to be placed back on non rebreather. States symptoms have been worsening over the last two weeks. Pt denies; fever, chills, chest pain, n/v/d. 18G placed to R AC, labs drawn and sent as per MD order. PT medicated as per MD order. Currently using Duoneb inhaler.

## 2021-08-14 NOTE — ED PROVIDER NOTE - OBJECTIVE STATEMENT
61 year old male with PMH of asthma and atrial fibrillation (s/p cardiac ablation) presents with shortness of breath and cough. patient reports he has been feeling symptoms for the past 2 weeks and acutely worsened tonight. He has been using his albuterol inhaler for symptoms. Patient was brought in by EMS on NRB. Denies recent URI symptoms, fever, chills, chest pain, abdominal pain, myalgias. 61 year old male with PMHx of asthma and atrial fibrillation (s/p cardiac ablation) presents with shortness of breath and cough. Patient reports he has been feeling symptoms for the past 2 weeks and acutely worsened tonight. He has been using his albuterol inhaler for symptoms.  Patient was brought in by EMS on NRB - per EMS they gave albuterol and he started moving air better. Denies recent URI symptoms, fever, chills, chest pain, abdominal pain, myalgias.  Upon arrival is in moderate to severe resp distress.  O2 sat 90% ORA, to 94% on 4 LPM NC, and still feeling air hunger/requesting NRB. 61 year old male with PMHx of asthma and atrial fibrillation (s/p cardiac ablation) presents with shortness of breath and cough. Patient reports he has been feeling symptoms for the past 2 weeks and acutely worsened tonight. He has been using his albuterol inhaler for symptoms.  Patient was brought in by EMS on NRB - per EMS they gave albuterol and he started moving air better. Denies recent URI symptoms, fever, chills, chest pain, abdominal pain, myalgias.  Upon arrival is in moderate to severe resp distress.  O2 sat 90% ORA, to 94% on 4 LPM NC, and still feeling air hunger/requesting NRB.    PCP: Dr. Khushboo Augustin.

## 2021-08-15 DIAGNOSIS — J45.901 UNSPECIFIED ASTHMA WITH (ACUTE) EXACERBATION: ICD-10-CM

## 2021-08-15 DIAGNOSIS — E03.9 HYPOTHYROIDISM, UNSPECIFIED: ICD-10-CM

## 2021-08-15 DIAGNOSIS — Z29.9 ENCOUNTER FOR PROPHYLACTIC MEASURES, UNSPECIFIED: ICD-10-CM

## 2021-08-15 DIAGNOSIS — I48.91 UNSPECIFIED ATRIAL FIBRILLATION: ICD-10-CM

## 2021-08-15 DIAGNOSIS — D72.10 EOSINOPHILIA, UNSPECIFIED: ICD-10-CM

## 2021-08-15 LAB
ALBUMIN SERPL ELPH-MCNC: 4.4 G/DL — SIGNIFICANT CHANGE UP (ref 3.3–5)
ALP SERPL-CCNC: 152 U/L — HIGH (ref 40–120)
ALT FLD-CCNC: 17 U/L — SIGNIFICANT CHANGE UP (ref 4–41)
ANION GAP SERPL CALC-SCNC: 11 MMOL/L — SIGNIFICANT CHANGE UP (ref 7–14)
AST SERPL-CCNC: 13 U/L — SIGNIFICANT CHANGE UP (ref 4–40)
B PERT DNA SPEC QL NAA+PROBE: SIGNIFICANT CHANGE UP
B PERT+PARAPERT DNA PNL SPEC NAA+PROBE: SIGNIFICANT CHANGE UP
BASE EXCESS BLDV CALC-SCNC: -3.3 MMOL/L — LOW (ref -2–3)
BASE EXCESS BLDV CALC-SCNC: -3.6 MMOL/L — LOW (ref -2–3)
BASOPHILS # BLD AUTO: 0.09 K/UL — SIGNIFICANT CHANGE UP (ref 0–0.2)
BASOPHILS NFR BLD AUTO: 1 % — SIGNIFICANT CHANGE UP (ref 0–2)
BILIRUB SERPL-MCNC: <0.2 MG/DL — SIGNIFICANT CHANGE UP (ref 0.2–1.2)
BLOOD GAS VENOUS COMPREHENSIVE RESULT: SIGNIFICANT CHANGE UP
BLOOD GAS VENOUS COMPREHENSIVE RESULT: SIGNIFICANT CHANGE UP
BORDETELLA PARAPERTUSSIS (RAPRVP): SIGNIFICANT CHANGE UP
BUN SERPL-MCNC: 24 MG/DL — HIGH (ref 7–23)
C PNEUM DNA SPEC QL NAA+PROBE: SIGNIFICANT CHANGE UP
CALCIUM SERPL-MCNC: 9.1 MG/DL — SIGNIFICANT CHANGE UP (ref 8.4–10.5)
CHLORIDE BLDV-SCNC: 104 MMOL/L — SIGNIFICANT CHANGE UP (ref 96–108)
CHLORIDE BLDV-SCNC: 105 MMOL/L — SIGNIFICANT CHANGE UP (ref 96–108)
CHLORIDE SERPL-SCNC: 105 MMOL/L — SIGNIFICANT CHANGE UP (ref 98–107)
CO2 BLDV-SCNC: 24 MMOL/L — SIGNIFICANT CHANGE UP (ref 22–26)
CO2 BLDV-SCNC: 24.5 MMOL/L — SIGNIFICANT CHANGE UP (ref 22–26)
CO2 SERPL-SCNC: 22 MMOL/L — SIGNIFICANT CHANGE UP (ref 22–31)
CREAT SERPL-MCNC: 1.02 MG/DL — SIGNIFICANT CHANGE UP (ref 0.5–1.3)
EOSINOPHIL # BLD AUTO: 1.04 K/UL — HIGH (ref 0–0.5)
EOSINOPHIL NFR BLD AUTO: 11.9 % — HIGH (ref 0–6)
FLUAV SUBTYP SPEC NAA+PROBE: SIGNIFICANT CHANGE UP
FLUBV RNA SPEC QL NAA+PROBE: SIGNIFICANT CHANGE UP
GAS PNL BLDV: 133 MMOL/L — LOW (ref 136–145)
GAS PNL BLDV: 134 MMOL/L — LOW (ref 136–145)
GLUCOSE BLDV-MCNC: 150 MG/DL — HIGH (ref 70–99)
GLUCOSE BLDV-MCNC: 168 MG/DL — HIGH (ref 70–99)
GLUCOSE SERPL-MCNC: 123 MG/DL — HIGH (ref 70–99)
HADV DNA SPEC QL NAA+PROBE: SIGNIFICANT CHANGE UP
HCO3 BLDV-SCNC: 23 MMOL/L — SIGNIFICANT CHANGE UP (ref 22–29)
HCO3 BLDV-SCNC: 23 MMOL/L — SIGNIFICANT CHANGE UP (ref 22–29)
HCOV 229E RNA SPEC QL NAA+PROBE: SIGNIFICANT CHANGE UP
HCOV HKU1 RNA SPEC QL NAA+PROBE: SIGNIFICANT CHANGE UP
HCOV NL63 RNA SPEC QL NAA+PROBE: SIGNIFICANT CHANGE UP
HCOV OC43 RNA SPEC QL NAA+PROBE: SIGNIFICANT CHANGE UP
HCT VFR BLD CALC: 46.8 % — SIGNIFICANT CHANGE UP (ref 39–50)
HCT VFR BLDA CALC: 44 % — SIGNIFICANT CHANGE UP (ref 39–51)
HCT VFR BLDA CALC: 45 % — SIGNIFICANT CHANGE UP (ref 39–51)
HGB BLD CALC-MCNC: 14.8 G/DL — SIGNIFICANT CHANGE UP (ref 13–17)
HGB BLD CALC-MCNC: 14.9 G/DL — SIGNIFICANT CHANGE UP (ref 13–17)
HGB BLD-MCNC: 15.1 G/DL — SIGNIFICANT CHANGE UP (ref 13–17)
HMPV RNA SPEC QL NAA+PROBE: SIGNIFICANT CHANGE UP
HPIV1 RNA SPEC QL NAA+PROBE: SIGNIFICANT CHANGE UP
HPIV2 RNA SPEC QL NAA+PROBE: SIGNIFICANT CHANGE UP
HPIV3 RNA SPEC QL NAA+PROBE: SIGNIFICANT CHANGE UP
HPIV4 RNA SPEC QL NAA+PROBE: SIGNIFICANT CHANGE UP
IANC: 4.03 K/UL — SIGNIFICANT CHANGE UP (ref 1.5–8.5)
IMM GRANULOCYTES NFR BLD AUTO: 0.7 % — SIGNIFICANT CHANGE UP (ref 0–1.5)
LACTATE BLDV-MCNC: 1.9 MMOL/L — SIGNIFICANT CHANGE UP (ref 0.5–2)
LACTATE BLDV-MCNC: 3.4 MMOL/L — HIGH (ref 0.5–2)
LYMPHOCYTES # BLD AUTO: 2.63 K/UL — SIGNIFICANT CHANGE UP (ref 1–3.3)
LYMPHOCYTES # BLD AUTO: 30 % — SIGNIFICANT CHANGE UP (ref 13–44)
M PNEUMO DNA SPEC QL NAA+PROBE: SIGNIFICANT CHANGE UP
MAGNESIUM SERPL-MCNC: 2.3 MG/DL — SIGNIFICANT CHANGE UP (ref 1.6–2.6)
MCHC RBC-ENTMCNC: 29 PG — SIGNIFICANT CHANGE UP (ref 27–34)
MCHC RBC-ENTMCNC: 32.3 GM/DL — SIGNIFICANT CHANGE UP (ref 32–36)
MCV RBC AUTO: 90 FL — SIGNIFICANT CHANGE UP (ref 80–100)
MONOCYTES # BLD AUTO: 0.92 K/UL — HIGH (ref 0–0.9)
MONOCYTES NFR BLD AUTO: 10.5 % — SIGNIFICANT CHANGE UP (ref 2–14)
NEUTROPHILS # BLD AUTO: 4.03 K/UL — SIGNIFICANT CHANGE UP (ref 1.8–7.4)
NEUTROPHILS NFR BLD AUTO: 45.9 % — SIGNIFICANT CHANGE UP (ref 43–77)
NRBC # BLD: 0 /100 WBCS — SIGNIFICANT CHANGE UP
NRBC # FLD: 0 K/UL — SIGNIFICANT CHANGE UP
PCO2 BLDV: 43 MMHG — SIGNIFICANT CHANGE UP (ref 42–55)
PCO2 BLDV: 47 MMHG — SIGNIFICANT CHANGE UP (ref 42–55)
PH BLDV: 7.3 — LOW (ref 7.32–7.43)
PH BLDV: 7.33 — SIGNIFICANT CHANGE UP (ref 7.32–7.43)
PHOSPHATE SERPL-MCNC: 3.5 MG/DL — SIGNIFICANT CHANGE UP (ref 2.5–4.5)
PLATELET # BLD AUTO: 319 K/UL — SIGNIFICANT CHANGE UP (ref 150–400)
PO2 BLDV: 52 MMHG — SIGNIFICANT CHANGE UP
PO2 BLDV: 55 MMHG — SIGNIFICANT CHANGE UP
POTASSIUM BLDV-SCNC: 4.7 MMOL/L — SIGNIFICANT CHANGE UP (ref 3.5–5.1)
POTASSIUM BLDV-SCNC: 4.8 MMOL/L — SIGNIFICANT CHANGE UP (ref 3.5–5.1)
POTASSIUM SERPL-MCNC: 4.2 MMOL/L — SIGNIFICANT CHANGE UP (ref 3.5–5.3)
POTASSIUM SERPL-SCNC: 4.2 MMOL/L — SIGNIFICANT CHANGE UP (ref 3.5–5.3)
PROCALCITONIN SERPL-MCNC: 0.08 NG/ML — SIGNIFICANT CHANGE UP (ref 0.02–0.1)
PROT SERPL-MCNC: 7.5 G/DL — SIGNIFICANT CHANGE UP (ref 6–8.3)
RAPID RVP RESULT: SIGNIFICANT CHANGE UP
RBC # BLD: 5.2 M/UL — SIGNIFICANT CHANGE UP (ref 4.2–5.8)
RBC # FLD: 13.5 % — SIGNIFICANT CHANGE UP (ref 10.3–14.5)
RSV RNA SPEC QL NAA+PROBE: SIGNIFICANT CHANGE UP
RV+EV RNA SPEC QL NAA+PROBE: SIGNIFICANT CHANGE UP
SAO2 % BLDV: 84.9 % — SIGNIFICANT CHANGE UP
SAO2 % BLDV: 85.3 % — SIGNIFICANT CHANGE UP
SARS-COV-2 RNA SPEC QL NAA+PROBE: SIGNIFICANT CHANGE UP
SODIUM SERPL-SCNC: 138 MMOL/L — SIGNIFICANT CHANGE UP (ref 135–145)
T4 FREE SERPL-MCNC: 1 NG/DL — SIGNIFICANT CHANGE UP (ref 0.9–1.8)
TSH SERPL-MCNC: 2.55 UIU/ML — SIGNIFICANT CHANGE UP (ref 0.27–4.2)
WBC # BLD: 8.77 K/UL — SIGNIFICANT CHANGE UP (ref 3.8–10.5)
WBC # FLD AUTO: 8.77 K/UL — SIGNIFICANT CHANGE UP (ref 3.8–10.5)

## 2021-08-15 PROCEDURE — 71045 X-RAY EXAM CHEST 1 VIEW: CPT | Mod: 26

## 2021-08-15 PROCEDURE — 99223 1ST HOSP IP/OBS HIGH 75: CPT | Mod: GC

## 2021-08-15 RX ORDER — ALBUTEROL 90 UG/1
1 AEROSOL, METERED ORAL EVERY 4 HOURS
Refills: 0 | Status: DISCONTINUED | OUTPATIENT
Start: 2021-08-15 | End: 2021-08-16

## 2021-08-15 RX ORDER — ACETAMINOPHEN 500 MG
650 TABLET ORAL EVERY 6 HOURS
Refills: 0 | Status: DISCONTINUED | OUTPATIENT
Start: 2021-08-15 | End: 2021-08-16

## 2021-08-15 RX ORDER — THYROID 120 MG
1 TABLET ORAL
Qty: 0 | Refills: 0 | DISCHARGE

## 2021-08-15 RX ORDER — FAMOTIDINE 10 MG/ML
20 INJECTION INTRAVENOUS
Refills: 0 | Status: DISCONTINUED | OUTPATIENT
Start: 2021-08-15 | End: 2021-08-16

## 2021-08-15 RX ORDER — ASPIRIN/CALCIUM CARB/MAGNESIUM 324 MG
1 TABLET ORAL
Qty: 0 | Refills: 0 | DISCHARGE

## 2021-08-15 RX ORDER — DILTIAZEM HCL 120 MG
1 CAPSULE, EXT RELEASE 24 HR ORAL
Qty: 0 | Refills: 0 | DISCHARGE

## 2021-08-15 RX ORDER — IPRATROPIUM/ALBUTEROL SULFATE 18-103MCG
3 AEROSOL WITH ADAPTER (GRAM) INHALATION EVERY 4 HOURS
Refills: 0 | Status: DISCONTINUED | OUTPATIENT
Start: 2021-08-15 | End: 2021-08-16

## 2021-08-15 RX ORDER — DILTIAZEM HCL 120 MG
360 CAPSULE, EXT RELEASE 24 HR ORAL DAILY
Refills: 0 | Status: DISCONTINUED | OUTPATIENT
Start: 2021-08-15 | End: 2021-08-16

## 2021-08-15 RX ORDER — BUDESONIDE AND FORMOTEROL FUMARATE DIHYDRATE 160; 4.5 UG/1; UG/1
2 AEROSOL RESPIRATORY (INHALATION)
Refills: 0 | Status: DISCONTINUED | OUTPATIENT
Start: 2021-08-15 | End: 2021-08-16

## 2021-08-15 RX ORDER — TIOTROPIUM BROMIDE 18 UG/1
1 CAPSULE ORAL; RESPIRATORY (INHALATION) DAILY
Refills: 0 | Status: DISCONTINUED | OUTPATIENT
Start: 2021-08-15 | End: 2021-08-16

## 2021-08-15 RX ORDER — ALBUTEROL 90 UG/1
2.5 AEROSOL, METERED ORAL ONCE
Refills: 0 | Status: COMPLETED | OUTPATIENT
Start: 2021-08-15 | End: 2021-08-15

## 2021-08-15 RX ADMIN — ALBUTEROL 2.5 MILLIGRAM(S): 90 AEROSOL, METERED ORAL at 03:44

## 2021-08-15 RX ADMIN — FAMOTIDINE 20 MILLIGRAM(S): 10 INJECTION INTRAVENOUS at 17:23

## 2021-08-15 RX ADMIN — Medication 3 MILLILITER(S): at 20:42

## 2021-08-15 RX ADMIN — Medication 3 MILLILITER(S): at 17:23

## 2021-08-15 RX ADMIN — Medication 3 MILLILITER(S): at 01:36

## 2021-08-15 RX ADMIN — Medication 2 GRAM(S): at 00:45

## 2021-08-15 RX ADMIN — Medication 360 MILLIGRAM(S): at 11:37

## 2021-08-15 RX ADMIN — Medication 3 MILLILITER(S): at 12:50

## 2021-08-15 RX ADMIN — BUDESONIDE AND FORMOTEROL FUMARATE DIHYDRATE 2 PUFF(S): 160; 4.5 AEROSOL RESPIRATORY (INHALATION) at 20:43

## 2021-08-15 RX ADMIN — BUDESONIDE AND FORMOTEROL FUMARATE DIHYDRATE 2 PUFF(S): 160; 4.5 AEROSOL RESPIRATORY (INHALATION) at 09:06

## 2021-08-15 RX ADMIN — Medication 3 MILLILITER(S): at 09:06

## 2021-08-15 NOTE — H&P ADULT - NSHPLABSRESULTS_GEN_ALL_CORE
.  LABS:                         15.1   8.77  )-----------( 319      ( 15 Aug 2021 00:14 )             46.8     08-15    138  |  105  |  24<H>  ----------------------------<  123<H>  4.2   |  22  |  1.02    Ca    9.1      15 Aug 2021 00:14  Phos  3.5     08-15  Mg     2.30     08-15    TPro  7.5  /  Alb  4.4  /  TBili  <0.2  /  DBili  x   /  AST  13  /  ALT  17  /  AlkPhos  152<H>  08-15      MICROBIOLOGY  	8/15/2021 - RVP (-, including COVID-19)    IMAGING  	8/15/2021 - CXR: Pending Final Read LABS:                         15.1   8.77  )-----------( 319      ( 15 Aug 2021 00:14 )             46.8     08-15    138  |  105  |  24<H>  ----------------------------<  123<H>  4.2   |  22  |  1.02    Ca    9.1      15 Aug 2021 00:14  Phos  3.5     08-15  Mg     2.30     08-15    TPro  7.5  /  Alb  4.4  /  TBili  <0.2  /  DBili  x   /  AST  13  /  ALT  17  /  AlkPhos  152<H>  08-15      MICROBIOLOGY  	8/15/2021 - RVP (-, including COVID-19)    IMAGING  	8/15/2021 - CXR: Pending Final Read

## 2021-08-15 NOTE — H&P ADULT - NSHPSOCIALHISTORY_GEN_ALL_CORE
He lives with his wife in a house nearby [facility]. 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 He denies substance use, including tobacco, EtOH, THC, heroin, and cocaine.

## 2021-08-15 NOTE — H&P ADULT - PROBLEM SELECTOR PLAN 3
He has a known history of AFib (s/p cardioversion, ablation) with loop recorder. He says he takes Diltiazem 360 QD for this but hasn't had any issues in some time.   Dx: Sinus Tach on ECG  Rx: CONTINUE Home Diltiazem 360mg PO QD

## 2021-08-15 NOTE — H&P ADULT - PROBLEM SELECTOR PLAN 4
History of hypothyroidism on [place] 90 QD per pt's preference to avoid synthetic thyroid hormone. TSH, T4 assessed here WNL. We do not have armour thyroid on formulary or in hospital. He will have to have someone bring this medication in.  Rx:  - f/u History of hypothyroidism on [place] 90 QD per pt's preference to avoid synthetic thyroid hormone. TSH, T4 assessed here WNL. We do not have armour thyroid on formulary or in hospital. He will have to have someone bring this medication in.  Dx:  TSH & T4 both WNL (euthyroid)  Rx:  - f/u if pt can bring his own [place] thyroid, he can take it; unfortunately, this is not available here. Fortunately, the half-life in a euthyroid patient is 6-7d, so he should be covered for now regardless.

## 2021-08-15 NOTE — H&P ADULT - NSHPREVIEWOFSYSTEMS_GEN_ALL_CORE
GEN: No fever, chills, night sweats, weight loss  EYES: No vision changes, irritation, itchiness  ENT: No ear pain, congestion, sore throat  RESP: No cough or trouble breathing  CARDIOVASCULAR: No chest pain or palpitations  GI: No nausea/vomiting, diarrhea, constipation  :  No change in urine output; no dysuria, hematuria, or discharge  MSK: No joint or muscle pain  SKIN: No rashes  NEURO: No headache; no abnormal movements; no numbness or tingling  Remainder negative, except as per the HPI GEN: No fever, chills, night sweats, weight loss  EYES: No vision changes  ENT: No ear pain, sore throat; (+) sinus congestion (intermittent); (-) epistaxis (recurrent or otherwise; no h/o polyps)  RESP: as above  CARDIOVASCULAR: No chest pain; (+) palpitations as above  GI: No nausea/vomiting, diarrhea, constipation  :  No change in urine output; no dysuria, hematuria, or discharge  MSK: No joint or muscle pain  SKIN: No rashes  NEURO: No headache; no abnormal movements; no numbness or tingling  Remainder negative, except as per the HPI

## 2021-08-15 NOTE — H&P ADULT - PROBLEM SELECTOR PLAN 2
On admission, noted to have eosinophilia to 1k. This may be consistent with atopic association of asthma. DDx broad, including medication-associated (?ASA On admission, noted to have eosinophilia to 1k. This may be consistent with atopic association of asthma. DDx broad, including medication-associated (?ASA, Famotidine), eosinophilic asthma, EGPA (though no epistaxis, has ?restrictive lung disease with diffusion limitation per OP records). Ultimately, he is improved and the management for esoinophilia without obvious end organ dysfunction is to monitor in a few months to see if this is still presents.  Dx  - F/U CBC in am, then again as OP  - may need f/u CT Chest, pending pulmonology follow-up; would not pursue inpatient if otherwise stable    Rx: CTM

## 2021-08-15 NOTE — H&P ADULT - ASSESSMENT
Mr. Herrera is a 61M with h/o AFib (s/p cardioversion, ablation; Loop Recorder in place), Asthma (s/p multiple exacerbations historically but never requiring ICU/Intubation) who presents with acute asthma exacerbation with likely environmental trigger and who will be managed with nebulizers, steroids, and initiated on maintenance medications.

## 2021-08-15 NOTE — H&P ADULT - HISTORY OF PRESENT ILLNESS
Mr. Herrera is a 61M with h/o AFib ([id number]: 0; on Diltiazem; s/p cardioversion, ablation), Asthma (no h/o intubation, ICU admission, not on maintenance medications), hypothyroidism (on [place] thyroid per pt preference), JESSIKA (CPAP 4cm at home) who presents with increased cough, wheezing with concern for asthma exacerbation. He says that for the last month or so, he has been using his albuterol inhaler more frequently (as frequently as 12+ times per day). He thinks that this is related to an old, nikolai rug in his bedroom and seasonal changes. He says that he had previously been prescribed a maintenance inhaler but he did not continue it because he was feeling better. He says that he actually presented to the ED because he was feeling his heart racing after having used his inhaler so many times, which he knows is a reason to go to the hospital. He says this is consistent with his previous episodes of asthma; in fact, he says that this one isn't as bad as ones he has had in the past.     In the ED, he was noted to have diffuse, b/l wheeze audible without a stethoscope and managed for acute asthma exacerbation.  Meds: Methylprednisolone 40mg IV x1; Albuterol NEB x1; DuoNeb x1    In terms of his asthma history, he was once reportedly following with a Pulmonologist (Dr. Lydia Ashraf); however, during COVID-19, he stopped seeing that person. He intended to see someone else in their office; however, he did not. He says at one point he was using a maintenance inhaler; however, he stopped using it because he felt better. He believes that his triggers are primarily environmental (season changes, cold temperatures, nikolai rug, trees) and improved for a long time prior to now. Mr. Herrera is a 61M with h/o AFib ([id number]: 0; on Diltiazem; s/p cardioversion, ablation), Asthma (no h/o intubation, ICU admission, not on maintenance medications), hypothyroidism (on [place] thyroid per pt preference), JESSIKA (CPAP 4cm at home) who presents with increased cough, wheezing with concern for asthma exacerbation. He says that for the last month or so, he has been using his albuterol inhaler more frequently (as frequently as 12+ times per day). He thinks that this is related to an old, nikolai rug in his bedroom and seasonal changes. He says that he had previously been prescribed a maintenance inhaler but he did not continue it because he was feeling better. He says that he actually presented to the ED because he was feeling his heart racing after having used his inhaler so many times, which he knows is a reason to go to the hospital. He says this is consistent with his previous episodes of asthma; in fact, he says that this one isn't as bad as ones he has had in the past.     In the ED, he was noted to have diffuse, b/l wheeze audible without a stethoscope and managed for acute asthma exacerbation.  Meds: Methylprednisolone 40mg IV x1; Albuterol NEB x1; DuoNeb x1    In terms of his asthma history, he was once reportedly following with a Pulmonologist (Dr. Lydia Ashraf); however, during COVID-19, he stopped seeing that person. He intended to see someone else in their office; however, he did not. He says at one point he was using a maintenance inhaler; however, he stopped using it because he felt better. He believes that his triggers are primarily environmental (season changes, cold temperatures, nikolai rug, trees) and improved for a long time prior to now. Review of HIE Mr. Herrera is a 61M with h/o AFib ([id number]: 0; on Diltiazem; s/p cardioversion, ablation), Asthma (no h/o intubation, ICU admission, not on maintenance medications), hypothyroidism (on [place] thyroid per pt preference), JESSIKA (CPAP 4cm at home) who presents with increased cough, wheezing with concern for asthma exacerbation. He says that for the last month or so, he has been using his albuterol inhaler more frequently (as frequently as 12+ times per day). He thinks that this is related to an old, nikolai rug in his bedroom and seasonal changes. He says that he had previously been prescribed a maintenance inhaler but he did not continue it because he was feeling better. He says that he actually presented to the ED because he was feeling his heart racing after having used his inhaler so many times, which he knows is a reason to go to the hospital. He says this is consistent with his previous episodes of asthma; in fact, he says that this one isn't as bad as ones he has had in the past.     In the ED, he was noted to have diffuse, b/l wheeze audible without a stethoscope and managed for acute asthma exacerbation.  Meds: Methylprednisolone 40mg IV x1; Albuterol NEB x1; DuoNeb x1    In terms of his asthma history, he was once reportedly following with a Pulmonologist (Dr. Lydia Ashraf); however, during COVID-19, he stopped seeing that person. He intended to see someone else in their office; however, he did not. He says at one point he was using a maintenance inhaler; however, he stopped using it because he felt better. He believes that his triggers are primarily environmental (season changes, cold temperatures, nikolai rug, trees) and improved for a long time prior to now. Review of HIE is revealing for a pulmonology note that mentions "Previous PFT in 2017 showed moderate to severe restrictive and obstructive ventilatory impairment with mild reduction in diffusion with very minimal bronchodilator response"; it also refers to a CT Chest in 8/30/2018 that was not remarkable for pulmonary fribrotic disease at that time.

## 2021-08-15 NOTE — H&P ADULT - ATTENDING COMMENTS
61yr old male  with h/o AFib (s/p cardioversion, ablation; Loop Recorder in place), Asthma (s/p multiple exacerbations but never requiring ICU/Intubation ,presented with cough with minimal phlegm, , wheezing and palpitations,  admitted with acute asthma exacerbation  patient appears comfortable, no acute respiratory distress noted, pt with diffuse wheezing on ausculation   labs and imaging reviewed   will start on IV steroids, pt says he has responded well to IV steroids in the past ,transition to PO with clinical improvement , nebulizers, PRN cough medicine   Afib s/p ablation, not on AC, c/w diltiazem, pt had palpitations due ot over use of albuterol, now feeling better   Hypothyroidism, c/w home meds, TFTs wnl   DVT  and GI PPX  Pt must follow up with Pulmonary as outpt for management of asthma no acute distress noted repeat PFTs 61yr old male  with h/o AFib (s/p cardioversion, ablation; Loop Recorder in place), Asthma (s/p multiple exacerbations but never requiring ICU/Intubation ,presented with cough with minimal phlegm, , wheezing and palpitations,  admitted with acute asthma exacerbation  patient appears comfortable, no acute respiratory distress noted, pt with diffuse wheezing on ausculation   labs and imaging reviewed   will start on IV steroids, pt says he has responded well to IV steroids in the past ,transition to PO with clinical improvement , nebulizers, PRN cough medicine   O2 via NC as needed , monitor Peakflow   Afib s/p ablation, not on AC, c/w diltiazem, pt had palpitations due to  over use of albuterol, now feeling better   Hypothyroidism, c/w home meds, TFTs wnl   DVT  and GI PPX  Pt must follow up with Pulmonary as outpt for management of asthma no acute distress noted repeat PFTs

## 2021-08-15 NOTE — H&P ADULT - PROBLEM SELECTOR PLAN 1
History of asthma, not taking maintenance medications. Exam with wheeze b/l, trigger likely environmental. Improved by report but still wheeze on exam. DDx: environmental vs infectious trigger (likely former given timeline, negative w/u so far). VBG largely normal 7.30/47/55/23.   Diagnostics  [x] PCT 0.08 (WNL)  [x] RVP UR    [employer]rapeutics  [ ] CONTINUE DuoNebs NEB ATC  [ ] START Tiotropium 18ug PO QD  [ ] History of asthma, not taking maintenance medications. Exam with wheeze b/l, trigger likely environmental. Improved by report but still wheeze on exam. DDx: environmental vs infectious trigger (likely former given timeline, negative w/u so far). VBG largely normal 7.30/47/55/23.   Diagnostics  [x] PCT 0.08 (WNL)  [x] RVP UR    [employer]rapeutics  [ ] CONTINUE DuoNebs NEB ATC  [ ] CONTINUE Prednisone 40mg PO QD x5d (8/15-8/19)  [ ] START Tiotropium 18ug PO QD  [ ] START Symbicort QD History of asthma, not taking maintenance medications. Exam with wheeze b/l, trigger likely environmental. Improved by report but still wheeze on exam. DDx: environmental vs infectious trigger (likely former given timeline, negative w/u so far). VBG largely normal 7.30/47/55/23.   Diagnostics  [x] PCT 0.08 (WNL)  [x] RVP UR    [employer]  [ ] CONTINUE DuoNebs NEB ATC  [ ] CONTINUE Methylprednisolone 40mg IV QD (plan to transition to PO; complete 8/15-8/19 course)  [ ] START Tiotropium 18ug PO QD  [ ] START Symbicort QD

## 2021-08-15 NOTE — ED ADULT NURSE REASSESSMENT NOTE - NS ED NURSE REASSESS COMMENT FT1
Pt has no new complaints at this time. Respirations are equal and unlabored. Wheezing had subsided. Pt 96% o2 sat on 4L NC. Pt awaiting bed.
Report given to ESSEBONY RN.
Report received from noah RN. Patient A&Ox4, resting in stretcher, respirations even and unlabored, on cardiac monitor-sinus tachycardiac to 102.  Stretcher in lowest position, wheels locked, appropriate side rails in place, call bell in reach.

## 2021-08-15 NOTE — H&P ADULT - NSHPPHYSICALEXAM_GEN_ALL_CORE
GEN: Awake, AOx3, NAD.  HEENT: NCAT  ---EYES: no scleral icterus, EOMI, PERRLA  CARDIO: RRR. Normal S1/S2, no m/r/g. No JVD.  RESP: CTAB  ABD: Soft, NTND. BS+. No masses, no hepatosplenomegaly.  : No CVAT.   MSK: No obvious deformity or ROM deficit. 2+ pulses x4. No edema.  SKIN: Warm, dry. No rashes. Nail beds without cyanosis or clubbing.  NEURO: Moves all four extremities spontaneously  PSYCH: Appropriate mood & affect. No VH/AH. No SI/HI. GEN: Awake, AOx3, NAD.  HEENT: NCAT  ---EYES: no scleral icterus, EOMI  CARDIO: Tachycardic, heart sounds difficult to auscultate d/t body habitus but Normal S1/S2  RESP: Trace wheeze b/l, (upper > lower); normal WOB; nasal cannula in place appropriately, O2 sat 95-6% during this interview & exam  ABD: Soft, obese; no   : No CVAT.   MSK: No obvious deformity or ROM deficit. 2+ pulses x4. No edema.  SKIN: Warm, dry. No rashes. Nail beds without cyanosis or clubbing.  NEURO: Moves all four extremities spontaneously  PSYCH: Appropriate mood & affect. No VH/AH. No SI/HI. GEN: Awake, AOx3, NAD.  HEENT: NCAT  ---EYES: no scleral icterus, EOMI  CARDIO: Tachycardic, heart sounds difficult to auscultate d/t body habitus but Normal S1/S2  RESP: Trace wheeze b/l, (upper > lower); normal WOB; nasal cannula in place appropriately, O2 sat 95-6% during this interview & exam  ABD: Soft, obese; non-tender  : No CVAT.   MSK: No obvious deformity or ROM deficit. 2+ pulses x4. No edema.  SKIN: Warm, dry. No rashes. Nail beds without cyanosis or clubbing.  NEURO: Moves all four extremities spontaneously  PSYCH: Appropriate mood & affect.

## 2021-08-15 NOTE — H&P ADULT - PROBLEM SELECTOR PLAN 5
Hospital Bundle  Fluids: PO Ad mirtha  Electrolytes: Replete K > 4, Mg > 2, Phos > 3  Nutrition: Diet DASH/TLC  PPX  ---VTE: OOB as tolerated, SCD  ---GI: CONTINUE Home Famotidine 20mg PO QD  Access: PIV  Code Status: FULL CODE  Dispo: Likely to home

## 2021-08-16 ENCOUNTER — TRANSCRIPTION ENCOUNTER (OUTPATIENT)
Age: 61
End: 2021-08-16

## 2021-08-16 VITALS — OXYGEN SATURATION: 94 % | RESPIRATION RATE: 20 BRPM

## 2021-08-16 LAB
A1C WITH ESTIMATED AVERAGE GLUCOSE RESULT: 5.7 % — HIGH (ref 4–5.6)
ALBUMIN SERPL ELPH-MCNC: 3.8 G/DL — SIGNIFICANT CHANGE UP (ref 3.3–5)
ALP SERPL-CCNC: 116 U/L — SIGNIFICANT CHANGE UP (ref 40–120)
ALT FLD-CCNC: 13 U/L — SIGNIFICANT CHANGE UP (ref 4–41)
ANION GAP SERPL CALC-SCNC: 10 MMOL/L — SIGNIFICANT CHANGE UP (ref 7–14)
AST SERPL-CCNC: 10 U/L — SIGNIFICANT CHANGE UP (ref 4–40)
BASOPHILS # BLD AUTO: 0.04 K/UL — SIGNIFICANT CHANGE UP (ref 0–0.2)
BASOPHILS NFR BLD AUTO: 0.4 % — SIGNIFICANT CHANGE UP (ref 0–2)
BILIRUB SERPL-MCNC: <0.2 MG/DL — SIGNIFICANT CHANGE UP (ref 0.2–1.2)
BUN SERPL-MCNC: 23 MG/DL — SIGNIFICANT CHANGE UP (ref 7–23)
CALCIUM SERPL-MCNC: 8.7 MG/DL — SIGNIFICANT CHANGE UP (ref 8.4–10.5)
CHLORIDE SERPL-SCNC: 105 MMOL/L — SIGNIFICANT CHANGE UP (ref 98–107)
CO2 SERPL-SCNC: 20 MMOL/L — LOW (ref 22–31)
COVID-19 SPIKE DOMAIN AB INTERP: NEGATIVE — SIGNIFICANT CHANGE UP
COVID-19 SPIKE DOMAIN ANTIBODY RESULT: 0.4 U/ML — SIGNIFICANT CHANGE UP
CREAT SERPL-MCNC: 0.96 MG/DL — SIGNIFICANT CHANGE UP (ref 0.5–1.3)
EOSINOPHIL # BLD AUTO: 0.17 K/UL — SIGNIFICANT CHANGE UP (ref 0–0.5)
EOSINOPHIL NFR BLD AUTO: 1.6 % — SIGNIFICANT CHANGE UP (ref 0–6)
ESTIMATED AVERAGE GLUCOSE: 117 — SIGNIFICANT CHANGE UP
GLUCOSE SERPL-MCNC: 116 MG/DL — HIGH (ref 70–99)
HCT VFR BLD CALC: 43.1 % — SIGNIFICANT CHANGE UP (ref 39–50)
HGB BLD-MCNC: 14 G/DL — SIGNIFICANT CHANGE UP (ref 13–17)
IANC: 7.34 K/UL — SIGNIFICANT CHANGE UP (ref 1.5–8.5)
IMM GRANULOCYTES NFR BLD AUTO: 0.6 % — SIGNIFICANT CHANGE UP (ref 0–1.5)
LYMPHOCYTES # BLD AUTO: 1.88 K/UL — SIGNIFICANT CHANGE UP (ref 1–3.3)
LYMPHOCYTES # BLD AUTO: 18.2 % — SIGNIFICANT CHANGE UP (ref 13–44)
MAGNESIUM SERPL-MCNC: 2.2 MG/DL — SIGNIFICANT CHANGE UP (ref 1.6–2.6)
MCHC RBC-ENTMCNC: 29.2 PG — SIGNIFICANT CHANGE UP (ref 27–34)
MCHC RBC-ENTMCNC: 32.5 GM/DL — SIGNIFICANT CHANGE UP (ref 32–36)
MCV RBC AUTO: 90 FL — SIGNIFICANT CHANGE UP (ref 80–100)
MONOCYTES # BLD AUTO: 0.82 K/UL — SIGNIFICANT CHANGE UP (ref 0–0.9)
MONOCYTES NFR BLD AUTO: 8 % — SIGNIFICANT CHANGE UP (ref 2–14)
NEUTROPHILS # BLD AUTO: 7.34 K/UL — SIGNIFICANT CHANGE UP (ref 1.8–7.4)
NEUTROPHILS NFR BLD AUTO: 71.2 % — SIGNIFICANT CHANGE UP (ref 43–77)
NRBC # BLD: 0 /100 WBCS — SIGNIFICANT CHANGE UP
NRBC # FLD: 0 K/UL — SIGNIFICANT CHANGE UP
PHOSPHATE SERPL-MCNC: 2.8 MG/DL — SIGNIFICANT CHANGE UP (ref 2.5–4.5)
PLATELET # BLD AUTO: 273 K/UL — SIGNIFICANT CHANGE UP (ref 150–400)
POTASSIUM SERPL-MCNC: 4.4 MMOL/L — SIGNIFICANT CHANGE UP (ref 3.5–5.3)
POTASSIUM SERPL-SCNC: 4.4 MMOL/L — SIGNIFICANT CHANGE UP (ref 3.5–5.3)
PROT SERPL-MCNC: 6.5 G/DL — SIGNIFICANT CHANGE UP (ref 6–8.3)
RBC # BLD: 4.79 M/UL — SIGNIFICANT CHANGE UP (ref 4.2–5.8)
RBC # FLD: 13.7 % — SIGNIFICANT CHANGE UP (ref 10.3–14.5)
SARS-COV-2 IGG+IGM SERPL QL IA: 0.4 U/ML — SIGNIFICANT CHANGE UP
SARS-COV-2 IGG+IGM SERPL QL IA: NEGATIVE — SIGNIFICANT CHANGE UP
SODIUM SERPL-SCNC: 135 MMOL/L — SIGNIFICANT CHANGE UP (ref 135–145)
WBC # BLD: 10.31 K/UL — SIGNIFICANT CHANGE UP (ref 3.8–10.5)
WBC # FLD AUTO: 10.31 K/UL — SIGNIFICANT CHANGE UP (ref 3.8–10.5)

## 2021-08-16 PROCEDURE — 99238 HOSP IP/OBS DSCHRG MGMT 30/<: CPT | Mod: GC

## 2021-08-16 RX ORDER — ALBUTEROL 90 UG/1
2 AEROSOL, METERED ORAL EVERY 6 HOURS
Refills: 0 | Status: DISCONTINUED | OUTPATIENT
Start: 2021-08-16 | End: 2021-08-16

## 2021-08-16 RX ORDER — BUDESONIDE AND FORMOTEROL FUMARATE DIHYDRATE 160; 4.5 UG/1; UG/1
1 AEROSOL RESPIRATORY (INHALATION)
Qty: 30 | Refills: 0
Start: 2021-08-16 | End: 2021-09-14

## 2021-08-16 RX ORDER — ALBUTEROL 90 UG/1
2 AEROSOL, METERED ORAL
Qty: 0 | Refills: 0 | DISCHARGE

## 2021-08-16 RX ORDER — ALBUTEROL 90 UG/1
2 AEROSOL, METERED ORAL
Qty: 30 | Refills: 0
Start: 2021-08-16 | End: 2021-09-14

## 2021-08-16 RX ORDER — FAMOTIDINE 10 MG/ML
1 INJECTION INTRAVENOUS
Qty: 0 | Refills: 0 | DISCHARGE
Start: 2021-08-16

## 2021-08-16 RX ORDER — SODIUM,POTASSIUM PHOSPHATES 278-250MG
1 POWDER IN PACKET (EA) ORAL
Refills: 0 | Status: DISCONTINUED | OUTPATIENT
Start: 2021-08-16 | End: 2021-08-16

## 2021-08-16 RX ADMIN — BUDESONIDE AND FORMOTEROL FUMARATE DIHYDRATE 2 PUFF(S): 160; 4.5 AEROSOL RESPIRATORY (INHALATION) at 08:59

## 2021-08-16 RX ADMIN — Medication 360 MILLIGRAM(S): at 05:28

## 2021-08-16 RX ADMIN — ALBUTEROL 2 PUFF(S): 90 AEROSOL, METERED ORAL at 09:47

## 2021-08-16 RX ADMIN — FAMOTIDINE 20 MILLIGRAM(S): 10 INJECTION INTRAVENOUS at 05:28

## 2021-08-16 RX ADMIN — Medication 40 MILLIGRAM(S): at 05:28

## 2021-08-16 RX ADMIN — Medication 3 MILLILITER(S): at 00:58

## 2021-08-16 RX ADMIN — Medication 3 MILLILITER(S): at 05:28

## 2021-08-16 NOTE — PROGRESS NOTE ADULT - SUBJECTIVE AND OBJECTIVE BOX
Fred Fine M.D.  [facility] PGY-1    PROGRESS NOTE:     Patient is a 61y old  Male who presents with a chief complaint of Asthma Exacerbation (15 Aug 2021 06:54)      -OVERNIGHT EVENTS-      -SUBJECTIVE-      MEDICATIONS  (STANDING):  ALBUTerol    90 MICROgram(s) HFA Inhaler 1 Puff(s) Inhalation every 4 hours  albuterol/ipratropium for Nebulization 3 milliLiter(s) Nebulizer every 4 hours  budesonide 160 MICROgram(s)/formoterol 4.5 MICROgram(s) Inhaler 2 Puff(s) Inhalation two times a day  diltiazem    milliGRAM(s) Oral daily  famotidine    Tablet 20 milliGRAM(s) Oral two times a day  methylPREDNISolone sodium succinate Injectable 40 milliGRAM(s) IV Push daily  tiotropium 18 MICROgram(s) Capsule 1 Capsule(s) Inhalation daily    MEDICATIONS  (PRN):  acetaminophen   Tablet .. 650 milliGRAM(s) Oral every 6 hours PRN Temp greater or equal to 38.5C (101.3F), Mild Pain (1 - 3)      -OBJECTIVE-    CAPILLARY BLOOD GLUCOSE        I&O's Summary      VITAL SIGNS  Vital Signs Last 24 Hrs  T(C): 36.7 (16 Aug 2021 05:20), Max: 37.1 (15 Aug 2021 07:47)  T(F): 98 (16 Aug 2021 05:20), Max: 98.8 (15 Aug 2021 07:47)  HR: 92 (16 Aug 2021 05:20) (92 - 110)  BP: 121/62 (16 Aug 2021 05:20) (121/62 - 145/78)  BP(mean): --  RR: 20 (16 Aug 2021 05:20) (18 - 24)  SpO2: 99% (16 Aug 2021 05:20) (96% - 100%)    PHYSICAL EXAM:  GENERAL/CONSTITUTIONAL: NAD, Awake, AOx3 (person, place, time)  HEENT: NCAT  ---EYES: no scleral icterus, EOMI, PERRLA  ---ENMT: MMM,   ---NECK: No palpable masses; no thyromegaly  CARDIO: RRR. Normal S1/S2, no m/r/g.  RESP: Normal respiratory effort; CTAB, no w/r/r  ABD: Soft, NTND; +BS.   : No CVAT.   MSK: No obvious deformity or ROM deficit.  ---EXTREMITIES: No peripheral edema. Peripheral pulses 2+ x4.  SKIN: Warm, dry. No rashes.   NEURO: Moves all four extremities spontaneously  PSYCH: Appropriate mood & affect. No VH/AH. No SI/HI.    LABS:                        14.0   10.31 )-----------( 273      ( 16 Aug 2021 06:43 )             43.1     08-15    138  |  105  |  24<H>  ----------------------------<  123<H>  4.2   |  22  |  1.02    Ca    9.1      15 Aug 2021 00:14  Phos  3.5     08-15  Mg     2.30     08-15    TPro  7.5  /  Alb  4.4  /  TBili  <0.2  /  DBili  x   /  AST  13  /  ALT  17  /  AlkPhos  152<H>  08-15    MICROBIOLOGY  No interval micro data    IMAGING  No interval imaging   Fred Fine M.D.  [facility] PGY-1    PROGRESS NOTE:     Patient is a 61y old  Male who presents with a chief complaint of Asthma Exacerbation (15 Aug 2021 06:54)      -OVERNIGHT EVENTS-  NAEON    -SUBJECTIVE-  Mr. Herrera says that he is feeling better this morning without trouble breathing. He's curious about continuing IV steroids as that has worked well for him in the past. He denies ongoing CP/Palpitations from the nebulized medicines.    MEDICATIONS  (STANDING):  ALBUTerol    90 MICROgram(s) HFA Inhaler 1 Puff(s) Inhalation every 4 hours  albuterol/ipratropium for Nebulization 3 milliLiter(s) Nebulizer every 4 hours  budesonide 160 MICROgram(s)/formoterol 4.5 MICROgram(s) Inhaler 2 Puff(s) Inhalation two times a day  diltiazem    milliGRAM(s) Oral daily  famotidine    Tablet 20 milliGRAM(s) Oral two times a day  methylPREDNISolone sodium succinate Injectable 40 milliGRAM(s) IV Push daily  tiotropium 18 MICROgram(s) Capsule 1 Capsule(s) Inhalation daily    MEDICATIONS  (PRN):  acetaminophen   Tablet .. 650 milliGRAM(s) Oral every 6 hours PRN Temp greater or equal to 38.5C (101.3F), Mild Pain (1 - 3)      -OBJECTIVE-    CAPILLARY BLOOD GLUCOSE        I&O's Summary      VITAL SIGNS  Vital Signs Last 24 Hrs  T(C): 36.7 (16 Aug 2021 05:20), Max: 37.1 (15 Aug 2021 07:47)  T(F): 98 (16 Aug 2021 05:20), Max: 98.8 (15 Aug 2021 07:47)  HR: 92 (16 Aug 2021 05:20) (92 - 110)  BP: 121/62 (16 Aug 2021 05:20) (121/62 - 145/78)  BP(mean): --  RR: 20 (16 Aug 2021 05:20) (18 - 24)  SpO2: 99% (16 Aug 2021 05:20) (96% - 100%)    PHYSICAL EXAM:  GENERAL/CONSTITUTIONAL: NAD, Awake, AOx3 (person, place, time)  HEENT: NCAT  ---EYES: no scleral icterus, EOMI  CARDIO: mildly tachycardic (low 100s)  RESP: Normal respiratory effort; CTAB, no w/r/r; 97% on 2L NC  ABD: Soft, NTND; +BS.   : No CVAT.   MSK: No obvious deformity or ROM deficit.  ---EXTREMITIES: No peripheral edema.  SKIN: Warm, dry. No rashes.   NEURO: Moves all four extremities spontaneously  PSYCH: Appropriate mood & affect.     LABS:                        14.0   10.31 )-----------( 273      ( 16 Aug 2021 06:43 )             43.1     08-15    138  |  105  |  24<H>  ----------------------------<  123<H>  4.2   |  22  |  1.02    Ca    9.1      15 Aug 2021 00:14  Phos  3.5     08-15  Mg     2.30     08-15    TPro  7.5  /  Alb  4.4  /  TBili  <0.2  /  DBili  x   /  AST  13  /  ALT  17  /  AlkPhos  152<H>  08-15    MICROBIOLOGY  No interval micro data    IMAGING  No interval imaging   Fred Fine M.D.  [facility] PGY-1    PROGRESS NOTE:     Patient is a 61y old  Male who presents with a chief complaint of Asthma Exacerbation (15 Aug 2021 06:54)      -OVERNIGHT EVENTS-  NAEON    -SUBJECTIVE-  Mr. Herrera says that he is feeling better this morning without trouble breathing. He's curious about continuing IV steroids as that has worked well for him in the past. He denies ongoing CP/Palpitations from the nebulized medicines.    MEDICATIONS  (STANDING):  ALBUTerol    90 MICROgram(s) HFA Inhaler 1 Puff(s) Inhalation every 4 hours  albuterol/ipratropium for Nebulization 3 milliLiter(s) Nebulizer every 4 hours  budesonide 160 MICROgram(s)/formoterol 4.5 MICROgram(s) Inhaler 2 Puff(s) Inhalation two times a day  diltiazem    milliGRAM(s) Oral daily  famotidine    Tablet 20 milliGRAM(s) Oral two times a day  methylPREDNISolone sodium succinate Injectable 40 milliGRAM(s) IV Push daily  tiotropium 18 MICROgram(s) Capsule 1 Capsule(s) Inhalation daily    MEDICATIONS  (PRN):  acetaminophen   Tablet .. 650 milliGRAM(s) Oral every 6 hours PRN Temp greater or equal to 38.5C (101.3F), Mild Pain (1 - 3)      -OBJECTIVE-    CAPILLARY BLOOD GLUCOSE        I&O's Summary      VITAL SIGNS  Vital Signs Last 24 Hrs  T(C): 36.7 (16 Aug 2021 05:20), Max: 37.1 (15 Aug 2021 07:47)  T(F): 98 (16 Aug 2021 05:20), Max: 98.8 (15 Aug 2021 07:47)  HR: 92 (16 Aug 2021 05:20) (92 - 110)  BP: 121/62 (16 Aug 2021 05:20) (121/62 - 145/78)  BP(mean): --  RR: 20 (16 Aug 2021 05:20) (18 - 24)  SpO2: 99% (16 Aug 2021 05:20) (96% - 100%)    PHYSICAL EXAM:  GENERAL/CONSTITUTIONAL: NAD, Awake, AOx3 (person, place, time)  HEENT: NCAT  ---EYES: no scleral icterus, EOMI  CARDIO: mildly tachycardic (low 100s)  RESP: Normal respiratory effort; CTAB, no wheeze; 97% on 2L NC  ABD: Soft, NT; obese abdomen  : No CVAT.   MSK: No obvious deformity or ROM deficit.  ---EXTREMITIES: No peripheral edema.  SKIN: Warm, dry. No rashes.   NEURO: Moves all four extremities spontaneously  PSYCH: Appropriate mood & affect.     LABS:                        14.0   10.31 )-----------( 273      ( 16 Aug 2021 06:43 )             43.1     08-15    138  |  105  |  24<H>  ----------------------------<  123<H>  4.2   |  22  |  1.02    Ca    9.1      15 Aug 2021 00:14  Phos  3.5     08-15  Mg     2.30     08-15    TPro  7.5  /  Alb  4.4  /  TBili  <0.2  /  DBili  x   /  AST  13  /  ALT  17  /  AlkPhos  152<H>  08-15    MICROBIOLOGY  No interval micro data    IMAGING  No interval imaging

## 2021-08-16 NOTE — PROGRESS NOTE ADULT - ASSESSMENT
Mr. Herrera is a 61M with h/o AFib (s/p cardioversion, ablation; Loop Recorder in place), Asthma (s/p multiple exacerbations historically but never requiring ICU/Intubation) who presents with acute asthma exacerbation with likely environmental trigger and who will be managed with nebulizers, steroids, and initiated on maintenance medications. Mr. Herrera is a 61M with h/o AFib (s/p cardioversion, ablation; Loop Recorder in place), Asthma (s/p multiple exacerbations historically but never requiring ICU/Intubation) who presents with acute asthma exacerbation with likely environmental trigger and who is improving after initiation of albuterol, steroids, and maintenance inhalers.

## 2021-08-16 NOTE — PROGRESS NOTE ADULT - PROBLEM SELECTOR PLAN 1
History of asthma, not taking maintenance medications. Exam with wheeze b/l, trigger likely environmental. Improved by report but still wheeze on exam. DDx: environmental vs infectious trigger (likely former given timeline, negative w/u so far). VBG largely normal 7.30/47/55/23.   Diagnostics  [x] PCT 0.08 (WNL)  [x] RVP UR    Therapeutics  [ ] CONTINUE DuoNebs NEB ATC  [ ] DIScontinue Methylprednisolone 40mg IV QD  [ ] START Prednisone 40mg PO QD (complete 8/15-8/19 course)  [ ] CONTINUE Tiotropium 18ug PO QD  [ ] CONTINUE Symbicort QD History of asthma, not taking maintenance medications. Exam with wheeze b/l, trigger likely environmental. Improved by report but still wheeze on exam. DDx: environmental vs infectious trigger (likely former given timeline, negative w/u so far). VBG largely normal 7.30/47/55/23.   Diagnostics  [x] PCT 0.08 (WNL)  [x] RVP UR    Therapeutics  [ ] DIScontinue DuoNebs NEB ATC  [ ] DIScontinue Methylprednisolone 40mg IV QD  [ ] START Albuterol MDI Q6H SHADIA; START Ipratropium MDI Q8H SHADIA  [ ] START Prednisone 40mg PO QD (complete 8/15-8/19 course)  [ ] CONTINUE Tiotropium 18ug PO QD  [ ] CONTINUE Symbicort QD History of asthma, not taking maintenance medications. Exam with wheeze b/l, trigger likely environmental. Improved by report but still wheeze on exam. DDx: environmental vs infectious trigger (likely former given timeline, negative w/u so far). VBG largely normal 7.30/47/55/23.   Diagnostics  [x] PCT 0.08 (WNL)  [x] RVP UR    Therapeutics  [ ] DIScontinue DuoNebs NEB ATC  [ ] DIScontinue Methylprednisolone 40mg IV QD (8/15-8/16)  [ ] DIScontinue Tiotropium 18ug PO QD  [ ] START Albuterol MDI Q6H SHADIA  [ ] START Prednisone 50mg PO QD (complete 8/17-8/19 steroid course)  [ ] CONTINUE Symbicort QD

## 2021-08-16 NOTE — DISCHARGE NOTE PROVIDER - NSDCMRMEDTOKEN_GEN_ALL_CORE_FT
[place] Thyroid 90 mg oral tablet: 1 tab(s) orally once a day  budesonide-formoterol 160 mcg-4.5 mcg/inh inhalation aerosol: 1 application inhaled once a day   DilTIAZem (Eqv-Cardizem CD) 360 mg/24 hours oral capsule, extended release: 1 cap(s) orally once a day  famotidine 20 mg oral tablet: 1 tab(s) orally 2 times a day  predniSONE 50 mg oral tablet: 1 tab(s) orally once a day  Ventolin HFA 90 mcg/inh inhalation aerosol: 2 puff(s) inhaled 4 times a day, As Needed

## 2021-08-16 NOTE — DISCHARGE NOTE PROVIDER - NSDCCPCAREPLAN_GEN_ALL_CORE_FT
PRINCIPAL DISCHARGE DIAGNOSIS  Diagnosis: Acute asthma exacerbation  Assessment and Plan of Treatment: You were admitted with a diagnosis of "Acute Asthma Exacerbation", which we think is related to the dust in your home. You were given steroids and nebulized albuterol/ipratropium ("DuoNebs") with good results and improvement in your breathing, oxygen saturation, and wheezing. Ultimately, you should be taking a maintenance medication like Symbicort every day to prevent these exacerbations even if you feel well; we sent a prescription for this medicine to your pharmacy. We also sent a prescription for 3 more days of steroid by mouth that you should take as well.  You will need to follow-up with a pulmonologist to ensure that your respiratory conditions, including this and the other conditions that you have been diagnosed with are managed effectively outside the hospital.      SECONDARY DISCHARGE DIAGNOSES  Diagnosis: Hypothyroidism  Assessment and Plan of Treatment: You have a diagnosis of hypothyroidism. Your thyroid was checked here and was normal (TSH: 2.55; Free T4: 1.0). You should continue taking your thyroid medication when you return home and follow-up with whichever doctor/provider that manages this condition with you.    Diagnosis: AF (atrial fibrillation)  Assessment and Plan of Treatment: You have a diagnosis of atrial fibrillation. We did not identify any AFib on this admission. You were continued on your home medication; you should follow-up with the providers that manage this problem with you as appropriate.

## 2021-08-16 NOTE — PROGRESS NOTE ADULT - PROBLEM SELECTOR PLAN 4
History of hypothyroidism on [place] 90 QD per pt's preference to avoid synthetic thyroid hormone. TSH, T4 assessed here WNL. We do not have armour thyroid on formulary or in hospital. He will have to have someone bring this medication in.  Dx:  TSH & T4 both WNL (euthyroid)  Rx:  - f/u if pt can bring his own [place] thyroid, he can take it; unfortunately, this is not available here. Fortunately, the half-life in a euthyroid patient is 6-7d, so he should be covered for now regardless.

## 2021-08-16 NOTE — PROGRESS NOTE ADULT - PROBLEM SELECTOR PLAN 2
On admission, noted to have eosinophilia to 1k. This may be consistent with atopic association of asthma. DDx broad, including medication-associated (?ASA, Famotidine), eosinophilic asthma, EGPA (though no epistaxis, has ?restrictive lung disease with diffusion limitation per OP records). Ultimately, he is improved and the management for esoinophilia without obvious end organ dysfunction is to monitor in a few months to see if this is still presents.  Dx  - F/U CBC in am, then again as OP  - may need f/u CT Chest, pending pulmonology follow-up; would not pursue inpatient if otherwise stable    Rx: CTM Present on admission, resolved quickly with management of asthma exacerbation as above. No end organ dysfunction.  Dx  - F/U CBC in am, then again as OP  - may need f/u CT Chest, pending pulmonology follow-up; would not pursue inpatient if otherwise stable    Rx: CTM

## 2021-08-16 NOTE — DISCHARGE NOTE PROVIDER - NSFOLLOWUPCLINICS_GEN_ALL_ED_FT
[facility] Pulmonolgy and Sleep Medicine  Pulmonology  [address] [place] [address], [place] [address]  Phone: [phone]  Fax:

## 2021-08-16 NOTE — PROGRESS NOTE ADULT - ATTENDING COMMENTS
60 y/o M with PMH afib, asthma, who presents for wheezing and SOB likely 2/2 asthma exacerbation.     #Asthma exacerbation, pt with wheezing on exam initially requiring O2 (likely for tachypnea/wheezing - hypoxia never recorded). Now improving. Unclear etiology for exacerbation. RVP negative. Possibly environmental. On my exam today, no wheezing heard. Walk test without desaturations. S/p IV steroids and nebulizers. Transition to PO steroids to complete a 5 day course. C/w home symbicort. Encouraged to follow up with pulmonologist. Medically ready for discharge to home.     DC time:25 mins

## 2021-08-16 NOTE — DISCHARGE NOTE NURSING/CASE MANAGEMENT/SOCIAL WORK - PATIENT PORTAL LINK FT
You can access the FollowMyHealth Patient Portal offered by [facility] by registering at the following website: [email or web address]. By joining [facility]’s FollowMyHealth portal, you will also be able to view your health information using other applications (apps) compatible with our system.

## 2021-08-16 NOTE — DISCHARGE NOTE PROVIDER - HOSPITAL COURSE
Mr. Herrera is a 61M with documented history of Asthma (though PFTs reportedly more restrictive than obstructive), JESSIKA, OHS, who presented with tachycardia and palpitations in the setting of increased albuterol inhaler use at home with audible wheezing heard without stethoscope and concern for asthma exacerbation. Given his history, this is most likely an exacerbation of his condition as a consequence of environmental triggers (specifically a nikolai rug and tree pollen) that slowly developed over the course of a month. He received IV steroids (Methylpred 40 x2), DuoNebs, and Tiotropium with good response. He was de-escalated to PO steroids and spaced MDI inhalers prior to discharge. He was initiated on Symbicort MDI as well in this context. Ultimately, he will need pulmonology follow-up to optimize his regimen and consider further workup for his respiratory conditions.

## 2022-09-24 NOTE — H&P ADULT - NEGATIVE OPHTHALMOLOGIC SYMPTOMS
no blurred vision R/no pain L/no blurred vision L/no diplopia/no photophobia/no pain R/no loss of vision L/no loss of vision R
COUGH

## 2022-12-01 NOTE — ED PROVIDER NOTE - WET READ LAUNCH FT
There is 1 Wet Read(s) to document. There are no Wet Read(s) to document. Alert and interactive, no focal deficits

## 2022-12-05 NOTE — H&P ADULT - NSHPREVIEWOFSYSTEMS_GEN_ALL_CORE
see other phone note.   Constitutional: No Fever, Fatigue, Weight Changes  Eyes: No recent vision problems or eye pain.  ENT: No congestion, ear pain, or sore throat.  Endocrine: No excess sweating, temperature intolerance  Cardiovascular: SOB. No chest pain, palpitations, pre-syncope, syncope  Respiratory: No cough, congestion, or wheezing.  Gastrointestinal: No abdominal pain, nausea, vomiting, or diarrhea.  Genitourinary: No dysuria, hematuria  Musculoskeletal: No joint swelling, joint pain  Neurologic: No headache, dizziness, focal weakness  Skin: No rashes, hematoma, prupura Constitutional: No Fever, Fatigue, Weight Changes  Eyes: No recent vision problems or eye pain.  ENT: Tongue swelling.   Endocrine: No excess sweating, temperature intolerance  Cardiovascular: SOB. No chest pain, palpitations, pre-syncope, syncope  Respiratory: No cough, congestion, or wheezing.  Gastrointestinal: No abdominal pain, nausea, vomiting, or diarrhea.  Genitourinary: No dysuria, hematuria  Musculoskeletal: No joint swelling, joint pain  Neurologic: No headache, dizziness, focal weakness  Skin: No rashes, hematoma, prupura Constitutional: No Fever, Fatigue, Weight Changes  Eyes: No recent vision problems or eye pain.  ENT: +Tongue swelling, throat closing  Cardiovascular: No chest pain, palpitations, pre-syncope, syncope  Respiratory: +SOB, no cough, congestion, or wheezing.  Gastrointestinal: No abdominal pain, nausea, vomiting, or diarrhea.  Genitourinary: No dysuria, hematuria  Musculoskeletal: No joint swelling, joint pain  Neurologic: No headache, dizziness, focal weakness  Skin: +hives, pruritus

## 2023-01-11 ENCOUNTER — APPOINTMENT (OUTPATIENT)
Dept: GASTROENTEROLOGY | Facility: CLINIC | Age: 63
End: 2023-01-11

## 2023-03-27 ENCOUNTER — APPOINTMENT (OUTPATIENT)
Dept: GASTROENTEROLOGY | Facility: CLINIC | Age: 63
End: 2023-03-27
Payer: COMMERCIAL

## 2023-03-27 VITALS
TEMPERATURE: 97.9 F | HEIGHT: 76 IN | DIASTOLIC BLOOD PRESSURE: 72 MMHG | RESPIRATION RATE: 16 BRPM | SYSTOLIC BLOOD PRESSURE: 120 MMHG | BODY MASS INDEX: 34.1 KG/M2 | WEIGHT: 280 LBS | HEART RATE: 86 BPM | OXYGEN SATURATION: 98 %

## 2023-03-27 DIAGNOSIS — Z86.69 PERSONAL HISTORY OF OTHER DISEASES OF THE NERVOUS SYSTEM AND SENSE ORGANS: ICD-10-CM

## 2023-03-27 DIAGNOSIS — Z86.79 PERSONAL HISTORY OF OTHER DISEASES OF THE CIRCULATORY SYSTEM: ICD-10-CM

## 2023-03-27 DIAGNOSIS — Z87.438 PERSONAL HISTORY OF OTHER DISEASES OF MALE GENITAL ORGANS: ICD-10-CM

## 2023-03-27 DIAGNOSIS — Z12.11 ENCOUNTER FOR SCREENING FOR MALIGNANT NEOPLASM OF COLON: ICD-10-CM

## 2023-03-27 DIAGNOSIS — Z86.39 PERSONAL HISTORY OF OTHER ENDOCRINE, NUTRITIONAL AND METABOLIC DISEASE: ICD-10-CM

## 2023-03-27 PROCEDURE — 99204 OFFICE O/P NEW MOD 45 MIN: CPT

## 2023-03-28 PROBLEM — Z86.79 HISTORY OF ESSENTIAL HYPERTENSION: Status: RESOLVED | Noted: 2023-03-28 | Resolved: 2023-03-28

## 2023-03-28 PROBLEM — Z86.79 HISTORY OF ATRIAL FIBRILLATION: Status: RESOLVED | Noted: 2023-03-28 | Resolved: 2023-03-28

## 2023-03-28 PROBLEM — Z12.11 COLON CANCER SCREENING: Status: ACTIVE | Noted: 2023-03-28

## 2023-03-28 PROBLEM — Z87.438 HISTORY OF BENIGN PROSTATIC HYPERPLASIA: Status: RESOLVED | Noted: 2023-03-28 | Resolved: 2023-03-28

## 2023-03-28 PROBLEM — Z86.39 HISTORY OF HYPOTHYROIDISM: Status: RESOLVED | Noted: 2023-03-28 | Resolved: 2023-03-28

## 2023-03-28 PROBLEM — Z86.69 HISTORY OF SLEEP APNEA: Status: RESOLVED | Noted: 2023-03-28 | Resolved: 2023-03-28

## 2023-03-28 RX ORDER — TAMSULOSIN HYDROCHLORIDE 0.4 MG/1
0.4 CAPSULE ORAL
Refills: 0 | Status: ACTIVE | COMMUNITY

## 2023-03-28 RX ORDER — LEVOTHYROXINE SODIUM 0.09 MG/1
88 TABLET ORAL
Refills: 0 | Status: ACTIVE | COMMUNITY

## 2023-03-28 RX ORDER — SODIUM SULFATE, POTASSIUM SULFATE AND MAGNESIUM SULFATE 1.6; 3.13; 17.5 G/177ML; G/177ML; G/177ML
17.5-3.13-1.6 SOLUTION ORAL TWICE DAILY
Qty: 2 | Refills: 0 | Status: ACTIVE | COMMUNITY
Start: 2023-03-28 | End: 1900-01-01

## 2023-03-28 RX ORDER — DILTIAZEM HYDROCHLORIDE 240 MG/1
240 CAPSULE, COATED, EXTENDED RELEASE ORAL
Refills: 0 | Status: ACTIVE | COMMUNITY

## 2023-03-28 RX ORDER — FLUTICASONE FUROATE AND VILANTEROL TRIFENATATE 100; 25 UG/1; UG/1
100-25 POWDER RESPIRATORY (INHALATION)
Refills: 0 | Status: ACTIVE | COMMUNITY

## 2023-03-28 NOTE — CONSULT LETTER
[FreeTextEntry3] : Don\par \par Hunter Clemens MD\par \par Gastroenterology\par [facility]\par [facility]\par \par

## 2023-03-28 NOTE — HISTORY OF PRESENT ILLNESS
[FreeTextEntry1] : He is a 62 year old asymptomatic male referred for a screening colonoscopy. He has never had one before. He denies  a family history of colon cancer.

## 2023-03-28 NOTE — ASSESSMENT
[FreeTextEntry1] : MAGALYS RUTLEDGE was advised to undergo colonoscopy to which he agreed. The procedure will be performed in [facility] \[facility] with the assistance of an anesthesiologist. The patient was given a Suprep preparation prescription and understood the \par procedure as it was explained to his. He was given a booklet distributed by the [employer]\par  Endoscopy explaining the procedure in detail and he understood the risks of the procedure not limited to infection, bleeding,\par perforation or non- diagnosis of colorectal cancer. He was advised that he could not drive home, if he chooses to\par  receive sedation.\par \par Further diagnostic and treatment recommendations will be based upon the procedure and any biopsies, if they are taken.\par \par Thank you for allowing me to participate in this [facility] health care.\par \par , Best personal regards -- Don\par

## 2023-04-06 NOTE — PATIENT PROFILE ADULT. - SPIRITUAL CULTURAL, RELIGIOUS PRACTICES/VALUES, PROFILE
----- Message from Julia Biggs MD sent at 4/4/2023  1:03 PM CDT -----  Please advise Parent of normal Lipid panel.     Component      Latest Ref Rng & Units 4/3/2023   Fasting Status          CHOLESTEROL      <=169 mg/dL 157   TRIGLYCERIDE      <=74 mg/dL 75 (H)   HDL      >=46 mg/dL 60   CALCULATED LDL      <=109 mg/dL 82   CALCULATED NON HDL      <=119 mg/dL 97   CHOL/HDL      <=4.4 2.6       Call to mom. Relayed information above from PCP. No further questions at this time.    Encounter closed.     [facility]

## 2023-04-21 NOTE — PATIENT PROFILE ADULT - DOMESTIC TRAVEL HIGH RISK QUESTION
Patient Name: Stephen Aguero  : 1942    MRN: [id number]                              Today's Date: 2023       Admit Date: 2023    Visit Dx:     ICD-10-CM ICD-9-CM   1. Acute respiratory failure, unspecified whether with hypoxia or hypercapnia  J96.00 518.81   2. Decreased activities of daily living (ADL)  Z78.9 V49.89   3. Difficulty in walking  R26.2 719.7     Patient Active Problem List   Diagnosis   • Uncontrolled diabetes mellitus with hyperglycemia (HCC)   • Elevated cholesterol   • High blood pressure   • Sleep apnea   • Screening for colon cancer   • Elevated PSA   • Benign prostatic hyperplasia with urinary frequency   • Generalized weakness   • Pedal edema   • Chronic venous stasis dermatitis   • Hypoxia   • Microscopic hematuria   • Diabetes mellitus   • COPD (chronic obstructive pulmonary disease)   • Obesity hypoventilation syndrome   • Atrial fibrillation   • Chronic respiratory failure with hypercapnia   • Acute respiratory failure with hypoxia   • Altered mental status   • Pleural effusion   • Cirrhosis of liver     Past Medical History:   Diagnosis Date   • BPH (benign prostatic hyperplasia)    • Chronic a-fib    • Chronic pain    • Diabetes    • Diabetes mellitus    • Elevated cholesterol    • Elevated PSA    • GERD (gastroesophageal reflux disease)    • High blood pressure    • Hypertension    • Sleep apnea      Past Surgical History:   Procedure Laterality Date   • ABDOMINAL SURGERY     • APPENDECTOMY     • BACK SURGERY     • COLONOSCOPY      [facility]   • EYE FOREIGN BODY REMOVAL     • EYE SURGERY     • FACIAL COSMETIC SURGERY     • LAPAROSCOPIC CHOLECYSTECTOMY     • LEG SURGERY     • NECK SURGERY        General Information     Row Name 23 1108          OT Time and Intention    Document Type therapy note (daily note)  -AV     Mode of Treatment individual therapy;occupational therapy  -AV     Row Name 23 1108          General Information    Existing  Precautions/Restrictions fall;oxygen therapy device and L/min  -AV     Barriers to Rehab cognitive status  -AV     Row Name 04/21/23 1108          Cognition    Orientation Status (Cognition) --  Alert and agreeable to upper extremity exercises.  -AV     Row Name 04/21/23 1108          Safety Issues, Functional Mobility    Impairments Affecting Function (Mobility) balance;endurance/activity tolerance;pain;strength;motor control  -AV           User Key  (r) = Recorded By, (t) = Taken By, (c) = Cosigned By    Initials Name Provider Type    Emigdio Gonzales OT Occupational Therapist                 Mobility/ADL's    No documentation.                Obj/Interventions     Row Name 04/21/23 1108          Shoulder (Therapeutic Exercise)    Shoulder AAROM (Therapeutic Exercise) bilateral;flexion;aBduction;aDduction;10 repetitions  -AV     [facility] Name 04/21/23 1108          Elbow/Forearm (Therapeutic Exercise)    Elbow/Forearm AAROM (Therapeutic Exercise) bilateral;flexion;extension;10 repetitions  -AV     Row Name 04/21/23 1108          Wrist (Therapeutic Exercise)    Wrist (Therapeutic Exercise) AAROM (active assistive range of motion)  -AV     Wrist AAROM (Therapeutic Exercise) bilateral;flexion;extension;10 repetitions  -AV     Row Name 04/21/23 1108          Motor Skills    Therapeutic Exercise shoulder;elbow/forearm;wrist  Performed in high Fowlers.  -AV           User Key  (r) = Recorded By, (t) = Taken By, (c) = Cosigned By    Initials Name Provider Type    Emigdio Gonzales OT Occupational Therapist               Goals/Plan    No documentation.                Clinical Impression     [facility] Name 04/21/23 1109          Pain Assessment    Additional Documentation Pain Scale: FACES Pre/Post-Treatment (Group)  -AV     Row Name 04/21/23 1109          Pain Scale: FACES Pre/Post-Treatment    Pain: FACES Scale, Pretreatment 0-->no hurt  -AV     Posttreatment Pain Rating 0-->no hurt  -AV     [facility] Name 04/21/23 1109          Plan  of Care Review    Progress no change  -AV     Outcome Evaluation Patient performed upper extremity active assistive range of motion exercises with fair participation.  Continued OT is indicated to remediate/compensate for deficits to maximize independence.  -AV     Row Name 04/21/23 1109          Therapy Plan Review/Discharge Plan (OT)    Anticipated Discharge Disposition (OT) inpatient rehabilitation facility;sub acute care setting  -AV           User Key  (r) = Recorded By, (t) = Taken By, (c) = Cosigned By    Initials Name Provider Type    AV Emigdio Alamo OT Occupational Therapist               Outcome Measures     Row Name 04/21/23 1110          How much help from another is currently needed...    Putting on and taking off regular lower body clothing? 1  -AV     Bathing (including washing, rinsing, and drying) 1  -AV     Toileting (which includes using toilet bed pan or urinal) 1  -AV     Putting on and taking off regular upper body clothing 1  -AV     Taking care of personal grooming (such as brushing teeth) 1  -AV     Eating meals 2  -AV     AM-PAC 6 Clicks Score (OT) 7  -AV     Row Name 04/21/23 0745          How much help from another person do you currently need...    Turning from your back to your side while in flat bed without using bedrails? 2  -HL     Moving from lying on back to sitting on the side of a flat bed without bedrails? 1  -HL     Moving to and from a bed to a chair (including a wheelchair)? 1  -HL     Standing up from a chair using your arms (e.g., wheelchair, bedside chair)? 1  -HL     Climbing 3-5 steps with a railing? 1  -HL     To walk in hospital room? 1  -HL     AM-PAC 6 Clicks Score (PT) 7  -HL     Highest level of mobility 2 --> Bed activities/dependent transfer  -HL     Row Name 04/21/23 1110          Optimal Instrument    Bending/Stooping 5  -AV     Standing 5  -AV     Reaching 3  -AV           User Key  (r) = Recorded By, (t) = Taken By, (c) = Cosigned By    Initials Name  Provider Type    HL Nadia Ward, RN Registered Nurse    Emigdio Gonzales OT Occupational Therapist                Occupational Therapy Education     Title: PT OT SLP Therapies (In Progress)     Topic: Occupational Therapy (In Progress)     Point: ADL training (Done)     Description:   Instruct learner(s) on proper safety adaptation and remediation techniques during self care or transfers.   Instruct in proper use of assistive devices.              Learning Progress Summary           Patient Acceptance, E, VU by KATT at 4/17/2023 4429                   Point: Home exercise program (Not Started)     Description:   Instruct learner(s) on appropriate technique for monitoring, assisting and/or progressing therapeutic exercises/activities.              Learner Progress:  Not documented in this visit.          Point: Precautions (Not Started)     Description:   Instruct learner(s) on prescribed precautions during self-care and functional transfers.              Learner Progress:  Not documented in this visit.          Point: Body mechanics (Not Started)     Description:   Instruct learner(s) on proper positioning and spine alignment during self-care, functional mobility activities and/or exercises.              Learner Progress:  Not documented in this visit.                      User Key     Initials Effective Dates Name Provider Type Discipline     06/16/21 -  Emigdio Alamo OT Occupational Therapist OT              OT Recommendation and Plan  Planned Therapy Interventions (OT): activity tolerance training, BADL retraining, functional balance retraining, occupation/activity based interventions, patient/caregiver education/training, transfer/mobility retraining, strengthening exercise  Therapy Frequency (OT): 5 times/wk  Plan of Care Review  Plan of Care Reviewed With: patient  Progress: no change  Outcome Evaluation: Patient performed upper extremity active assistive range of motion exercises with fair participation.   Continued OT is indicated to remediate/compensate for deficits to maximize independence.     Time Calculation:    Time Calculation- OT     Row Name 04/21/23 1110             Time Calculation- OT    OT Received On 04/21/23  -AV      OT Goal Re-Cert Due Date 04/26/23  -AV         Timed Charges    23730 - OT Therapeutic Exercise Minutes 10  -AV         Total Minutes    Timed Charges Total Minutes 10  -AV       Total Minutes 10  -AV            User Key  (r) = Recorded By, (t) = Taken By, (c) = Cosigned By    Initials Name Provider Type    AV Emigdio Alamo OT Occupational Therapist              Therapy Charges for Today     Code Description Service Date Service Provider Modifiers Qty    [id number] HC OT THER PROC EA 15 MIN 4/21/2023 Emigdio Alamo OT GO 1               Emigdio Alamo OT  4/21/2023   Unable to Assess

## 2023-06-06 ENCOUNTER — APPOINTMENT (OUTPATIENT)
Dept: GASTROENTEROLOGY | Facility: AMBULATORY SURGERY CENTER | Age: 63
End: 2023-06-06

## 2023-08-13 NOTE — PATIENT PROFILE ADULT. - ABILITY TO HEAR (WITH HEARING AID OR HEARING APPLIANCE IF NORMALLY USED):
Adequate: hears normal conversation without difficulty
Patient/Caregiver provided printed discharge information.

## 2025-06-23 NOTE — ED PROVIDER NOTE - PHYSICAL EXAMINATION
Medication: warfarin  Medication refill denied. Refills are on file at pharmacy.    Last OV Plan of care: Patient continues on Coumadin and is followed by the Coumadin clinic. We did not draw any labs today due to not having anyone in the office to draw labs.   Last Refill:  3/4/2025  Number of Refills Sent:  3  Quantity of Medication Given:  150 tablets        General: appears in distress, morbid obese body habitus  HEENT: lip swelling tongue swelling; no stridor, airway patent, full ROM of neck, + urticaria on face and neck but no cellulitic findings   Skin: diffuse urticaria   CVS: tachycardia, S1S2, no M/R/G  Pulm: CTAB  GI: + BS, soft, non-tender, non-distended   Neuro: WNL, non-focal exam   MSK: WNL

## 2025-07-09 NOTE — ED PROVIDER NOTE - NS ED ATTENDING STATEMENT MOD
I have personally seen and examined this patient.  I have fully participated in the care of this patient. I have reviewed all pertinent clinical information, including history, physical exam, plan and the Resident’s note and agree except as noted. Statement Selected

## 2025-08-19 ENCOUNTER — EMERGENCY (EMERGENCY)
Facility: HOSPITAL | Age: 65
LOS: 1 days | End: 2025-08-19
Attending: STUDENT IN AN ORGANIZED HEALTH CARE EDUCATION/TRAINING PROGRAM | Admitting: STUDENT IN AN ORGANIZED HEALTH CARE EDUCATION/TRAINING PROGRAM
Payer: COMMERCIAL

## 2025-08-19 VITALS
OXYGEN SATURATION: 95 % | SYSTOLIC BLOOD PRESSURE: 125 MMHG | RESPIRATION RATE: 18 BRPM | HEART RATE: 86 BPM | DIASTOLIC BLOOD PRESSURE: 64 MMHG | TEMPERATURE: 98 F

## 2025-08-19 VITALS
HEART RATE: 82 BPM | DIASTOLIC BLOOD PRESSURE: 81 MMHG | OXYGEN SATURATION: 98 % | HEIGHT: 75 IN | RESPIRATION RATE: 18 BRPM | WEIGHT: 315 LBS | SYSTOLIC BLOOD PRESSURE: 133 MMHG | TEMPERATURE: 98 F

## 2025-08-19 DIAGNOSIS — Z98.890 OTHER SPECIFIED POSTPROCEDURAL STATES: Chronic | ICD-10-CM

## 2025-08-19 PROCEDURE — 99284 EMERGENCY DEPT VISIT MOD MDM: CPT

## 2025-08-19 PROCEDURE — 72100 X-RAY EXAM L-S SPINE 2/3 VWS: CPT | Mod: 26

## 2025-08-19 PROCEDURE — 72131 CT LUMBAR SPINE W/O DYE: CPT | Mod: 26

## 2025-08-19 PROCEDURE — 72128 CT CHEST SPINE W/O DYE: CPT | Mod: 26

## 2025-08-19 RX ORDER — METHOCARBAMOL 500 MG/1
2 TABLET, FILM COATED ORAL
Qty: 30 | Refills: 0
Start: 2025-08-19 | End: 2025-08-23

## 2025-08-19 RX ORDER — METHOCARBAMOL 500 MG/1
1 TABLET, FILM COATED ORAL
Qty: 21 | Refills: 0
Start: 2025-08-19 | End: 2025-08-25

## 2025-08-19 RX ORDER — LIDOCAINE HYDROCHLORIDE 20 MG/ML
1 JELLY TOPICAL
Qty: 1 | Refills: 1
Start: 2025-08-19 | End: 2025-08-28

## 2025-08-19 RX ORDER — ACETAMINOPHEN 500 MG/5ML
650 LIQUID (ML) ORAL ONCE
Refills: 0 | Status: COMPLETED | OUTPATIENT
Start: 2025-08-19 | End: 2025-08-19

## 2025-08-19 RX ORDER — IBUPROFEN 200 MG
1 TABLET ORAL
Qty: 20 | Refills: 0
Start: 2025-08-19 | End: 2025-08-23

## 2025-08-19 RX ORDER — METHOCARBAMOL 500 MG/1
1000 TABLET, FILM COATED ORAL ONCE
Refills: 0 | Status: COMPLETED | OUTPATIENT
Start: 2025-08-19 | End: 2025-08-19

## 2025-08-19 RX ORDER — IBUPROFEN 200 MG
600 TABLET ORAL ONCE
Refills: 0 | Status: COMPLETED | OUTPATIENT
Start: 2025-08-19 | End: 2025-08-19

## 2025-08-19 RX ADMIN — Medication 600 MILLIGRAM(S): at 22:51

## 2025-08-19 RX ADMIN — METHOCARBAMOL 1000 MILLIGRAM(S): 500 TABLET, FILM COATED ORAL at 16:07

## 2025-08-19 RX ADMIN — Medication 650 MILLIGRAM(S): at 22:50

## 2025-08-26 ENCOUNTER — NON-APPOINTMENT (OUTPATIENT)
Age: 65
End: 2025-08-26

## 2025-08-28 ENCOUNTER — APPOINTMENT (OUTPATIENT)
Dept: ORTHOPEDIC SURGERY | Facility: CLINIC | Age: 65
End: 2025-08-28
Payer: COMMERCIAL

## 2025-08-28 VITALS
SYSTOLIC BLOOD PRESSURE: 135 MMHG | BODY MASS INDEX: 39.17 KG/M2 | DIASTOLIC BLOOD PRESSURE: 73 MMHG | WEIGHT: 315 LBS | HEIGHT: 75 IN | HEART RATE: 81 BPM

## 2025-08-28 DIAGNOSIS — S32.009A UNSPECIFIED FRACTURE OF UNSPECIFIED LUMBAR VERTEBRA, INITIAL ENCOUNTER FOR CLOSED FRACTURE: ICD-10-CM

## 2025-08-28 PROCEDURE — 99204 OFFICE O/P NEW MOD 45 MIN: CPT

## 2025-08-28 RX ORDER — TRAMADOL HYDROCHLORIDE 50 MG/1
50 TABLET, COATED ORAL
Qty: 42 | Refills: 0 | Status: ACTIVE | COMMUNITY
Start: 2025-08-28 | End: 1900-01-01

## 2025-09-15 ENCOUNTER — RX RENEWAL (OUTPATIENT)
Age: 65
End: 2025-09-15